# Patient Record
Sex: MALE | Employment: FULL TIME | URBAN - METROPOLITAN AREA
[De-identification: names, ages, dates, MRNs, and addresses within clinical notes are randomized per-mention and may not be internally consistent; named-entity substitution may affect disease eponyms.]

---

## 2021-06-21 ENCOUNTER — IMMUNIZATION (OUTPATIENT)
Dept: LAB | Facility: HOSPITAL | Age: 56
End: 2021-06-21
Attending: EMERGENCY MEDICINE
Payer: COMMERCIAL

## 2021-06-21 DIAGNOSIS — Z23 NEED FOR VACCINATION: Primary | ICD-10-CM

## 2021-06-21 PROCEDURE — 0001A SARSCOV2 VAC 30MCG/0.3ML IM: CPT

## 2021-07-12 ENCOUNTER — IMMUNIZATION (OUTPATIENT)
Dept: LAB | Facility: HOSPITAL | Age: 56
End: 2021-07-12
Attending: EMERGENCY MEDICINE
Payer: COMMERCIAL

## 2021-07-12 DIAGNOSIS — Z23 NEED FOR VACCINATION: Primary | ICD-10-CM

## 2021-07-12 PROCEDURE — 0002A SARSCOV2 VAC 30MCG/0.3ML IM: CPT

## 2024-07-05 ENCOUNTER — HOSPITAL ENCOUNTER (OUTPATIENT)
Age: 59
Discharge: HOME | End: 2024-07-05
Payer: COMMERCIAL

## 2024-07-05 VITALS
OXYGEN SATURATION: 96 % | DIASTOLIC BLOOD PRESSURE: 90 MMHG | RESPIRATION RATE: 20 BRPM | SYSTOLIC BLOOD PRESSURE: 139 MMHG | TEMPERATURE: 98 F | HEART RATE: 90 BPM

## 2024-07-05 DIAGNOSIS — H65.03 NON-RECURRENT ACUTE SEROUS OTITIS MEDIA OF BOTH EARS: Primary | ICD-10-CM

## 2024-07-05 DIAGNOSIS — H61.23 BILATERAL IMPACTED CERUMEN: ICD-10-CM

## 2024-07-05 RX ORDER — CETIRIZINE HYDROCHLORIDE 10 MG/1
10 TABLET ORAL DAILY
Qty: 30 TABLET | Refills: 0 | Status: SHIPPED | OUTPATIENT
Start: 2024-07-05 | End: 2024-08-04

## 2024-07-05 RX ORDER — AMOXICILLIN 875 MG/1
875 TABLET, COATED ORAL 2 TIMES DAILY
Qty: 20 TABLET | Refills: 0 | Status: SHIPPED | OUTPATIENT
Start: 2024-07-05 | End: 2024-07-15

## 2024-07-05 NOTE — ED PROVIDER NOTES
Patient Seen in: Immediate Care [facility]      History     Chief Complaint   Patient presents with    Cough/URI     Stated Complaint: Sinus Issue    Subjective:   59-year-old male presents to immediate care for bilateral ear discomfort.  Patient states he has had sinus cough and cold symptoms over the last 2-1/2 to 3 weeks.  He states over the last several days he has had bilateral ear pain and pressure.  Denies any fevers            Objective:   Past Medical History:    Hematuria              Past Surgical History:   Procedure Laterality Date    Arthroscopy of joint unlisted      left knee scope    Colonoscopy N/A 1/29/2016    Procedure: COLONOSCOPY;  Surgeon: Belem Tobias MD;  Location:  ENDOSCOPY                Social History     Socioeconomic History    Marital status: Single   Tobacco Use    Smoking status: Never   Substance and Sexual Activity    Alcohol use: Yes     Alcohol/week: 0.0 standard drinks of alcohol     Comment: 1-2 glasses weekly    Drug use: No              Review of Systems   Constitutional: Negative.    HENT:          Ear discomfort   Respiratory: Negative.     Cardiovascular: Negative.    Gastrointestinal: Negative.    Skin: Negative.    Neurological: Negative.        Positive for stated Chief Complaint: Cough/URI    Other systems are as noted in HPI.  Constitutional and vital signs reviewed.      All other systems reviewed and negative except as noted above.    Physical Exam     ED Triage Vitals [07/05/24 0941]   /90   Pulse 90   Resp 20   Temp 98.4 °F (36.9 °C)   Temp src Temporal   SpO2 96 %   O2 Device None (Room air)       Current Vitals:   Vital Signs  BP: 139/90  Pulse: 90  Resp: 20  Temp: 98.4 °F (36.9 °C)  Temp src: Temporal    Oxygen Therapy  SpO2: 96 %  O2 Device: None (Room air)            Physical Exam  Vitals and nursing note reviewed.   Constitutional:       General: He is not in acute distress.  HENT:      Head: Normocephalic.      Right Ear: There is  impacted cerumen.      Left Ear: There is impacted cerumen.   Cardiovascular:      Rate and Rhythm: Normal rate.   Pulmonary:      Effort: Pulmonary effort is normal.   Musculoskeletal:         General: Normal range of motion.   Skin:     General: Skin is warm and dry.   Neurological:      General: No focal deficit present.      Mental Status: He is alert and oriented to person, place, and time.               ED Course   Labs Reviewed - No data to display  Cerumen Impaction procedure note:  Removal of cerumen    Consent: Verbal consent obtained.  Risks and benefits: risks, benefits and alternatives were discussed includes hearing loss, tympanic membrane rupture, and/or inability to remove cerumen.  Patient verbalizes understanding and consents to procedures verbally.  Ear: Bilateral  Procedure details: MA used warm water irrigation and a curette to remove cerumen from external auditory canal.  Otoscope was used for visualization throughout the procedure.   Large amount of cerumen was removed  Post-procedure exam: Bilateral TMs are cloudy, bulging and erythematous.  Patient tolerated: Well  Complications: None                 MDM      Medical Decision Making  Pertinent Labs & Imaging studies reviewed. (See chart for details).  Patient coming in with bilateral ear discomfort, cold symptoms.   Differential diagnosis includes viral URI, cerumen impaction, otitis media  Will treat for cerumen impaction, otitis media.  Will discharge on amoxicillin. Patient is comfortable with this plan.     Overall Pt looks good. Non-toxic, well-hydrated and in no respiratory distress. Vital signs are reassuring. Exam is reassuring. I do not believe pt requires and additional diagnostic studies or intervention. I believe pt can be discharged home to continue evaluation as an outpatient. Follow-up provider given. Discharge instructions given and reviewed. Return for any problems. All understand and agreewith the plan.     Please note that  this report has been produced using speech recognition software and may contain errors related to that system including, but not limited to, errors in grammar, punctuation, and spelling, as well as words and phrases that possibly may have been recognized inappropriately. If there are any questions or concerns, contact the dictating provider for clarification.       The 21st Century Cures Act makes Medical Notes like these available to patients in the interest of transparency.  However, be advised this is a medical document.  It is intended as peer to peer communication.  It is written in medical language and may contain abbreviations or verbiage that are unfamiliar.  It may appear blunt or direct.  Medical documents are intended to carry relevant information, facts as evident, and the clinical opinion of the practitioner      Problems Addressed:  Bilateral impacted cerumen: acute illness or injury  Non-recurrent acute serous otitis media of both ears: acute illness or injury    Risk  OTC drugs.  Prescription drug management.        Disposition and Plan     Clinical Impression:  1. Non-recurrent acute serous otitis media of both ears    2. Bilateral impacted cerumen         Disposition:  Discharge  7/5/2024 10:31 am    Follow-up:  Christiano Bailey DO  [facility] [address] [place] [id number]  [phone]                Medications Prescribed:  Discharge Medication List as of 7/5/2024 10:34 AM        START taking these medications    Details   amoxicillin 875 MG Oral Tab Take 1 tablet (875 mg total) by mouth 2 (two) times daily for 10 days., Normal, Disp-20 tablet, R-0      cetirizine 10 MG Oral Tab Take 1 tablet (10 mg total) by mouth daily., Normal, Disp-30 tablet, R-0

## 2024-07-05 NOTE — DISCHARGE INSTRUCTIONS
Take antibiotics as prescribed  Go directly to emergency department for severe ear pain, sudden inability to hearing, swelling to your face behind ears or in your neck  You may also take ibuprofen or Tylenol for pain  Avoid swimming or submerging your head in water until you finish antibiotics  Avoid use of Q-tips

## 2024-07-11 ENCOUNTER — OFFICE VISIT (OUTPATIENT)
Dept: FAMILY MEDICINE CLINIC | Facility: CLINIC | Age: 59
End: 2024-07-11
Payer: COMMERCIAL

## 2024-07-11 ENCOUNTER — HOSPITAL ENCOUNTER (OUTPATIENT)
Dept: GENERAL RADIOLOGY | Age: 59
Discharge: HOME | End: 2024-07-11
Attending: FAMILY MEDICINE
Payer: COMMERCIAL

## 2024-07-11 ENCOUNTER — TELEPHONE (OUTPATIENT)
Dept: FAMILY MEDICINE CLINIC | Facility: CLINIC | Age: 59
End: 2024-07-11

## 2024-07-11 VITALS
SYSTOLIC BLOOD PRESSURE: 110 MMHG | RESPIRATION RATE: 16 BRPM | TEMPERATURE: 98 F | DIASTOLIC BLOOD PRESSURE: 80 MMHG | WEIGHT: 169 LBS | HEIGHT: 71 IN | HEART RATE: 77 BPM | BODY MASS INDEX: 23.66 KG/M2

## 2024-07-11 DIAGNOSIS — M54.12 CERVICAL RADICULOPATHY: Primary | ICD-10-CM

## 2024-07-11 DIAGNOSIS — J32.0 CHRONIC MAXILLARY SINUSITIS: ICD-10-CM

## 2024-07-11 DIAGNOSIS — H61.92 LESION OF LEFT EXTERNAL EAR: ICD-10-CM

## 2024-07-11 DIAGNOSIS — M54.12 CERVICAL RADICULOPATHY: ICD-10-CM

## 2024-07-11 PROCEDURE — 3074F SYST BP LT 130 MM HG: CPT | Performed by: FAMILY MEDICINE

## 2024-07-11 PROCEDURE — 99203 OFFICE O/P NEW LOW 30 MIN: CPT | Performed by: FAMILY MEDICINE

## 2024-07-11 PROCEDURE — 72050 X-RAY EXAM NECK SPINE 4/5VWS: CPT | Performed by: FAMILY MEDICINE

## 2024-07-11 PROCEDURE — 3008F BODY MASS INDEX DOCD: CPT | Performed by: FAMILY MEDICINE

## 2024-07-11 PROCEDURE — 3079F DIAST BP 80-89 MM HG: CPT | Performed by: FAMILY MEDICINE

## 2024-07-11 RX ORDER — CETIRIZINE HYDROCHLORIDE 10 MG/1
10 TABLET ORAL DAILY
Qty: 90 TABLET | Refills: 0 | Status: SHIPPED | OUTPATIENT
Start: 2024-07-11

## 2024-07-11 RX ORDER — FLUTICASONE PROPIONATE 50 MCG
2 SPRAY, SUSPENSION (ML) NASAL DAILY
Qty: 3 EACH | Refills: 0 | Status: SHIPPED | OUTPATIENT
Start: 2024-07-11

## 2024-07-11 NOTE — PROGRESS NOTES
[facility] Family [facility] Office Note  Chief Complaint:   Chief Complaint   Patient presents with    Wound Care     Wound on left ear     Arthritis     Patient states having pain and pressure on the lower neck area with tingling nerves going down his face arms and legs        HPI:   This is a 59 year old male coming in for:    Neck pain - The patient has complaints of bilateral neck pain. Pain started several years ago. Inciting event was unknown. Pt does have pain radiating into the arm. Pt reports tingling in the arm/hand. Pt denies weakness in the arm. Pain is worsened by bending his neck. Pain is lessened by nothing.  Sinusitis -patient believes he may have a history of seasonal allergies.  He was seen recently at walk-in clinic and prescribed amoxicillin for possible ear infection and given Zyrtec for allergies.  He has been taking both amoxicillin and Zyrtec but has not seen much improvement in his symptoms after 6 days.  States he has chronic sinus congestion near the maxillary region.  He also has occasional postnasal drip as well as rhinitis.  Lesion of left ear -patient noticed a lesion on the helix of the left external ear about 6 months ago.  States it has become more ulcerated and erosive.  He has not had it evaluated yet.  He has no prior history of skin cancer nor is it in the family.      Past Medical History:    Hematuria     Past Surgical History:   Procedure Laterality Date    Arthroscopy of joint unlisted      left knee scope    Colonoscopy N/A 1/29/2016    Procedure: COLONOSCOPY;  Surgeon: Belem Tobias MD;  Location:  ENDOSCOPY     Social History:  Social History     Socioeconomic History    Marital status: Single   Tobacco Use    Smoking status: Never   Substance and Sexual Activity    Alcohol use: Yes     Alcohol/week: 0.0 standard drinks of alcohol     Comment: 1-2 glasses weekly    Drug use: No     Family History:  No family history on file.  Allergies:  No Known  Allergies  Current Meds:  Current Outpatient Medications   Medication Sig Dispense Refill    fluticasone propionate 50 MCG/ACT Nasal Suspension 2 sprays by Each Nare route daily. 3 each 0    cetirizine 10 MG Oral Tab Take 1 tablet (10 mg total) by mouth daily. 90 tablet 0    amoxicillin 875 MG Oral Tab Take 1 tablet (875 mg total) by mouth 2 (two) times daily for 10 days. 20 tablet 0    cetirizine 10 MG Oral Tab Take 1 tablet (10 mg total) by mouth daily. 30 tablet 0    Multiple Vitamin (MULTI VITAMIN MENS) Oral Tab Take 1 tablet by mouth daily.        Counseling given: Not Answered       REVIEW OF SYSTEMS:   ROS:  CONSTITUTIONAL:  Denies any unusual weight gain/loss, fever, chills, weakness or fatigue.  HEENT:  Eyes:  Denies visual loss, blurred vision, double vision or yellow sclerae. Ears, Nose, Throat:  Denies hearing loss, sneezing, congestion, runny nose or sore throat.  CARDIOVASCULAR:  Denies chest pain, chest pressure or chest discomfort. No palpitations or edema.  Denies any dyspnea on exertion or at rest  RESPIRATORY:  Denies shortness of breath, cough  GASTROINTESTINAL:  Denies any abdominal pain, nausea, vomiting  NEUROLOGICAL:  Denies headache, dizziness, syncope, numbness or tingling in the extremities.  MUSCULOSKELETAL:  + neck pain  ALLERGIES:  + rhinitis    EXAM:   /80   Pulse 77   Temp 97.5 °F (36.4 °C) (Temporal)   Resp 16   Ht 5' 11\" (1.803 m)   Wt 169 lb (76.7 kg)   BMI 23.57 kg/m²  Estimated body mass index is 23.57 kg/m² as calculated from the following:    Height as of this encounter: 5' 11\" (1.803 m).    Weight as of this encounter: 169 lb (76.7 kg).   Vital signs reviewed.Appears stated age, well groomed.  Physical Exam:  GEN:  Patient is alert and oriented x3, no apparent distress  HEAD:  Normocephalic, atraumatic  HEENT:  Eyes: EOMI, PERRLA, no scleral icterus, conjunctivae clear bilaterally.  Ears: TM's clear and visible bilaterally, no excess cerumen or erythema, +  ulceration of left helix of external ear.  Throat:  No tonsillar erythema or exudate.  Mouth:  No oral lesions or ulcerations, no dental abnormalities noted.  LUNGS: clear to auscultation bilaterally, no rales/rhonchi/wheezing  HEART:  Regular rate and rhythm, no murmurs, rubs or gallops  ABDOMEN:  Soft, nondistended, nontender, bowel sounds normal in all 4 quadrants, no hepatosplenomegaly  EXTREMITIES:  Strength intact with 5/5 bilaterally upper and lower extremities, no edema noted  NEURO:  CN 2 - 12 grossly intact     ASSESSMENT AND PLAN:   1. Cervical radiculopathy  -  check C-spine xray  -  pending xray results, will refer to pain mgmt or neurosurgery  - XR CERVICAL SPINE (4VIEWS) (CPT=72050); Future    2. Chronic maxillary sinusitis  -  trial of flonase at bedtime and zyrtec once daily in am  -  refer to ENT if no improvement  - ENT Referral - In Network  - fluticasone propionate 50 MCG/ACT Nasal Suspension; 2 sprays by Each Nare route daily.  Dispense: 3 each; Refill: 0  - cetirizine 10 MG Oral Tab; Take 1 tablet (10 mg total) by mouth daily.  Dispense: 90 tablet; Refill: 0    3. Lesion of left external ear  -  concern for squamous cell carcinoma  -  refer to dermatology for evaluation  - Derm Referral - In Network      Meds & Refills for this Visit:  Requested Prescriptions     Signed Prescriptions Disp Refills    fluticasone propionate 50 MCG/ACT Nasal Suspension 3 each 0     Si sprays by Each Nare route daily.    cetirizine 10 MG Oral Tab 90 tablet 0     Sig: Take 1 tablet (10 mg total) by mouth daily.       Health Maintenance:  Health Maintenance Due   Topic Date Due    Annual Physical  Never done    DTaP,Tdap,and Td Vaccines (1 - Tdap) Never done    Zoster Vaccines (1 of 2) Never done    PSA  2017    COVID-19 Vaccine (3 - -24 season) 2023    Annual Depression Screening  Never done       Patient/Caregiver Education: Patient/Caregiver Education: There are no barriers to learning.  Medical education done.   Outcome: Patient verbalizes understanding. Patient is notified to call with any questions, complications, allergies, or worsening or changing symptoms.  Patient is to call with any side effects or complications from the treatments as a result of today.     Problem List:  There is no problem list on file for this patient.      CHELSEA MACIAS, DO    Please note that portions of this note may have been completed with a voice recognition program. Efforts were made to edit the dictations but occasionally words are mis-transcribed.

## 2024-07-11 NOTE — TELEPHONE ENCOUNTER
Per Dr. Bailey he would like patient to see Dr. Barr to evaluate patient's left ear lesion this week.  Spoke with Tim and scheduled patient tomorrow at 1300 PM with Dr. Barr for evaluation.    Called patient and informed of the appointment made tomorrow with Dr. Barr and he voiced understanding and agreed with the appointment.    To send to patient via [employer] with Dr. Barr's info:    Dr. Letty Barr Dermatology  [facility] [address] [address] [phone]

## 2024-07-22 ENCOUNTER — OFFICE VISIT (OUTPATIENT)
Dept: FAMILY MEDICINE CLINIC | Facility: CLINIC | Age: 59
End: 2024-07-22
Payer: COMMERCIAL

## 2024-07-22 VITALS
DIASTOLIC BLOOD PRESSURE: 88 MMHG | WEIGHT: 168 LBS | HEIGHT: 71.5 IN | BODY MASS INDEX: 23 KG/M2 | TEMPERATURE: 98 F | HEART RATE: 67 BPM | SYSTOLIC BLOOD PRESSURE: 128 MMHG | RESPIRATION RATE: 16 BRPM | OXYGEN SATURATION: 97 %

## 2024-07-22 DIAGNOSIS — H93.8X3 EAR PRESSURE, BILATERAL: Primary | ICD-10-CM

## 2024-07-22 DIAGNOSIS — H92.03 OTALGIA OF BOTH EARS: ICD-10-CM

## 2024-07-22 PROCEDURE — 3008F BODY MASS INDEX DOCD: CPT | Performed by: NURSE PRACTITIONER

## 2024-07-22 PROCEDURE — 3079F DIAST BP 80-89 MM HG: CPT | Performed by: NURSE PRACTITIONER

## 2024-07-22 PROCEDURE — 3074F SYST BP LT 130 MM HG: CPT | Performed by: NURSE PRACTITIONER

## 2024-07-22 PROCEDURE — 99213 OFFICE O/P EST LOW 20 MIN: CPT | Performed by: NURSE PRACTITIONER

## 2024-07-22 NOTE — PROGRESS NOTES
CHIEF COMPLAINT:     Chief Complaint   Patient presents with    Ear Problem     Bilat ear clogged, drainage, pain scale 4/10 comes and goes   Denies fever   ENT appt next Tues  Recent completion of Amox x ear inflammation  Using Flonase        HPI:   Noble Arizmendi is a 59 year old male who presents to clinic today with complaints of bilateral ear pain. Has had for 2  weeks. Pain is described as pressure.  Patient denies history of ear infections. Home treatment includes Flonase and Zyrtec.     Associated symptoms:  Patient denies decreased hearing. Patient denies hearing loss. Patient denies drainage. Patient denies use of cotton tipped ear swabs to clean the ears. Patient reports following URI symptoms:  none    Current Outpatient Medications   Medication Sig Dispense Refill    fluticasone propionate 50 MCG/ACT Nasal Suspension 2 sprays by Each Nare route daily. 3 each 0    cetirizine 10 MG Oral Tab Take 1 tablet (10 mg total) by mouth daily. 90 tablet 0    cetirizine 10 MG Oral Tab Take 1 tablet (10 mg total) by mouth daily. 30 tablet 0    Multiple Vitamin (MULTI VITAMIN MENS) Oral Tab Take 1 tablet by mouth daily.        Past Medical History:    Hematuria      Social History:  Social History     Socioeconomic History    Marital status: Single   Tobacco Use    Smoking status: Never    Smokeless tobacco: Never   Substance and Sexual Activity    Alcohol use: Yes     Alcohol/week: 0.0 standard drinks of alcohol     Comment: 1-2 glasses weekly    Drug use: No        REVIEW OF SYSTEMS:   GENERAL: See HPI  SKIN: no unusual skin lesions or rashes  HEENT: See HPI  LUNGS: No shortness of breath, or wheezing.  CARDIOVASCULAR: No chest pain, palpitations  GI: No N/V/C/D.  NEURO: denies headaches or dizziness    EXAM:   /88   Pulse 67   Temp 97.9 °F (36.6 °C)   Resp 16   Ht 5' 11.5\" (1.816 m)   Wt 168 lb (76.2 kg)   SpO2 97%   BMI 23.10 kg/m²   GENERAL: well developed, well nourished,in no apparent  distress  SKIN: no rashes,no suspicious lesions  HEAD: atraumatic, normocephalic  EYES: conjunctiva clear, EOM intact  EARS: Bilateral tragus non tender with manipulation.  External auditory canals: left with dressing affixed to pinna, right WNL. Bilateral TMs: non-erythematous, no bulging, no retraction,noeffusion, bony landmarks clearly visualized.    NOSE: nostrils patent, clear nasal mucus, nasal mucosa pink and non-inflamed  THROAT: oral mucosa pink, moist. Posterior pharynx is not erythematous or injected. No exudates.  NECK: supple, non-tender  LUNGS: clear to auscultation bilaterally, no wheezes or rhonchi. Breathing is non labored.  CARDIO: RRR without murmur  EXTREMITIES: no cyanosis, clubbing or edema  LYMPH: No lymphadenopathy.      ASSESSMENT AND PLAN:   Noble Arizmendi is a 59 year old male who presents with ear problems symptoms are consistent with    ASSESSMENT:  Encounter Diagnoses   Name Primary?    Ear pressure, bilateral Yes    Otalgia of both ears        PLAN: Meds as listed below.  Comfort measures as described in Patient Instructions    Meds & Refills for this Visit:  Requested Prescriptions      No prescriptions requested or ordered in this encounter       Acetaminophen or NSAID prn pain.      Keep ENT appointment in few day.   Continue with nasal spray.  Patient voiced understand and is in agreement with treatment plan.

## 2024-07-25 ENCOUNTER — OFFICE VISIT (OUTPATIENT)
Dept: PHYSICAL MEDICINE AND REHAB | Facility: CLINIC | Age: 59
End: 2024-07-25
Payer: COMMERCIAL

## 2024-07-25 ENCOUNTER — LAB ENCOUNTER (OUTPATIENT)
Dept: LAB | Age: 59
End: 2024-07-25
Attending: PHYSICAL MEDICINE & REHABILITATION
Payer: COMMERCIAL

## 2024-07-25 DIAGNOSIS — R20.2 PARESTHESIA OF ARM: Primary | ICD-10-CM

## 2024-07-25 LAB — TSI SER-ACNC: 0.8 MIU/ML (ref 0.55–4.78)

## 2024-07-25 PROCEDURE — 99204 OFFICE O/P NEW MOD 45 MIN: CPT | Performed by: PHYSICAL MEDICINE & REHABILITATION

## 2024-07-25 PROCEDURE — 84443 ASSAY THYROID STIM HORMONE: CPT | Performed by: PHYSICAL MEDICINE & REHABILITATION

## 2024-07-25 RX ORDER — GABAPENTIN 300 MG/1
300 CAPSULE ORAL NIGHTLY
Qty: 30 CAPSULE | Refills: 0 | Status: SHIPPED | OUTPATIENT
Start: 2024-07-25 | End: 2024-08-24

## 2024-07-25 NOTE — PROGRESS NOTES
NEW PATIENT VISIT    CHIEF COMPLAINT  Neck Pain    HISTORY OF PRESENTING ILLNESS  Noble Arizmendi is a 59 year old male who presents for evaluation of neck pain.  Patient is referred to my office through his primary care physician Dr. Womack complaining of bilateral upper back and neck burning pain.  He endorses numbness and tingling down the bilateral arms.  Denies weakness currently.  Rates his level pain 2/10.  He has an x-ray on file which is reviewed in full below.  Has not dissipated in physical therapy for this reason.    Symptoms are acute side, starting around 4-1/2 to 5 weeks ago without inciting event or injury.  No intervention in the past.  Not currently using any medication.    States that movement, especially with the neck, he will notice worsening pain and pressure in the neck, with some heat and pressure. He notes tingling in the bilateral arms, and right greater than left leg.     Also complains of left-sided low back and posterior left leg radiating pain.    He also endorses some heat/sweating sensation intermittently with certain positioning of the axial neck especially when looking down.    No red flags.    PAST MEDICAL HISTORY  Past Medical History:    Hematuria       PAST SURGICAL HISTORY  Past Surgical History:   Procedure Laterality Date    Arthroscopy of joint unlisted      left knee scope    Colonoscopy N/A 1/29/2016    Procedure: COLONOSCOPY;  Surgeon: Belem Tobias MD;  Location:  ENDOSCOPY       MEDICATIONS  Current Outpatient Medications on File Prior to Visit   Medication Sig Dispense Refill    fluticasone propionate 50 MCG/ACT Nasal Suspension 2 sprays by Each Nare route daily. 3 each 0    cetirizine 10 MG Oral Tab Take 1 tablet (10 mg total) by mouth daily. 90 tablet 0    cetirizine 10 MG Oral Tab Take 1 tablet (10 mg total) by mouth daily. 30 tablet 0    Multiple Vitamin (MULTI VITAMIN MENS) Oral Tab Take 1 tablet by mouth daily.       No current facility-administered  medications on file prior to visit.       ALLERGIES  No Known Allergies    SOCIAL HISTORY   reports that he has never smoked. He has never used smokeless tobacco. He reports current alcohol use. He reports that he does not use drugs.    FAMILY HISTORY  No family history on file.    REVIEW OF SYSTEMS  Complete review of systems was performed and was negative except for those items stated in the History of Presenting Illness and Past Medical/Surgical History.    PHYSICAL EXAMINATION  GENERAL:  In no acute distress. Well-developed and well nourished.   SKIN: No rashes or open wounds involving the upper extremities and neck.  NEUROLOGIC:   Strength: 5/5 throughout bilateral upper and lower extremities in all major muscle groups.   Sensation: intact light touch sensation throughout bilateral upper and lower extremities.   Reflexes: He has brisk reflexes, not technically hyperreflexic they are symmetric however Myrna's is negative bilaterally   gait: able to heel walk, toe walk, and perform tandem gait.   MUSCULOSKELETAL:  Inspection: shoulders in protracted positions, lower cervical flexion, upper cervical extension posture. No scapular winging or muscular atrophy.  Palpation: tenderness was present over cervical paraspinals muscles mildly. .  ROM:   Cervical: Full in flexion extension sidebending and rotation.    Shoulder: full in all planes and pain free.  Special Tests:   Spurling's: Negative   Lhermitte’s: Negative   Hawkin’s / Neer’s: Negative      REVIEW OF PRIOR X-RAYS/STUDIES  Independently reviewed the plain film radiographs of the cervical spine dated 7/11/2024 which reveals multilevel degenerative changes with retrolisthesis of C3 on C4.  There is some osseous neuroforaminal narrowing moderate on the right side at C5-6 and moderate to severe on the right side at C6-7.    IMPRESSION/DIAGNOSIS  Encounter Diagnosis   Name Primary?    Paresthesia of arm Yes     Axial neck pain  Low back pain  Left lower  extremity radicular pain, chronic  TREATMENT/PLAN  Suspicion here for some sort of nerve involvement with persistent paresthesias in the bilateral hands and arms in the setting of neck positioning may be cervical radiculopathy versus radiculitis.  No evidence of myelopathy on my examination today.  No hyperreflexia.  X-ray with some bony foraminal narrowing however this is only on the right side and does not explain his left-sided symptoms.    We may need an MRI of the cervical spine at some point.  For now we will obtain EMG of the bilateral upper extremities and get started in physical therapy.    Additionally we will start the patient on nighttime dose of gabapentin.    He will follow-up with the above EMG.    Education was provided regarding the above impression/diagnosis and treatment options/plan were discussed.  All questions were answered during today's visit.  Patient will contact clinic if any other questions or concerns.            Fredy Byrnes DO  Interventional Spine and Sports Medicine Specialist   Physical Medicine and Rehabilitation  [address] [address]    [address] [place] [address]

## 2024-07-30 ENCOUNTER — HOSPITAL ENCOUNTER (EMERGENCY)
Facility: HOSPITAL | Age: 59
Discharge: HOME | End: 2024-07-30
Attending: EMERGENCY MEDICINE
Payer: COMMERCIAL

## 2024-07-30 ENCOUNTER — TELEPHONE (OUTPATIENT)
Dept: FAMILY MEDICINE CLINIC | Facility: CLINIC | Age: 59
End: 2024-07-30

## 2024-07-30 VITALS
DIASTOLIC BLOOD PRESSURE: 88 MMHG | OXYGEN SATURATION: 97 % | HEIGHT: 71 IN | RESPIRATION RATE: 16 BRPM | SYSTOLIC BLOOD PRESSURE: 140 MMHG | WEIGHT: 169 LBS | TEMPERATURE: 98 F | HEART RATE: 78 BPM | BODY MASS INDEX: 23.66 KG/M2

## 2024-07-30 DIAGNOSIS — R20.0 RIGHT ARM NUMBNESS: Primary | ICD-10-CM

## 2024-07-30 LAB
ALBUMIN SERPL-MCNC: 4.6 G/DL (ref 3.2–4.8)
ALBUMIN/GLOB SERPL: 2 {RATIO} (ref 1–2)
ALP LIVER SERPL-CCNC: 71 U/L
ALT SERPL-CCNC: 18 U/L
ANION GAP SERPL CALC-SCNC: 2 MMOL/L (ref 0–18)
AST SERPL-CCNC: 17 U/L (ref ?–34)
BASOPHILS # BLD AUTO: 0.05 X10(3) UL (ref 0–0.2)
BASOPHILS NFR BLD AUTO: 0.7 %
BILIRUB SERPL-MCNC: 0.5 MG/DL (ref 0.3–1.2)
BUN BLD-MCNC: 20 MG/DL (ref 9–23)
CALCIUM BLD-MCNC: 9.3 MG/DL (ref 8.7–10.4)
CHLORIDE SERPL-SCNC: 105 MMOL/L (ref 98–112)
CO2 SERPL-SCNC: 29 MMOL/L (ref 21–32)
CREAT BLD-MCNC: 0.9 MG/DL
EGFRCR SERPLBLD CKD-EPI 2021: 98 ML/MIN/1.73M2 (ref 60–?)
EOSINOPHIL # BLD AUTO: 0.17 X10(3) UL (ref 0–0.7)
EOSINOPHIL NFR BLD AUTO: 2.3 %
ERYTHROCYTE [DISTWIDTH] IN BLOOD BY AUTOMATED COUNT: 12.1 %
GLOBULIN PLAS-MCNC: 2.3 G/DL (ref 2–3.5)
GLUCOSE BLD-MCNC: 95 MG/DL (ref 70–99)
HCT VFR BLD AUTO: 40.8 %
HGB BLD-MCNC: 14.2 G/DL
IMM GRANULOCYTES # BLD AUTO: 0.02 X10(3) UL (ref 0–1)
IMM GRANULOCYTES NFR BLD: 0.3 %
LYMPHOCYTES # BLD AUTO: 2.74 X10(3) UL (ref 1–4)
LYMPHOCYTES NFR BLD AUTO: 36.5 %
MAGNESIUM SERPL-MCNC: 2 MG/DL (ref 1.6–2.6)
MCH RBC QN AUTO: 31.3 PG (ref 26–34)
MCHC RBC AUTO-ENTMCNC: 34.8 G/DL (ref 31–37)
MCV RBC AUTO: 89.9 FL
MONOCYTES # BLD AUTO: 0.76 X10(3) UL (ref 0.1–1)
MONOCYTES NFR BLD AUTO: 10.1 %
NEUTROPHILS # BLD AUTO: 3.76 X10 (3) UL (ref 1.5–7.7)
NEUTROPHILS # BLD AUTO: 3.76 X10(3) UL (ref 1.5–7.7)
NEUTROPHILS NFR BLD AUTO: 50.1 %
OSMOLALITY SERPL CALC.SUM OF ELEC: 284 MOSM/KG (ref 275–295)
PLATELET # BLD AUTO: 360 10(3)UL (ref 150–450)
POTASSIUM SERPL-SCNC: 4.2 MMOL/L (ref 3.5–5.1)
PROT SERPL-MCNC: 6.9 G/DL (ref 5.7–8.2)
RBC # BLD AUTO: 4.54 X10(6)UL
SODIUM SERPL-SCNC: 136 MMOL/L (ref 136–145)
WBC # BLD AUTO: 7.5 X10(3) UL (ref 4–11)

## 2024-07-30 PROCEDURE — 99283 EMERGENCY DEPT VISIT LOW MDM: CPT

## 2024-07-30 PROCEDURE — 85025 COMPLETE CBC W/AUTO DIFF WBC: CPT | Performed by: EMERGENCY MEDICINE

## 2024-07-30 PROCEDURE — 99284 EMERGENCY DEPT VISIT MOD MDM: CPT

## 2024-07-30 PROCEDURE — 80053 COMPREHEN METABOLIC PANEL: CPT | Performed by: EMERGENCY MEDICINE

## 2024-07-30 PROCEDURE — 83735 ASSAY OF MAGNESIUM: CPT | Performed by: EMERGENCY MEDICINE

## 2024-07-30 PROCEDURE — 36415 COLL VENOUS BLD VENIPUNCTURE: CPT

## 2024-07-30 RX ORDER — ALPRAZOLAM 0.5 MG/1
0.5 TABLET ORAL 3 TIMES DAILY PRN
Qty: 20 TABLET | Refills: 0 | Status: SHIPPED | OUTPATIENT
Start: 2024-07-30 | End: 2024-08-06

## 2024-07-30 NOTE — TELEPHONE ENCOUNTER
I called and spoke to the patient. He has not been sleeping will due to some anxiety issues. I asked him if he has any thoughts of harming himself or others and he stated \" no, I have some situational anxiety that does not allow me to sleep well.\" He requested a medication to help him sleep. I advised patient he really needs an appointment to discuss this with Dr. Bailey. Appointment made for tomorrow at 5:15 pm. Pt. Agreed to plan and verbalized understanding

## 2024-07-30 NOTE — TELEPHONE ENCOUNTER
Patient states he has been having some anxiety and feeling depressed. Patient has been having trouble sleeping due to that. Patient would like to know if any medication can be sent to pharmacy to help him sleep. Please advise.

## 2024-07-31 ENCOUNTER — MED REC SCAN ONLY (OUTPATIENT)
Dept: PHYSICAL MEDICINE AND REHAB | Facility: CLINIC | Age: 59
End: 2024-07-31

## 2024-07-31 NOTE — DISCHARGE INSTRUCTIONS
Follow-up with your primary care physician tomorrow as previously arranged.    You may use Xanax (alprazolam) for sleep.    Discuss additional testing with your primary care physician including but not limited to an MRI of the cervical spine, EMG/NCV testing.

## 2024-07-31 NOTE — ED INITIAL ASSESSMENT (HPI)
Patient complains of back pain and \"nerve tingling\" in his head, shoulders, and arms for \"a few weeks.\" Patient has been seen by multiple doctors for this issue and had PT today. Patient ambulatory. Negative stroke scale. Patient has chronic back pain and spinal issues.

## 2024-07-31 NOTE — ED PROVIDER NOTES
Patient Seen in: [facility] Emergency Department      History     Chief Complaint   Patient presents with    Other    Back Pain     Stated Complaint: body tingling through out the body, back pain    Subjective:   HPI    59-year-old male presents to the emergency department for right arm numbness.  He states that this has been going on intermittently for several weeks or longer but it has been noticeable with more intensity over the past few days.  He states that his arm feels heavy, that the numbness starts in the top of his shoulder blade and it extends all the way down his arm associated with all of his fingers.  He denies any arm weakness although he states it feels like it is weak.  He is not dropping things.  He has been seen by a spine specialist who referred him to physical therapy today and he has an appointment for an EMG but not for several weeks.  He denies any radicular symptoms.  He has had some tingling in his left arm and also in his left leg.  He states x-rays of his cervical spine were unremarkable.    Objective:   Past Medical History:    Back pain    Hematuria    Nerve damage              Past Surgical History:   Procedure Laterality Date    Arthroscopy of joint unlisted      left knee scope    Colonoscopy N/A 1/29/2016    Procedure: COLONOSCOPY;  Surgeon: Belem Tobias MD;  Location:  ENDOSCOPY                Social History     Socioeconomic History    Marital status: Single   Tobacco Use    Smoking status: Never    Smokeless tobacco: Never   Substance and Sexual Activity    Alcohol use: Yes     Alcohol/week: 0.0 standard drinks of alcohol     Comment: 1-2 glasses weekly    Drug use: No              Review of Systems    Positive for stated Chief Complaint: Other and Back Pain    Other systems are as noted in HPI.  Constitutional and vital signs reviewed.      All other systems reviewed and negative except as noted above.    Physical Exam     ED Triage Vitals [07/30/24 1949]   BP  144/90   Pulse 79   Resp 18   Temp 98.1 °F (36.7 °C)   Temp src Oral   SpO2 98 %   O2 Device None (Room air)       Current Vitals:   Vital Signs  BP: 140/88  Pulse: 78  Resp: 16  Temp: 98.1 °F (36.7 °C)  Temp src: Oral  MAP (mmHg): (!) 108    Oxygen Therapy  SpO2: 97 %  O2 Device: None (Room air)            Physical Exam    General appearance: This is a middle-aged male sitting on a gurney.  Vital signs were reviewed per nurses notes.  HEENT: Normocephalic atraumatic.  Anicteric sclera.  Oral mucosa is moist.  Oropharynx is normal.  Neck: No adenopathy or thyromegaly.  No posterior midline tenderness crepitations or step-offs.  Lungs are clear to auscultation.  Heart exam: Normal S1-S2 without extra sounds or murmurs.  Regular rate and rhythm.  Chest and abdomen are nontender.  Extremities are atraumatic.  Skin is dry without rashes or lesions.  Neuroexam: Alert and oriented x 4.  Speech is fluent.  Motor strength is 5/5 and symmetrical in all major motor groups of the upper extremities.  Able to discriminate pain and denies disparity between upper extremities.    ED Course     Labs Reviewed   COMP METABOLIC PANEL (14) - Normal   MAGNESIUM - Normal   CBC WITH DIFFERENTIAL WITH PLATELET    Narrative:     The following orders were created for panel order CBC With Differential With Platelet.  Procedure                               Abnormality         Status                     ---------                               -----------         ------                     CBC W/ DIFFERENTIAL[[id number]]                              Final result                 Please view results for these tests on the individual orders.   CBC W/ DIFFERENTIAL             Intravenous access was obtained.  Laboratory studies were drawn.    Test results and treatment plan were discussed with the patient prior to discharge.         MDM      #1.  Right arm numbness.  The patient does not have specific radicular symptoms or weakness or objective  numbness that would necessitate any emergent imaging at this time, specifically an MRI of the cervical spine.  He does have an EMG scheduled but not for 6 weeks.  He does have an appointment with his primary care physician tomorrow.  Recommended discussing workup options with his PCP.                                   Medical Decision Making      Disposition and Plan     Clinical Impression:  1. Right arm numbness         Disposition:  Discharge  7/30/2024 11:52 pm    Follow-up:  Christiano Bailey,   [address] [place] [id number]  [phone]    Go in 1 day(s)            Medications Prescribed:  Discharge Medication List as of 7/30/2024 11:55 PM        START taking these medications    Details   ALPRAZolam 0.5 MG Oral Tab Take 1 tablet (0.5 mg total) by mouth 3 (three) times daily as needed for Anxiety., Normal, Disp-20 tablet, R-0

## 2024-08-01 ENCOUNTER — TELEPHONE (OUTPATIENT)
Dept: PHYSICAL MEDICINE AND REHAB | Facility: CLINIC | Age: 59
End: 2024-08-01

## 2024-08-01 ENCOUNTER — MED REC SCAN ONLY (OUTPATIENT)
Dept: FAMILY MEDICINE CLINIC | Facility: CLINIC | Age: 59
End: 2024-08-01

## 2024-08-01 DIAGNOSIS — M54.12 CERVICAL RADICULOPATHY: Primary | ICD-10-CM

## 2024-08-01 NOTE — TELEPHONE ENCOUNTER
S/w patient regarding new recommendations. Patient was given number for central scheduling and insight imaging.     Patient will callback for any further needs or questions. Nothing additional needed at this time.

## 2024-08-01 NOTE — TELEPHONE ENCOUNTER
Pt would like to speak with RN regarding recent changes in his condition. Please advise, thank you.

## 2024-08-01 NOTE — TELEPHONE ENCOUNTER
New symptoms: RIGHT arm numbness/weakness - constant; intermittent numbness/weakness to LEFT arm.   Location of symptoms: RIGHT ARM - forearm, hand; Intermittent to LEFT ARM  Intermittent burning/radiating pain to upper back/lower neck.  Date symptoms Began: 07/30/24 - AM.   Current treatment: Temazepam - started last night, w/ somewhat relief with sleep  - Was prescribed sertraline, but has not yet filled it d/t stocking issues and was instructed by Dr. Bailey to hold off until side effects of Temazepam were known.         - Has not taken any Alprazolam (Given by ER) - was instructed by Dr. Bailey to not take this medication.  - Took Gabapentin x1 on 07/25/2024 - has not taken since due to side effects: grogginess.  Seen in ER/Urgent care: Yes: 07/30/2024  - Negative stroke scale.        If the following symptoms are identified route high priority and verbally notify provider: new/acute weakness          Description of Pain:   numbness and tingling; burning  Patient continues to endorse intermittent night sweats, and sweating that occurs with looking up or down.  Aggravating Factors:  denies any aggravating factors.       LOV: 7/25/2024 Fredy Byrnes DO    NOV: 8/7/2024 Fredy Byrnes DO     Summary of patient request: What to do in the meantime until he sees Dr. Bynres next week.

## 2024-08-02 ENCOUNTER — TELEPHONE (OUTPATIENT)
Dept: FAMILY MEDICINE CLINIC | Facility: CLINIC | Age: 59
End: 2024-08-02

## 2024-08-02 NOTE — TELEPHONE ENCOUNTER
I called the patient and told him he can start the Sertraline today as long as he has had no side effects on the Temazepam. He said he has been fine. Can he take the Sertraline at night? And if so is can he take the Temazepam at night as well?

## 2024-08-02 NOTE — TELEPHONE ENCOUNTER
Patient instructed to take the sertraline in the morning. Pt. Agreed to plan and verbalized understanding

## 2024-08-02 NOTE — TELEPHONE ENCOUNTER
Patient was given sertraline 50 MG Oral Tab and   temazepam 7.5 MG Oral Cap    Patient was told to wait a couple of days to start the   sertraline 50 MG Oral Tab. Patient has been taking temazepam 7.5 MG Oral Cap  for 2 night now . The patient would like to know if he could start taking the   sertraline 50 MG Oral Tab  or should the patient wait the full 3 days.    Please advise    Thank you

## 2024-08-05 ENCOUNTER — APPOINTMENT (OUTPATIENT)
Dept: MRI IMAGING | Age: 59
End: 2024-08-05
Attending: PHYSICAL MEDICINE & REHABILITATION

## 2024-08-14 ENCOUNTER — APPOINTMENT (OUTPATIENT)
Dept: GENERAL RADIOLOGY | Facility: HOSPITAL | Age: 59
DRG: 958 | End: 2024-08-14
Attending: NURSE PRACTITIONER
Payer: COMMERCIAL

## 2024-08-14 ENCOUNTER — ANESTHESIA (OUTPATIENT)
Dept: SURGERY | Facility: HOSPITAL | Age: 59
End: 2024-08-14
Payer: COMMERCIAL

## 2024-08-14 ENCOUNTER — ANESTHESIA EVENT (OUTPATIENT)
Dept: SURGERY | Facility: HOSPITAL | Age: 59
End: 2024-08-14
Payer: COMMERCIAL

## 2024-08-14 ENCOUNTER — APPOINTMENT (OUTPATIENT)
Dept: GENERAL RADIOLOGY | Facility: HOSPITAL | Age: 59
End: 2024-08-14
Attending: NURSE PRACTITIONER
Payer: COMMERCIAL

## 2024-08-14 ENCOUNTER — APPOINTMENT (OUTPATIENT)
Dept: GENERAL RADIOLOGY | Facility: HOSPITAL | Age: 59
End: 2024-08-14
Attending: EMERGENCY MEDICINE
Payer: COMMERCIAL

## 2024-08-14 ENCOUNTER — HOSPITAL ENCOUNTER (INPATIENT)
Facility: HOSPITAL | Age: 59
LOS: 7 days | Discharge: NURSING, REHAB OR LONG-TERM CARE | DRG: 958 | End: 2024-08-21
Attending: EMERGENCY MEDICINE | Admitting: SURGERY
Payer: COMMERCIAL

## 2024-08-14 ENCOUNTER — APPOINTMENT (OUTPATIENT)
Dept: GENERAL RADIOLOGY | Facility: HOSPITAL | Age: 59
DRG: 958 | End: 2024-08-14
Attending: EMERGENCY MEDICINE
Payer: COMMERCIAL

## 2024-08-14 ENCOUNTER — HOSPITAL ENCOUNTER (INPATIENT)
Facility: HOSPITAL | Age: 59
LOS: 7 days | Discharge: HOME | End: 2024-08-21
Attending: EMERGENCY MEDICINE | Admitting: SURGERY
Payer: COMMERCIAL

## 2024-08-14 DIAGNOSIS — S31.109A PENETRATING ABDOMINAL TRAUMA, INITIAL ENCOUNTER: Primary | ICD-10-CM

## 2024-08-14 DIAGNOSIS — S41.112A ARM LACERATION, LEFT, INITIAL ENCOUNTER: ICD-10-CM

## 2024-08-14 DIAGNOSIS — G47.00 INSOMNIA, UNSPECIFIED TYPE: ICD-10-CM

## 2024-08-14 DIAGNOSIS — F33.2 MAJOR DEPRESSIVE DISORDER, RECURRENT SEVERE WITHOUT PSYCHOTIC FEATURES (HCC): ICD-10-CM

## 2024-08-14 DIAGNOSIS — R45.851 SUICIDAL IDEATION: ICD-10-CM

## 2024-08-14 DIAGNOSIS — F41.1 GAD (GENERALIZED ANXIETY DISORDER): ICD-10-CM

## 2024-08-14 DIAGNOSIS — T81.31XA: ICD-10-CM

## 2024-08-14 LAB
ALBUMIN SERPL-MCNC: 3.6 G/DL (ref 3.2–4.8)
ALBUMIN SERPL-MCNC: 4.6 G/DL (ref 3.2–4.8)
ALBUMIN/GLOB SERPL: 1.8 {RATIO} (ref 1–2)
ALBUMIN/GLOB SERPL: 1.9 {RATIO} (ref 1–2)
ALP LIVER SERPL-CCNC: 45 U/L
ALP LIVER SERPL-CCNC: 61 U/L
ALT SERPL-CCNC: 36 U/L
ALT SERPL-CCNC: 36 U/L
AMPHET UR QL SCN: NEGATIVE
ANION GAP SERPL CALC-SCNC: 11 MMOL/L (ref 0–18)
ANION GAP SERPL CALC-SCNC: 8 MMOL/L (ref 0–18)
ANTIBODY SCREEN: NEGATIVE
APAP SERPL-MCNC: 4.4 UG/ML (ref 10–20)
APTT PPP: 30.4 SECONDS (ref 23–36)
ARTERIAL PATENCY WRIST A: POSITIVE
AST SERPL-CCNC: 30 U/L (ref ?–34)
AST SERPL-CCNC: 38 U/L (ref ?–34)
BASE EXCESS BLDA CALC-SCNC: -6.2 MMOL/L (ref ?–2)
BASOPHILS # BLD AUTO: 0.03 X10(3) UL (ref 0–0.2)
BASOPHILS NFR BLD AUTO: 0.1 %
BILIRUB SERPL-MCNC: 1.5 MG/DL (ref 0.3–1.2)
BILIRUB SERPL-MCNC: 2.1 MG/DL (ref 0.3–1.2)
BODY TEMPERATURE: 98.6 F
BUN BLD-MCNC: 26 MG/DL (ref 9–23)
BUN BLD-MCNC: 35 MG/DL (ref 9–23)
CA-I BLD-SCNC: 1.14 MMOL/L (ref 0.95–1.32)
CALCIUM BLD-MCNC: 10.3 MG/DL (ref 8.7–10.4)
CALCIUM BLD-MCNC: 8.4 MG/DL (ref 8.7–10.4)
CANNABINOIDS UR QL SCN: NEGATIVE
CHLORIDE SERPL-SCNC: 107 MMOL/L (ref 98–112)
CHLORIDE SERPL-SCNC: 98 MMOL/L (ref 98–112)
CK SERPL-CCNC: 299 U/L
CO2 SERPL-SCNC: 20 MMOL/L (ref 21–32)
CO2 SERPL-SCNC: 23 MMOL/L (ref 21–32)
COCAINE UR QL: NEGATIVE
COHGB MFR BLD: 2 % SAT (ref 0–3)
CREAT BLD-MCNC: 1.72 MG/DL
CREAT BLD-MCNC: 3.39 MG/DL
CREAT UR-SCNC: 142.4 MG/DL
EGFRCR SERPLBLD CKD-EPI 2021: 20 ML/MIN/1.73M2 (ref 60–?)
EGFRCR SERPLBLD CKD-EPI 2021: 45 ML/MIN/1.73M2 (ref 60–?)
EOSINOPHIL # BLD AUTO: 0 X10(3) UL (ref 0–0.7)
EOSINOPHIL NFR BLD AUTO: 0 %
ERYTHROCYTE [DISTWIDTH] IN BLOOD BY AUTOMATED COUNT: 12.2 %
ERYTHROCYTE [DISTWIDTH] IN BLOOD BY AUTOMATED COUNT: 13.1 %
ETHANOL SERPL-MCNC: <3 MG/DL (ref ?–3)
FIO2: 45 %
GLOBULIN PLAS-MCNC: 2 G/DL (ref 2–3.5)
GLOBULIN PLAS-MCNC: 2.4 G/DL (ref 2–3.5)
GLUCOSE BLD-MCNC: 159 MG/DL (ref 70–99)
GLUCOSE BLD-MCNC: 164 MG/DL (ref 70–99)
GLUCOSE BLD-MCNC: 178 MG/DL (ref 70–99)
HCO3 BLDA-SCNC: 20.1 MEQ/L (ref 21–27)
HCT VFR BLD AUTO: 40 %
HCT VFR BLD AUTO: 40.2 %
HGB BLD-MCNC: 14.3 G/DL
HGB BLD-MCNC: 14.7 G/DL
HGB BLD-MCNC: 15 G/DL
IMM GRANULOCYTES # BLD AUTO: 0.19 X10(3) UL (ref 0–1)
IMM GRANULOCYTES NFR BLD: 0.8 %
INR BLD: 1.15 (ref 0.8–1.2)
LACTATE BLD-SCNC: 3.5 MMOL/L (ref 0.5–2)
LYMPHOCYTES # BLD AUTO: 2.31 X10(3) UL (ref 1–4)
LYMPHOCYTES NFR BLD AUTO: 9.5 %
MAGNESIUM SERPL-MCNC: 1.4 MG/DL (ref 1.6–2.6)
MCH RBC QN AUTO: 31.2 PG (ref 26–34)
MCH RBC QN AUTO: 32.1 PG (ref 26–34)
MCHC RBC AUTO-ENTMCNC: 35.8 G/DL (ref 31–37)
MCHC RBC AUTO-ENTMCNC: 36.6 G/DL (ref 31–37)
MCV RBC AUTO: 87.3 FL
MCV RBC AUTO: 87.8 FL
MDMA UR QL SCN: NEGATIVE
METHGB MFR BLD: 0.6 % SAT (ref 0.4–1.5)
MONOCYTES # BLD AUTO: 2.05 X10(3) UL (ref 0.1–1)
MONOCYTES NFR BLD AUTO: 8.4 %
MRSA DNA SPEC QL NAA+PROBE: NEGATIVE
NEUTROPHILS # BLD AUTO: 19.83 X10 (3) UL (ref 1.5–7.7)
NEUTROPHILS # BLD AUTO: 19.83 X10(3) UL (ref 1.5–7.7)
NEUTROPHILS NFR BLD AUTO: 81.2 %
OSMOLALITY SERPL CALC.SUM OF ELEC: 286 MOSM/KG (ref 275–295)
OSMOLALITY SERPL CALC.SUM OF ELEC: 288 MOSM/KG (ref 275–295)
OXYCODONE UR QL SCN: NEGATIVE
OXYHGB MFR BLDA: 97.4 % (ref 92–100)
OXYHGB MFR BLDV: 94.9 % (ref 72–78)
PCO2 BLDA: 36 MM HG (ref 35–45)
PEEP: 5 CM H2O
PH BLDA: 7.33 [PH] (ref 7.35–7.45)
PH BLDV: 7.46 [PH] (ref 7.33–7.43)
PHOSPHATE SERPL-MCNC: 2.6 MG/DL (ref 2.4–5.1)
PLATELET # BLD AUTO: 295 10(3)UL (ref 150–450)
PLATELET # BLD AUTO: 419 10(3)UL (ref 150–450)
PO2 BLDA: 131 MM HG (ref 80–100)
PO2 BLDV: 78 MM HG (ref 30–50)
POTASSIUM BLD-SCNC: 4.5 MMOL/L (ref 3.6–5.1)
POTASSIUM SERPL-SCNC: 4.1 MMOL/L (ref 3.5–5.1)
POTASSIUM SERPL-SCNC: 4.2 MMOL/L (ref 3.5–5.1)
PROT SERPL-MCNC: 5.6 G/DL (ref 5.7–8.2)
PROT SERPL-MCNC: 7 G/DL (ref 5.7–8.2)
PROTHROMBIN TIME: 14.8 SECONDS (ref 11.6–14.8)
RBC # BLD AUTO: 4.58 X10(6)UL
RBC # BLD AUTO: 4.58 X10(6)UL
RH BLOOD TYPE: NEGATIVE
RH BLOOD TYPE: NEGATIVE
SALICYLATES SERPL-MCNC: <3 MG/DL (ref 3–20)
SODIUM BLD-SCNC: 129 MMOL/L (ref 135–145)
SODIUM SERPL-SCNC: 132 MMOL/L (ref 136–145)
SODIUM SERPL-SCNC: 135 MMOL/L (ref 136–145)
TIDAL VOLUME: 450 ML
VENT RATE: 16 /MIN
WBC # BLD AUTO: 17.9 X10(3) UL (ref 4–11)
WBC # BLD AUTO: 24.4 X10(3) UL (ref 4–11)

## 2024-08-14 PROCEDURE — 0DTB0ZZ RESECTION OF ILEUM, OPEN APPROACH: ICD-10-PCS | Performed by: SURGERY

## 2024-08-14 PROCEDURE — 0DQA0ZZ REPAIR JEJUNUM, OPEN APPROACH: ICD-10-PCS | Performed by: SURGERY

## 2024-08-14 PROCEDURE — 0WCG0ZZ EXTIRPATION OF MATTER FROM PERITONEAL CAVITY, OPEN APPROACH: ICD-10-PCS | Performed by: SURGERY

## 2024-08-14 PROCEDURE — 90792 PSYCH DIAG EVAL W/MED SRVCS: CPT | Performed by: OTHER

## 2024-08-14 PROCEDURE — P9045 ALBUMIN (HUMAN), 5%, 250 ML: HCPCS | Performed by: STUDENT IN AN ORGANIZED HEALTH CARE EDUCATION/TRAINING PROGRAM

## 2024-08-14 PROCEDURE — 71045 X-RAY EXAM CHEST 1 VIEW: CPT | Performed by: NURSE PRACTITIONER

## 2024-08-14 PROCEDURE — 71045 X-RAY EXAM CHEST 1 VIEW: CPT | Performed by: EMERGENCY MEDICINE

## 2024-08-14 PROCEDURE — 99255 IP/OBS CONSLTJ NEW/EST HI 80: CPT | Performed by: SURGERY

## 2024-08-14 PROCEDURE — 36430 TRANSFUSION BLD/BLD COMPNT: CPT | Performed by: STUDENT IN AN ORGANIZED HEALTH CARE EDUCATION/TRAINING PROGRAM

## 2024-08-14 PROCEDURE — 99291 CRITICAL CARE FIRST HOUR: CPT | Performed by: INTERNAL MEDICINE

## 2024-08-14 RX ORDER — BISACODYL 10 MG
10 SUPPOSITORY, RECTAL RECTAL
Status: DISCONTINUED | OUTPATIENT
Start: 2024-08-14 | End: 2024-08-14

## 2024-08-14 RX ORDER — ACETAMINOPHEN 650 MG/1
650 SUPPOSITORY RECTAL EVERY 4 HOURS PRN
Status: DISCONTINUED | OUTPATIENT
Start: 2024-08-14 | End: 2024-08-14

## 2024-08-14 RX ORDER — MIDAZOLAM HYDROCHLORIDE 1 MG/ML
2 INJECTION INTRAMUSCULAR; INTRAVENOUS EVERY 5 MIN PRN
Status: DISCONTINUED | OUTPATIENT
Start: 2024-08-14 | End: 2024-08-14

## 2024-08-14 RX ORDER — PHENYLEPHRINE HCL 10 MG/ML
VIAL (ML) INJECTION AS NEEDED
Status: DISCONTINUED | OUTPATIENT
Start: 2024-08-14 | End: 2024-08-14 | Stop reason: SURG

## 2024-08-14 RX ORDER — ALPRAZOLAM 0.5 MG
0.5 TABLET ORAL 4 TIMES DAILY
COMMUNITY
Start: 2024-07-30 | End: 2024-08-21

## 2024-08-14 RX ORDER — ACETAMINOPHEN 10 MG/ML
1000 INJECTION, SOLUTION INTRAVENOUS EVERY 6 HOURS PRN
Status: DISCONTINUED | OUTPATIENT
Start: 2024-08-14 | End: 2024-08-14

## 2024-08-14 RX ORDER — HYDROCODONE BITARTRATE AND ACETAMINOPHEN 5; 325 MG/1; MG/1
2 TABLET ORAL ONCE AS NEEDED
Status: DISCONTINUED | OUTPATIENT
Start: 2024-08-14 | End: 2024-08-14 | Stop reason: ALTCHOICE

## 2024-08-14 RX ORDER — PROCHLORPERAZINE EDISYLATE 5 MG/ML
5 INJECTION INTRAMUSCULAR; INTRAVENOUS EVERY 8 HOURS PRN
Status: DISCONTINUED | OUTPATIENT
Start: 2024-08-14 | End: 2024-08-14

## 2024-08-14 RX ORDER — CHLORHEXIDINE GLUCONATE ORAL RINSE 1.2 MG/ML
15 SOLUTION DENTAL
Status: DISCONTINUED | OUTPATIENT
Start: 2024-08-14 | End: 2024-08-14

## 2024-08-14 RX ORDER — HYDROMORPHONE HYDROCHLORIDE 1 MG/ML
0.2 INJECTION, SOLUTION INTRAMUSCULAR; INTRAVENOUS; SUBCUTANEOUS EVERY 5 MIN PRN
Status: DISCONTINUED | OUTPATIENT
Start: 2024-08-14 | End: 2024-08-14

## 2024-08-14 RX ORDER — HYDROMORPHONE HYDROCHLORIDE 1 MG/ML
0.4 INJECTION, SOLUTION INTRAMUSCULAR; INTRAVENOUS; SUBCUTANEOUS EVERY 2 HOUR PRN
Status: DISCONTINUED | OUTPATIENT
Start: 2024-08-14 | End: 2024-08-21

## 2024-08-14 RX ORDER — PROCHLORPERAZINE EDISYLATE 5 MG/ML
5 INJECTION INTRAMUSCULAR; INTRAVENOUS EVERY 8 HOURS PRN
Status: DISCONTINUED | OUTPATIENT
Start: 2024-08-14 | End: 2024-08-21

## 2024-08-14 RX ORDER — CEFOXITIN 2 G/1
INJECTION, POWDER, FOR SOLUTION INTRAVENOUS AS NEEDED
Status: DISCONTINUED | OUTPATIENT
Start: 2024-08-14 | End: 2024-08-14 | Stop reason: SURG

## 2024-08-14 RX ORDER — DEXMEDETOMIDINE HYDROCHLORIDE 4 UG/ML
INJECTION, SOLUTION INTRAVENOUS CONTINUOUS
Status: DISCONTINUED | OUTPATIENT
Start: 2024-08-14 | End: 2024-08-14

## 2024-08-14 RX ORDER — SODIUM CHLORIDE 9 MG/ML
INJECTION, SOLUTION INTRAVENOUS CONTINUOUS PRN
Status: DISCONTINUED | OUTPATIENT
Start: 2024-08-14 | End: 2024-08-14 | Stop reason: SURG

## 2024-08-14 RX ORDER — HEPARIN SODIUM 5000 [USP'U]/ML
5000 INJECTION, SOLUTION INTRAVENOUS; SUBCUTANEOUS EVERY 8 HOURS SCHEDULED
Status: DISCONTINUED | OUTPATIENT
Start: 2024-08-14 | End: 2024-08-21

## 2024-08-14 RX ORDER — SODIUM CHLORIDE 9 MG/ML
INJECTION, SOLUTION INTRAVENOUS CONTINUOUS
Status: DISCONTINUED | OUTPATIENT
Start: 2024-08-14 | End: 2024-08-21

## 2024-08-14 RX ORDER — MIDAZOLAM HYDROCHLORIDE 1 MG/ML
INJECTION INTRAMUSCULAR; INTRAVENOUS AS NEEDED
Status: DISCONTINUED | OUTPATIENT
Start: 2024-08-14 | End: 2024-08-14 | Stop reason: SURG

## 2024-08-14 RX ORDER — ONDANSETRON 2 MG/ML
4 INJECTION INTRAMUSCULAR; INTRAVENOUS EVERY 6 HOURS PRN
Status: DISCONTINUED | OUTPATIENT
Start: 2024-08-14 | End: 2024-08-14

## 2024-08-14 RX ORDER — SODIUM CHLORIDE, SODIUM LACTATE, POTASSIUM CHLORIDE, CALCIUM CHLORIDE 600; 310; 30; 20 MG/100ML; MG/100ML; MG/100ML; MG/100ML
INJECTION, SOLUTION INTRAVENOUS CONTINUOUS
Status: DISCONTINUED | OUTPATIENT
Start: 2024-08-14 | End: 2024-08-14

## 2024-08-14 RX ORDER — FLUTICASONE PROPIONATE 50 MCG
1 SPRAY, SUSPENSION (ML) NASAL EVERY 6 HOURS PRN
COMMUNITY
Start: 2024-07-11

## 2024-08-14 RX ORDER — ONDANSETRON 2 MG/ML
4 INJECTION INTRAMUSCULAR; INTRAVENOUS EVERY 4 HOURS PRN
Status: DISCONTINUED | OUTPATIENT
Start: 2024-08-14 | End: 2024-08-14

## 2024-08-14 RX ORDER — HYDROMORPHONE HYDROCHLORIDE 1 MG/ML
0.5 INJECTION, SOLUTION INTRAMUSCULAR; INTRAVENOUS; SUBCUTANEOUS EVERY 30 MIN PRN
Status: DISCONTINUED | OUTPATIENT
Start: 2024-08-14 | End: 2024-08-14

## 2024-08-14 RX ORDER — POLYETHYLENE GLYCOL 3350 17 G/17G
17 POWDER, FOR SOLUTION ORAL DAILY PRN
Status: DISCONTINUED | OUTPATIENT
Start: 2024-08-14 | End: 2024-08-14

## 2024-08-14 RX ORDER — CETIRIZINE HYDROCHLORIDE 10 MG/1
10 TABLET ORAL DAILY
COMMUNITY

## 2024-08-14 RX ORDER — LORAZEPAM 2 MG/ML
1 INJECTION INTRAMUSCULAR EVERY 6 HOURS PRN
Status: DISCONTINUED | OUTPATIENT
Start: 2024-08-14 | End: 2024-08-15

## 2024-08-14 RX ORDER — HYDROMORPHONE HYDROCHLORIDE 1 MG/ML
0.8 INJECTION, SOLUTION INTRAMUSCULAR; INTRAVENOUS; SUBCUTANEOUS EVERY 2 HOUR PRN
Status: DISCONTINUED | OUTPATIENT
Start: 2024-08-14 | End: 2024-08-21

## 2024-08-14 RX ORDER — ONDANSETRON 2 MG/ML
4 INJECTION INTRAMUSCULAR; INTRAVENOUS ONCE
Status: COMPLETED | OUTPATIENT
Start: 2024-08-14 | End: 2024-08-14

## 2024-08-14 RX ORDER — ONDANSETRON 2 MG/ML
4 INJECTION INTRAMUSCULAR; INTRAVENOUS EVERY 6 HOURS PRN
Status: DISCONTINUED | OUTPATIENT
Start: 2024-08-14 | End: 2024-08-21

## 2024-08-14 RX ORDER — TEMAZEPAM 7.5 MG/1
15 CAPSULE ORAL NIGHTLY
COMMUNITY
Start: 2024-07-31 | End: 2024-08-21

## 2024-08-14 RX ORDER — SENNOSIDES 8.8 MG/5ML
10 LIQUID ORAL NIGHTLY PRN
Status: DISCONTINUED | OUTPATIENT
Start: 2024-08-14 | End: 2024-08-14

## 2024-08-14 RX ORDER — LABETALOL HYDROCHLORIDE 5 MG/ML
5 INJECTION, SOLUTION INTRAVENOUS EVERY 5 MIN PRN
Status: DISCONTINUED | OUTPATIENT
Start: 2024-08-14 | End: 2024-08-14

## 2024-08-14 RX ORDER — ACETAMINOPHEN 160 MG/5ML
650 SOLUTION ORAL EVERY 4 HOURS PRN
Status: DISCONTINUED | OUTPATIENT
Start: 2024-08-14 | End: 2024-08-14

## 2024-08-14 RX ORDER — ALBUMIN, HUMAN INJ 5% 5 %
SOLUTION INTRAVENOUS CONTINUOUS PRN
Status: DISCONTINUED | OUTPATIENT
Start: 2024-08-14 | End: 2024-08-14 | Stop reason: SURG

## 2024-08-14 RX ORDER — SODIUM CHLORIDE, SODIUM LACTATE, POTASSIUM CHLORIDE, CALCIUM CHLORIDE 600; 310; 30; 20 MG/100ML; MG/100ML; MG/100ML; MG/100ML
INJECTION, SOLUTION INTRAVENOUS CONTINUOUS PRN
Status: DISCONTINUED | OUTPATIENT
Start: 2024-08-14 | End: 2024-08-14 | Stop reason: SURG

## 2024-08-14 RX ORDER — ACETAMINOPHEN 500 MG
1000 TABLET ORAL ONCE AS NEEDED
Status: DISCONTINUED | OUTPATIENT
Start: 2024-08-14 | End: 2024-08-14 | Stop reason: ALTCHOICE

## 2024-08-14 RX ORDER — HYDROMORPHONE HYDROCHLORIDE 1 MG/ML
0.6 INJECTION, SOLUTION INTRAMUSCULAR; INTRAVENOUS; SUBCUTANEOUS EVERY 5 MIN PRN
Status: DISCONTINUED | OUTPATIENT
Start: 2024-08-14 | End: 2024-08-14

## 2024-08-14 RX ORDER — HYDROMORPHONE HYDROCHLORIDE 1 MG/ML
0.4 INJECTION, SOLUTION INTRAMUSCULAR; INTRAVENOUS; SUBCUTANEOUS EVERY 5 MIN PRN
Status: DISCONTINUED | OUTPATIENT
Start: 2024-08-14 | End: 2024-08-14

## 2024-08-14 RX ORDER — BUPROPION HYDROCHLORIDE 150 MG/1
150 TABLET ORAL DAILY
Status: ON HOLD | COMMUNITY
End: 2024-08-21

## 2024-08-14 RX ORDER — ACETAMINOPHEN 325 MG/1
650 TABLET ORAL EVERY 4 HOURS PRN
Status: DISCONTINUED | OUTPATIENT
Start: 2024-08-14 | End: 2024-08-14

## 2024-08-14 RX ORDER — NALOXONE HYDROCHLORIDE 0.4 MG/ML
0.08 INJECTION, SOLUTION INTRAMUSCULAR; INTRAVENOUS; SUBCUTANEOUS AS NEEDED
Status: DISCONTINUED | OUTPATIENT
Start: 2024-08-14 | End: 2024-08-14

## 2024-08-14 RX ORDER — LIDOCAINE HYDROCHLORIDE 10 MG/ML
INJECTION, SOLUTION EPIDURAL; INFILTRATION; INTRACAUDAL; PERINEURAL AS NEEDED
Status: DISCONTINUED | OUTPATIENT
Start: 2024-08-14 | End: 2024-08-14 | Stop reason: SURG

## 2024-08-14 RX ORDER — HYDROCODONE BITARTRATE AND ACETAMINOPHEN 5; 325 MG/1; MG/1
1 TABLET ORAL ONCE AS NEEDED
Status: DISCONTINUED | OUTPATIENT
Start: 2024-08-14 | End: 2024-08-14 | Stop reason: ALTCHOICE

## 2024-08-14 RX ORDER — HYDROMORPHONE HYDROCHLORIDE 1 MG/ML
0.2 INJECTION, SOLUTION INTRAMUSCULAR; INTRAVENOUS; SUBCUTANEOUS EVERY 2 HOUR PRN
Status: DISCONTINUED | OUTPATIENT
Start: 2024-08-14 | End: 2024-08-21

## 2024-08-14 RX ORDER — ROCURONIUM BROMIDE 10 MG/ML
INJECTION, SOLUTION INTRAVENOUS AS NEEDED
Status: DISCONTINUED | OUTPATIENT
Start: 2024-08-14 | End: 2024-08-14 | Stop reason: SURG

## 2024-08-14 RX ADMIN — PHENYLEPHRINE HCL 100 MCG: 10 MG/ML VIAL (ML) INJECTION at 13:33:00

## 2024-08-14 RX ADMIN — ROCURONIUM BROMIDE 10 MG: 10 INJECTION, SOLUTION INTRAVENOUS at 13:30:00

## 2024-08-14 RX ADMIN — SODIUM CHLORIDE, SODIUM LACTATE, POTASSIUM CHLORIDE, CALCIUM CHLORIDE: 600; 310; 30; 20 INJECTION, SOLUTION INTRAVENOUS at 12:44:00

## 2024-08-14 RX ADMIN — ALBUMIN, HUMAN INJ 5%: 5 SOLUTION INTRAVENOUS at 13:44:00

## 2024-08-14 RX ADMIN — CEFOXITIN 2 G: 2 INJECTION, POWDER, FOR SOLUTION INTRAVENOUS at 13:31:00

## 2024-08-14 RX ADMIN — SODIUM CHLORIDE: 9 INJECTION, SOLUTION INTRAVENOUS at 11:53:00

## 2024-08-14 RX ADMIN — SODIUM CHLORIDE: 9 INJECTION, SOLUTION INTRAVENOUS at 13:37:00

## 2024-08-14 RX ADMIN — SODIUM CHLORIDE, SODIUM LACTATE, POTASSIUM CHLORIDE, CALCIUM CHLORIDE: 600; 310; 30; 20 INJECTION, SOLUTION INTRAVENOUS at 14:12:00

## 2024-08-14 RX ADMIN — LIDOCAINE HYDROCHLORIDE 50 MG: 10 INJECTION, SOLUTION EPIDURAL; INFILTRATION; INTRACAUDAL; PERINEURAL at 12:06:00

## 2024-08-14 RX ADMIN — SODIUM CHLORIDE, SODIUM LACTATE, POTASSIUM CHLORIDE, CALCIUM CHLORIDE: 600; 310; 30; 20 INJECTION, SOLUTION INTRAVENOUS at 11:53:00

## 2024-08-14 RX ADMIN — PHENYLEPHRINE HCL 100 MCG: 10 MG/ML VIAL (ML) INJECTION at 12:07:00

## 2024-08-14 RX ADMIN — PHENYLEPHRINE HCL 200 MCG: 10 MG/ML VIAL (ML) INJECTION at 12:09:00

## 2024-08-14 RX ADMIN — ROCURONIUM BROMIDE 20 MG: 10 INJECTION, SOLUTION INTRAVENOUS at 13:53:00

## 2024-08-14 RX ADMIN — MIDAZOLAM HYDROCHLORIDE 2 MG: 1 INJECTION INTRAMUSCULAR; INTRAVENOUS at 12:03:00

## 2024-08-14 RX ADMIN — ALBUMIN, HUMAN INJ 5%: 5 SOLUTION INTRAVENOUS at 13:37:00

## 2024-08-14 RX ADMIN — ROCURONIUM BROMIDE 100 MG: 10 INJECTION, SOLUTION INTRAVENOUS at 12:06:00

## 2024-08-14 NOTE — PROGRESS NOTES
08/14/24 1609   Vent Information   $ RT Standby Charge (per 15 min) 1   Vent Initiation Date 08/14/24   Vent Initiation Time 1500   Ventilation Day(s) 1   Interface Invasive   Vent Type    Vent plugged into main power? Yes   Vent -10   Vent Mode PS   Settings   FiO2 (%) (S)  40 %   PEEP/CPAP (cm H2O) (S)  5 cm H20   Press Support CWP (S)  5   Humidification Heater   H2O Bag Level 3/4 Full   Heater Temperature 98.2 °F (36.8 °C)   Readings   Total RR 18   Minute Ventilation (L/min) 9.7 L/min   Vt Spontaneous (mL) 491 mL   PIP Observed (cm H2O) 11 cm H2O   MAP (cm H2O) 7.8   Alarms   High RR 40   Insp Pressure High (cm H2O) 40 cm H2O   MV High (L/min) 20 L/min   MV Low (L/min) 3 L/min   Apnea Interval (sec) 20 seconds   Apnea Rate 16   Apnea Volume (mL) 450 mL   Spontaneous Breathing Trial   Is the FiO2 <= 0.5? (titrated for sats 92-94%) Y   Is the PEEP <= 5? Y   Is the RSBI <=104 Y   Is the patient's cough adequate? Y   Is the patient alert (neuro), able to follow commands? Y   Daily Screen Meets All Criteria Yes   Spontaneous Parameters   Spontaneous RR Rate 22   Spontaneous Minute Volume 8.85   Average Spontaneous Tidal Volume 442   $ Spontaneous Vital Capacity 1295   Negative Inspiratory Force -23   Total RSBI 49.9   Weaning Trials   Spontaneous Breathing Trial Time Initiated 1606   Spontaneous Breathing Trial Duration 15   Spontaneous Breathing Trial Method ps   Spontaneous Breathing Trial Settings 5/5   Pre Trial HR 89   Pre Trial RR 22   Pre Trial SpO2 93 %   Pre Trial /92   Pre Trial Vt 442   Post Trial HR 85   Post Trial RR 22   Post Trial SpO2 92 %   Post Trial /84   Post Trial Vt 364   ETT   Placement Date/Time: 08/14/24 (c) 1208   Airway Size: 7.5 mm  Cuffed: Cuffed  Insertion attempts: 1  Technique: Direct laryngoscopy  Placement Verification: Capnometry  Placed By: Anesthesiologist   Secured at (cm) 22 cm   Cuff Pressure (cm H2O) 26 cm H2O   Suctioned? N   Measured From Lips    Secured Location Right   Secured by Commercial tube bennett   Site Condition Dry   Req'd equipment at bedside Bag mask   Pt extubated @ 1616 MD didn't want ABG on spontaneous trial placed on 3L after extubation

## 2024-08-14 NOTE — ANESTHESIA PROCEDURE NOTES
Arterial Line    Performed by: Omar Uriostegui MD  Authorized by: Omar Uriostegui MD    General Information and Staff    Procedure Start:   Anesthesiologist:  Omar Uriostegui MD  Performed By:  Anesthesiologist  Patient Location:  OR  Indication: continuous blood pressure monitoring and blood sampling needed    Site Identification: surface landmarks    Preanesthetic Checklist: 2 patient identifiers, IV checked, risks and benefits discussed, monitors and equipment checked, pre-op evaluation, timeout performed, anesthesia consent and sterile technique used    Procedure Details    Catheter Size:  20 G  Catheter Length:  1 and 3/4 inch  Catheter Type:  Arrow  Seldinger Technique?: Yes    Laterality:  Right  Site:  Radial artery  Site Prep: chlorhexidine    Line Secured:  Wrist Brace, tape and Tegaderm    Assessment    Events: patient tolerated procedure well with no complications      Medications      Additional Comments

## 2024-08-14 NOTE — ANESTHESIA PROCEDURE NOTES
Airway  Date/Time: 8/14/2024 12:07 PM  Urgency: elective      General Information and Staff    Patient location during procedure: OR  Anesthesiologist: Omar Uriostegui MD  Performed: anesthesiologist   Performed by: Omar Uriostegui MD  Authorized by: Omar Uriostegui MD      Indications and Patient Condition  Indications for airway management: anesthesia  Sedation level: deep  Preoxygenated: yes  Patient position: sniffing  Mask difficulty assessment: 0 - not attempted    Final Airway Details  Final airway type: endotracheal airway      Successful airway: ETT  Cuffed: yes   Successful intubation technique: direct laryngoscopy  Facilitating devices/methods: rapid sequence intubation  Endotracheal tube insertion site: oral  Blade: Lorene  Blade size: #3  ETT size (mm): 7.5    Cormack-Lehane Classification: grade I - full view of glottis  Placement verified by: capnometry   Measured from: lips  ETT to lips (cm): 21  Number of attempts at approach: 1

## 2024-08-14 NOTE — ANESTHESIA PREPROCEDURE EVALUATION
PRE-OP EVALUATION    Patient Name: Kirby Conteh    Admit Diagnosis: No admission diagnoses are documented for this encounter.    Pre-op Diagnosis: Perioperative dehiscence of abdominal wound with evisceration [T81.31XA]    EXPLORATORY LAPAROTOMY, POSSIBLE BOWEL RESECTION, POSSIBLE CREATION OF OSTOMY    Anesthesia Procedure: EXPLORATORY LAPAROTOMY, POSSIBLE BOWEL RESECTION, POSSIBLE CREATION OF OSTOMY (Abdomen)    Surgeons and Role:     * Nikkie Chavez MD - Primary    Pre-op vitals reviewed.  Temp: 98.9 °F (37.2 °C)  Pulse: 78  Resp: 28  BP: 121/93  SpO2: 96 %  Body mass index is 23.43 kg/m².    Current medications reviewed.  Hospital Medications:   [COMPLETED] Tetanus-Diphth-Acell Pertussis (Tdap) (Boostrix) injection 0.5 mL  0.5 mL Intramuscular Once    sodium chloride 0.9 % IV bolus 1,000 mL  1,000 mL Intravenous Once    sodium chloride 0.9 % IV bolus 1,000 mL  1,000 mL Intravenous Once    HYDROmorphone (Dilaudid) 1 MG/ML injection 0.5 mg  0.5 mg Intravenous Q30 Min PRN    [COMPLETED] ondansetron (Zofran) 4 MG/2ML injection 4 mg  4 mg Intravenous Once    cefOXitin (Mefoxin) 2 g in sodium chloride 0.9% 100 mL IVPB-MBP  2 g Intravenous Once       Outpatient Medications:   (Not in a hospital admission)      Allergies: Patient has no known allergies.      Anesthesia Evaluation        Anesthetic Complications           GI/Hepatic/Renal    Negative GI/hepatic/renal ROS.                             Cardiovascular            MET: >4                                           Endo/Other    Negative endo/other ROS.                              Pulmonary    Negative pulmonary ROS.                       Neuro/Psych                                      History reviewed. No pertinent surgical history.  Social History     Socioeconomic History    Marital status: Single   Tobacco Use    Smoking status: Never    Smokeless tobacco: Never   Vaping Use    Vaping status: Never Used   Substance and Sexual Activity    Alcohol use:  Yes     Comment: \"a few\"    Drug use: Never     History   Drug Use Unknown     Available pre-op labs reviewed.  Lab Results   Component Value Date    WBC 24.4 (H) 08/14/2024    RBC 4.58 08/14/2024    HGB 14.7 08/14/2024    HCT 40.2 08/14/2024    MCV 87.8 08/14/2024    MCH 32.1 08/14/2024    MCHC 36.6 08/14/2024    RDW 12.2 08/14/2024    .0 08/14/2024               Airway      Mallampati: II  Mouth opening: 3 FB  TM distance: 4 - 6 cm   Cardiovascular    Cardiovascular exam normal.         Dental    Dentition appears grossly intact         Pulmonary    Pulmonary exam normal.                 Other findings              ASA: 2 and emergent  Plan: general  NPO status verified and           Plan/risks discussed with: patient                Present on Admission:  **None**

## 2024-08-14 NOTE — PROGRESS NOTES
PSYCH CONSULT    Date of Admission: 8/14/2024  Date of Consult: 8/14/2024  Reason for Consultation: Suicide attempt    Impression:  Psychiatric Diagnoses:  Suicide attempt by cutting his left wrist and stabbing himself in the abdomen on 8/13/24. He was just hospitalized at [facility] psych unit in [place] from 8/5/24 to 8/13/24. Sister is not aware of any specific triggers or new stressors contributing to the suicide attempt.     Major depressive disorder, recurrent, severe +/- psychotic features. Per sister, he was making paranoid comments to her about being locked up in the psych unit and how they were not going to let him leave.    Anxiety disorder unspecified.    Insomnia unspecified, likely related to depression.      Rule Out Diagnoses:  Bipolar 2 depression.     Personality Diagnosis:  OCPD traits per hx from sister.      Pertinent Medical Diagnoses:  Self-inflicted abdominal wound s/p ex-lap and evacuation of intra-abdominal hematoma, small bowel resection, and repair of enterotomy on 8/14/24.    Recommendations:  1) Urine drug screen.     2) When no longer NPO, restart Wellbutrin XL 150mg po daily for depression. This was started in the psych unit last week. Sister does not think he started the Zoloft that was prescribed on 7/31/24 by his primary care MD.     3) Start Ativan 1mg IV every 6 hrs PRN anxiety or agitation or insomnia. He was taking Restoril 15mg nightly for sleep and Xanax 0.5mg four times a day PRN anxiety when he was in the psych unit last week per hx from sister.     4) Continue suicidal precautions and 1:1 sitter. He will be transferred to the psych unit when medically clear.     Discussed plan above with patient's sister Miguelina [phone].    Full note to follow.    Dr. Enrique Weldon

## 2024-08-14 NOTE — CONSULTS
[facility]  Report of Psychiatric Consultation    Kirby Conteh Patient Status:  Inpatient    1965 MRN [id number]   Location [facility] 4SW-A Attending Nikkie Chavez MD   Hosp Day # 0 PCP SALVATORE HILL DO     Date of Admission: 2024  Date of Consult: 2024  Reason for Consultation: Suicide attempt    Impression:  Psychiatric Diagnoses:  Suicide attempt by cutting his left wrist and stabbing himself in the abdomen on 24. He was just hospitalized at [facility] psych unit in [place] from 24 to 24. Sister is not aware of any specific triggers or new stressors contributing to the suicide attempt.     Major depressive disorder, recurrent, severe +/- psychotic features. Per sister, he was making paranoid comments to her about being locked up in the psych unit and how they were not going to let him leave.    Anxiety disorder unspecified.    Insomnia unspecified, likely related to depression.      Rule Out Diagnoses:  Bipolar 2 depression.     Personality Diagnosis:  OCPD traits per hx from sister.      Pertinent Medical Diagnoses:  Self-inflicted abdominal wound s/p ex-lap and evacuation of intra-abdominal hematoma, small bowel resection, and repair of enterotomy on 24.    Recommendations:  1) Urine drug screen.     2) When no longer NPO, restart Wellbutrin XL 150mg po daily for depression. This was started in the psych unit last week. Sister does not think he started the Zoloft that was prescribed on 24 by his primary care MD.     3) Start Ativan 1mg IV every 6 hrs PRN anxiety or agitation or insomnia. He was taking Restoril 15mg nightly for sleep and Xanax 0.5mg four times a day PRN anxiety when he was in the psych unit last week per hx from sister.     4) Continue suicidal precautions and 1:1 sitter. He will be transferred to the psych unit when medically clear.     Discussed plan above with patient's sister Miguelina [phone].    Enrique Weldon  MD  8/14/2024  3:22 PM    History of Present Illness:  58 yo male with recurrent major depression and hx anxiety was admitted on 8/14/24 after attempting suicide by cutting his left wrist and stabbing himself in the abdomen on 8/13/24.     Per hx from sister, he has a hx of recurrent depression that started in his 20's. She recalls him endorsing sadness, low motivation, apathy, and a lack of enjoyment in life during these episodes. He was very depressed after his divorce 15 yrs ago but was still able to function and work. She is not aware of him having suicidal ideation until the past month.     About 1 month ago, he called her complaining of many neurologic symptoms like numbness and tingling and his neck and arms and legs. He c/o headache and sinus fullness and was diagnosed with an ear infection. 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 He was very anxious about having a physical illness and c/o insomnia. He endorsed \"mental deterioration\" and feeling depressed and helpless and hopeless. She encouraged him to seek help. He saw his primary MD 2 wks ago and was prescribed Temazepam at 7.5mg nightly which did not help with sleep or anxiety. He was prescribed Zoloft which she doesn't think he started. Due to his worsening anxiety and depression, she had him visit her in [facility]. During his visit with her, he admitted to having suicidal ideation. She brought him to the local ED where he was certified for inpatient psych treatment. There were no psych beds in [facility] so he was transferred to [facility] psych unit in [place] on 8/5/2024. He was treated with Wellbutrin XL 150mg daily for depression and Xanax 0.25mg QID titrated up to 0.5mg QID for anxiety and Restoril 15mg nightly for insomnia. He would call her and appeared paranoid, talking about how he felt locked up in the psych unit. He no longer had suicidal ideation and was discharged home on 8/13/24. Sister's  had to have a  medical procedure, so she was unable to pick him up and drive him home. The psych unit set up a cab for him to go home.     Today, sister visited him and found him in the bathtub with the lacerated wrist and the self-inflicted abdominal wound. She called 911 immediately and he was brought to the ED. He went to the OR today and is s/p ex-lap and evacuation of intra-abdominal hematoma, small bowel resection, and repair of enterotomy. He remains intubated.  Sister is not aware of any specific triggers or new stressors contributing to the suicide attempt.    Past Psychiatric History:  1) Major depressive disorder, recurrent. He had his first depressive episode in his 20's. About 15 yrs ago, he had an episode when he was going through a divorce. Hx of suicidal ideation but no known attempts per hx from sister.     2) Anxiety unspecified.     Substance Use History: None per sister.    Psych Family History: Mother with life long hx of depression. One psychiatrist thought his mother had Bipolar 2 disorder.     Social and Developmental History:  with a 15 yr old daughter. 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 He has a masters in [employer].     Past Medical History:    Anxiety    Depression     History reviewed. No pertinent surgical history.  History reviewed. No pertinent family history.   reports that he has never smoked. He has never used smokeless tobacco. He reports current alcohol use. He reports that he does not use drugs.    Allergies:  No Known Allergies    Medications:    Current Facility-Administered Medications:     [Transfer Hold] HYDROmorphone (Dilaudid) 1 MG/ML injection 0.5 mg, 0.5 mg, Intravenous, Q30 Min PRN    lactated ringers infusion, , Intravenous, Continuous    lactated ringers IV bolus 500 mL, 500 mL, Intravenous, Once PRN    atropine 0.1 MG/ML injection 0.5 mg, 0.5 mg, Intravenous, PRN    labetalol (Trandate) 5 mg/mL injection 5 mg, 5 mg, Intravenous, Q5 Min PRN    naloxone (Narcan) 0.4 MG/ML  injection 0.08 mg, 0.08 mg, Intravenous, PRN    fentaNYL (Sublimaze) 50 mcg/mL injection 25 mcg, 25 mcg, Intravenous, Q5 Min PRN **OR** fentaNYL (Sublimaze) 50 mcg/mL injection 50 mcg, 50 mcg, Intravenous, Q5 Min PRN    HYDROmorphone (Dilaudid) 1 MG/ML injection 0.2 mg, 0.2 mg, Intravenous, Q5 Min PRN **OR** HYDROmorphone (Dilaudid) 1 MG/ML injection 0.4 mg, 0.4 mg, Intravenous, Q5 Min PRN **OR** HYDROmorphone (Dilaudid) 1 MG/ML injection 0.6 mg, 0.6 mg, Intravenous, Q5 Min PRN    sodium chloride 0.9% infusion, , Intravenous, Continuous    heparin (Porcine) 5000 UNIT/ML injection 5,000 Units, 5,000 Units, Subcutaneous, Q8H ROLY    [Held by provider] HYDROmorphone (Dilaudid) 1 MG/ML injection 0.2 mg, 0.2 mg, Intravenous, Q2H PRN **OR** [Held by provider] HYDROmorphone (Dilaudid) 1 MG/ML injection 0.4 mg, 0.4 mg, Intravenous, Q2H PRN **OR** [Held by provider] HYDROmorphone (Dilaudid) 1 MG/ML injection 0.8 mg, 0.8 mg, Intravenous, Q2H PRN    ondansetron (Zofran) 4 MG/2ML injection 4 mg, 4 mg, Intravenous, Q6H PRN    prochlorperazine (Compazine) 10 MG/2ML injection 5 mg, 5 mg, Intravenous, Q8H PRN    piperacillin-tazobactam (Zosyn) 4.5 g in dextrose 5% 100 mL IVPB-ADDV, 4.5 g, Intravenous, q12h    propofol (Diprivan) 10 MG/ML injection, , ,     fentaNYL (Sublimaze) 50 mcg/mL injection 25 mcg, 25 mcg, Intravenous, Q30 Min PRN **OR** fentaNYL (Sublimaze) 50 mcg/mL injection 50 mcg, 50 mcg, Intravenous, Q30 Min PRN    acetaminophen (Tylenol) tab 650 mg, 650 mg, Oral, Q4H PRN **OR** acetaminophen (Tylenol) 160 MG/5ML oral liquid 650 mg, 650 mg, Oral, Q4H PRN **OR** acetaminophen (Tylenol) rectal suppository 650 mg, 650 mg, Rectal, Q4H PRN **OR** acetaminophen (Ofirmev) 10 mg/mL infusion premix 1,000 mg, 1,000 mg, Intravenous, Q6H PRN    fentaNYL (Sublimaze) 25 mcg BOLUS FROM BAG infusion, 25 mcg, Intravenous, Q30 Min PRN **OR** fentaNYL (Sublimaze) 50 mcg BOLUS FROM BAG infusion, 50 mcg, Intravenous, Q30 Min PRN    fentaNYL  in sodium chloride 0.9% (Sublimaze) 1000 mcg/100mL infusion premix,  mcg/hr, Intravenous, Continuous PRN    polyethylene glycol (PEG 3350) (Miralax) 17 g oral packet 17 g, 17 g, Oral, Daily PRN    senna (Senokot) 8.8 MG/5ML oral syrup 17.6 mg, 10 mL, Oral, Nightly PRN    bisacodyl (Dulcolax) 10 MG rectal suppository 10 mg, 10 mg, Rectal, Daily PRN    chlorhexidine gluconate (Peridex) 0.12 % oral solution 15 mL, 15 mL, Mouth/Throat, BID@0800,2000    midazolam (Versed) 2 MG/2ML injection 2 mg, 2 mg, Intravenous, Q5 Min PRN    dexmedeTOMIDine in sodium chloride 0.9% (Precedex) 400 mcg/100mL infusion premix, 0.2-1.5 mcg/kg/hr, Intravenous, Continuous    propofol (Diprivan) 10 mg/mL infusion premix, 5-50 mcg/kg/min, Intravenous, Continuous    [START ON 8/15/2024] pantoprazole (Protonix) 40 mg in sodium chloride 0.9% PF 10 mL IV push, 40 mg, Intravenous, QAM AC    Review of Systems   Unable to perform ROS: Intubated       Mental Status Exam:     Objective      Vitals:    08/14/24 1504   BP:    Pulse: 82   Resp: 19   Temp:      Appearance: fair grooming, intubated and on the ventilator  Behavior: calm while sedated  Gait: N/A    Speech: unable to assess    Mood: unable to assess  Affect: calm while sedated    Thought process: unable to assess  Thought content: unable to assess    Orientation: unable to assess  Attention and Concentration: unable to assess  Memory:  unable to assess  Language: unable to assess  Fund of Knowledge: unable to assess    Insight: unable to assess  Judgment: POOR     Laboratory Data:  Lab Results   Component Value Date    WBC 24.4 08/14/2024    HGB 14.7 08/14/2024    HCT 40.2 08/14/2024    .0 08/14/2024    CREATSERUM 3.39 08/14/2024    BUN 35 08/14/2024     08/14/2024    K 4.1 08/14/2024    CL 98 08/14/2024    CO2 23.0 08/14/2024     08/14/2024    CA 10.3 08/14/2024    ALB 4.6 08/14/2024    ALKPHO 61 08/14/2024    BILT 1.5 08/14/2024    TP 7.0 08/14/2024    AST 30  08/14/2024    ALT 36 08/14/2024    PTT 30.4 08/14/2024    INR 1.15 08/14/2024    PTP 14.8 08/14/2024     08/14/2024    ETOH <3 08/14/2024    PGLU 178 08/14/2024

## 2024-08-14 NOTE — ED NOTES
Petition and cert reviewed, petition activated.  Petition, cert and rights in original form tubed up to patients floor.  Copies scanned into patients epic chart and also sent SECURE to [facility]. Psych placed on consult.

## 2024-08-14 NOTE — PLAN OF CARE
Received patient from OR at 1455. Patient intubated and sedated on propofol. Patient extubated at 1618, now on 3L NC. 1:1 sitter initiated. Patient Aox4. Prn dilaudid for pt report of abdominal pain. Patient's sister Miguelina visited bedside and updated on plan of care.

## 2024-08-14 NOTE — ED PROVIDER NOTES
Patient Seen in: [facility] 4sw-a      History     Chief Complaint   Patient presents with    Trauma 1 & 2     Stated Complaint: trauma 1    Subjective:   HPI    89-year-old male with a history of depression attempted suicide at home by slashing his abdomen yesterday and lying in a bathtub.  He also cut his left arm.  He was found by his sister today who called paramedics who transported the patient to the emergency department.  Patient was hospitalized at [facility] for suicidal ideations up until yesterday morning when he was discharged to home.  Because of the nature of the injuries including penetrating trauma to the torso, level 1 trauma was called.    Objective:   Past Medical History:    Anxiety    Depression              History reviewed. No pertinent surgical history.             Social History     Socioeconomic History    Marital status: Single   Tobacco Use    Smoking status: Never    Smokeless tobacco: Never   Vaping Use    Vaping status: Never Used   Substance and Sexual Activity    Alcohol use: Yes     Comment: \"a few\"    Drug use: Never              Review of Systems    Positive for stated Chief Complaint: Trauma 1 & 2    Other systems are as noted in HPI.  Constitutional and vital signs reviewed.      All other systems reviewed and negative except as noted above.    Physical Exam     ED Triage Vitals   BP 08/14/24 1118 119/87   Pulse 08/14/24 1118 119   Resp 08/14/24 1118 26   Temp 08/14/24 1118 98.9 °F (37.2 °C)   Temp src 08/14/24 1118 Temporal   SpO2 08/14/24 1118 95 %   O2 Device 08/14/24 1116 None (Room air)       Current Vitals:   Vital Signs  BP: (!) 135/92  Pulse: 85  Resp: 22  Temp: 99.2 °F (37.3 °C)  Temp src: Temporal  MAP (mmHg): (!) 102    Oxygen Therapy  SpO2: 93 %  O2 Device: Nasal cannula  FiO2 (%): (S) 40 %  O2 Flow Rate (L/min): 3 L/min  Pulse Oximetry Type: Continuous  Oximetry Probe Site Changed: No  Pulse Ox Probe Location: Left hand            Physical  Exam    General Appearance: This is a middle-aged male lying on a gurney.  Vital signs were reviewed per nurses notes.  Blood pressure is 135/92.  Monitor initially was 120 and a sinus tachycardia.  Temperature was 99.2.  Pulse oximetry 93% on room air.  HEENT: Normocephalic atraumatic.  Anicteric sclera.  Oral mucosa is moist.  Neck: No adenopathy or thyromegaly.  Lungs are clear breath sounds are equal.  Heart exam: Normal S1-S2 without extra sounds or murmurs.  Regular rate and rhythm.  Abdomen: There are 2 large lacerations across the upper abdomen in a horizontal fashion with evisceration.  No active bleeding was noted.  Extremities: Right upper extremity and both lower extremities are atraumatic.  Left upper extremity-there is a several centimeter long horizontal laceration on the palmar surface of the distal forearm with apparent tendon rupture.  Radial pulses present.  Skin is pale and dry.  Neuroexam: Alert and oriented x 4.  Speech is fluent.  Moving all 4 extremities well.    ED Course     Labs Reviewed   CBC WITH DIFFERENTIAL WITH PLATELET - Abnormal; Notable for the following components:       Result Value    WBC 24.4 (*)     Neutrophil Absolute Prelim 19.83 (*)     Neutrophil Absolute 19.83 (*)     Monocyte Absolute 2.05 (*)     All other components within normal limits   COMP METABOLIC PANEL (14) - Abnormal; Notable for the following components:    Glucose 164 (*)     Sodium 132 (*)     BUN 35 (*)     Creatinine 3.39 (*)     eGFR-Cr 20 (*)     Bilirubin, Total 1.5 (*)     All other components within normal limits   ACETAMINOPHEN (TYLENOL), S - Abnormal; Notable for the following components:    Acetaminophen 4.4 (*)     All other components within normal limits   SALICYLATE, SERUM - Abnormal; Notable for the following components:    Salicylate <3.0 (*)     All other components within normal limits   VENOUS BLOOD GAS - Abnormal; Notable for the following components:    Venous pH 7.46 (*)     Venous pO2  78 (*)     Venous O2Hb 94.9 (*)     All other components within normal limits   CK CREATINE KINASE (NOT CREATININE) - Abnormal; Notable for the following components:     (*)     All other components within normal limits   ABG PANEL W ELECT AND LACTATE - Abnormal; Notable for the following components:    ABG pH 7.33 (*)     ABG pO2 131 (*)     ABG HCO3 20.1 (*)     ABG Base Excess -6.2 (*)     Sodium Blood Gas 129 (*)     Lactic Acid (Blood Gas) 3.5 (*)     All other components within normal limits   POCT GLUCOSE - Abnormal; Notable for the following components:    POC Glucose 178 (*)     All other components within normal limits   PROTHROMBIN TIME (PT) - Normal   PTT, ACTIVATED - Normal   ETHYL ALCOHOL - Normal   SCAN SLIDE   CBC, PLATELET; NO DIFFERENTIAL   COMP METABOLIC PANEL (14)   MAGNESIUM   PHOSPHORUS   TYPE AND SCREEN    Narrative:     The following orders were created for panel order Type and screen.  Procedure                               Abnormality         Status                     ---------                               -----------         ------                     ABORH (Blood Type)[[id number]]                               Final result               Antibody Screen[[id number]]                                  Final result                 Please view results for these tests on the individual orders.   ABORH (BLOOD TYPE)   ANTIBODY SCREEN   PREPARE RBC   ABORH CONFIRMATION   PREPARE RBC   SURGICAL PATHOLOGY TISSUE   RAINBOW DRAW LAVENDER   RAINBOW DRAW LIGHT GREEN   RAINBOW DRAW BLUE   RAINBOW DRAW GOLD   ED/MRSA SCREEN BY PCR-CC             2 large-bore IVs were inserted.  IV fluids were administered via warmer.  Patient was treated with a tetanus immunization, intravenous Mefoxin and analgesics.  He was kept NPO.    XR CHEST AP PORTABLE  (CPT=71045)    Result Date: 8/14/2024  PROCEDURE:  XR CHEST AP PORTABLE  (CPT=71045)  TECHNIQUE:  AP chest radiograph was obtained.  COMPARISON:  None.   INDICATIONS:  trauma 1  PATIENT STATED HISTORY: (As transcribed by Technologist)  Patient offered no additional history at this time.               CONCLUSION:   Normal cardiac and mediastinal contours.  No pulmonary edema or focal airspace consolidation.  The pleural spaces are clear.  No acute osseous findings.  Prominent gaseous distention of upper abdominal bowel loops.   [place]  [place]      Dictated by (CST): Ahsan Piedra MD on 8/14/2024 at 11:38 AM     Finalized by (CST): Ahsan Piedra MD on 8/14/2024 at 11:39 AM       I personally reviewed the images myself and went over results with patient.    I viewed the chest x-ray films myself and there is no cardiopulmonary abnormality.    Patient was taken directly to surgery by Dr. Mehul Romero.  [place] hospitalist as well as [facility] orthopedics Dr. Jacobson were contacted.    Patient was advised of the treatment plan.'s electronic certificate was placed on the chart.       MDM      #1.  Penetrating abdominal trauma.  Patient has dehisced.  Went to surgery.    2.  Left arm laceration.  Multiple tendons involved.  Ortho consulted.  #3.  Wound dehiscence.  4.  Suicidal ideations.                                 Medical Decision Making      Disposition and Plan     Clinical Impression:  1. Penetrating abdominal trauma, initial encounter    2. Arm laceration, left, initial encounter    3. Perioperative dehiscence of abdominal wound with evisceration    4. Suicidal ideation         Disposition:  Send to or/cath/gi  8/14/2024 11:30 am    Follow-up:  No follow-up provider specified.        Medications Prescribed:  Current Discharge Medication List

## 2024-08-14 NOTE — ANESTHESIA POSTPROCEDURE EVALUATION
[facility]    Kirby Conteh Patient Status:  Inpatient   Age/Gender 59 year old male MRN [id number]   Location [facility] 4SW-A Attending Nikkie Chavez MD   Hosp Day # 0 PCP SALVATORE HILL DO       Anesthesia Post-op Note    EXPLORATORY LAPAROTOMY WITH EVACUATION OF OF INTRA-ABDOMINAL HEMATOMA, SMALL BOWEL RESECTION, REPAIR OF ENTEROTOMY    Procedure Summary       Date: 08/14/24 Room / Location:  MAIN OR 04 /  MAIN OR    Anesthesia Start: 1152 Anesthesia Stop: 1456    Procedure: EXPLORATORY LAPAROTOMY WITH EVACUATION OF OF INTRA-ABDOMINAL HEMATOMA, SMALL BOWEL RESECTION, REPAIR OF ENTEROTOMY (Abdomen) Diagnosis:       Perioperative dehiscence of abdominal wound with evisceration      (Perioperative dehiscence of abdominal wound with evisceration [T81.31XA])    Surgeons: Nikkie Chavez MD Anesthesiologist: Omar Uriostegui MD    Anesthesia Type: general ASA Status: 2 - Emergent            Anesthesia Type: No value filed.    Vitals Value Taken Time   /84 08/14/24 1502   Temp 98 08/14/24 1502   Pulse 83 08/14/24 1501   Resp 21 08/14/24 1501   SpO2 95 % 08/14/24 1501   Vitals shown include unfiled device data.    Patient Location: ICU    Anesthesia Type: general    Airway Patency: intubated    Postop Pain Control: sedated until time of extubation    Mental Status: sedated until time of extubation    Nausea/Vomiting: none    Cardiopulmonary/Hydration status: stable euvolemic    Complications: no apparent anesthesia related complications    Postop vital signs: stable    Dental Exam: Unchanged from Preop    Sign out given to ICU staff.
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
## 2024-08-15 PROBLEM — F41.9 ANXIETY DISORDER: Status: ACTIVE | Noted: 2024-08-15

## 2024-08-15 PROBLEM — F33.2 MAJOR DEPRESSIVE DISORDER, RECURRENT SEVERE WITHOUT PSYCHOTIC FEATURES (HCC): Status: ACTIVE | Noted: 2024-08-15

## 2024-08-15 PROBLEM — G47.00 INSOMNIA: Status: ACTIVE | Noted: 2024-08-15

## 2024-08-15 LAB
ALBUMIN SERPL-MCNC: 3.5 G/DL (ref 3.2–4.8)
ALBUMIN/GLOB SERPL: 1.8 {RATIO} (ref 1–2)
ALP LIVER SERPL-CCNC: 45 U/L
ALT SERPL-CCNC: 35 U/L
ANION GAP SERPL CALC-SCNC: 6 MMOL/L (ref 0–18)
AST SERPL-CCNC: 32 U/L (ref ?–34)
BASOPHILS # BLD AUTO: 0.02 X10(3) UL (ref 0–0.2)
BASOPHILS NFR BLD AUTO: 0.1 %
BILIRUB SERPL-MCNC: 1.2 MG/DL (ref 0.3–1.2)
BLOOD TYPE BARCODE: 600
BLOOD TYPE BARCODE: 600
BUN BLD-MCNC: 23 MG/DL (ref 9–23)
CALCIUM BLD-MCNC: 8.5 MG/DL (ref 8.7–10.4)
CHLORIDE SERPL-SCNC: 106 MMOL/L (ref 98–112)
CO2 SERPL-SCNC: 24 MMOL/L (ref 21–32)
CREAT BLD-MCNC: 1.23 MG/DL
EGFRCR SERPLBLD CKD-EPI 2021: 68 ML/MIN/1.73M2 (ref 60–?)
EOSINOPHIL # BLD AUTO: 0 X10(3) UL (ref 0–0.7)
EOSINOPHIL NFR BLD AUTO: 0 %
ERYTHROCYTE [DISTWIDTH] IN BLOOD BY AUTOMATED COUNT: 13.1 %
EST. AVERAGE GLUCOSE BLD GHB EST-MCNC: 100 MG/DL (ref 68–126)
GLOBULIN PLAS-MCNC: 1.9 G/DL (ref 2–3.5)
GLUCOSE BLD-MCNC: 124 MG/DL (ref 70–99)
HBA1C MFR BLD: 5.1 % (ref ?–5.7)
HCT VFR BLD AUTO: 38.2 %
HGB BLD-MCNC: 13.4 G/DL
IMM GRANULOCYTES # BLD AUTO: 0.05 X10(3) UL (ref 0–1)
IMM GRANULOCYTES NFR BLD: 0.3 %
LYMPHOCYTES # BLD AUTO: 1.84 X10(3) UL (ref 1–4)
LYMPHOCYTES NFR BLD AUTO: 12.7 %
MAGNESIUM SERPL-MCNC: 2.4 MG/DL (ref 1.6–2.6)
MCH RBC QN AUTO: 31.2 PG (ref 26–34)
MCHC RBC AUTO-ENTMCNC: 35.1 G/DL (ref 31–37)
MCV RBC AUTO: 88.8 FL
MONOCYTES # BLD AUTO: 1.77 X10(3) UL (ref 0.1–1)
MONOCYTES NFR BLD AUTO: 12.2 %
NEUTROPHILS # BLD AUTO: 10.78 X10 (3) UL (ref 1.5–7.7)
NEUTROPHILS # BLD AUTO: 10.78 X10(3) UL (ref 1.5–7.7)
NEUTROPHILS NFR BLD AUTO: 74.7 %
OSMOLALITY SERPL CALC.SUM OF ELEC: 287 MOSM/KG (ref 275–295)
PLATELET # BLD AUTO: 270 10(3)UL (ref 150–450)
POTASSIUM SERPL-SCNC: 4.6 MMOL/L (ref 3.5–5.1)
PROT SERPL-MCNC: 5.4 G/DL (ref 5.7–8.2)
RBC # BLD AUTO: 4.3 X10(6)UL
SODIUM SERPL-SCNC: 136 MMOL/L (ref 136–145)
UNIT VOLUME: 350 ML
UNIT VOLUME: 350 ML
WBC # BLD AUTO: 14.5 X10(3) UL (ref 4–11)

## 2024-08-15 PROCEDURE — 3079F DIAST BP 80-89 MM HG: CPT | Performed by: INTERNAL MEDICINE

## 2024-08-15 PROCEDURE — 99233 SBSQ HOSP IP/OBS HIGH 50: CPT | Performed by: INTERNAL MEDICINE

## 2024-08-15 PROCEDURE — 3077F SYST BP >= 140 MM HG: CPT | Performed by: INTERNAL MEDICINE

## 2024-08-15 PROCEDURE — 3008F BODY MASS INDEX DOCD: CPT | Performed by: INTERNAL MEDICINE

## 2024-08-15 PROCEDURE — 99232 SBSQ HOSP IP/OBS MODERATE 35: CPT | Performed by: OTHER

## 2024-08-15 RX ORDER — LORAZEPAM 2 MG/ML
1 INJECTION INTRAMUSCULAR 2 TIMES DAILY PRN
Status: DISCONTINUED | OUTPATIENT
Start: 2024-08-15 | End: 2024-08-19

## 2024-08-15 RX ORDER — BUPROPION HYDROCHLORIDE 150 MG/1
150 TABLET ORAL DAILY
Status: DISCONTINUED | OUTPATIENT
Start: 2024-08-15 | End: 2024-08-21

## 2024-08-15 RX ORDER — OXYCODONE HYDROCHLORIDE 5 MG/1
5 TABLET ORAL EVERY 4 HOURS PRN
Status: DISCONTINUED | OUTPATIENT
Start: 2024-08-15 | End: 2024-08-21

## 2024-08-15 RX ORDER — KETOROLAC TROMETHAMINE 15 MG/ML
15 INJECTION, SOLUTION INTRAMUSCULAR; INTRAVENOUS EVERY 6 HOURS
Status: COMPLETED | OUTPATIENT
Start: 2024-08-15 | End: 2024-08-17

## 2024-08-15 RX ORDER — LORAZEPAM 1 MG/1
1 TABLET ORAL 2 TIMES DAILY PRN
Status: DISCONTINUED | OUTPATIENT
Start: 2024-08-15 | End: 2024-08-19

## 2024-08-15 RX ORDER — ACETAMINOPHEN 10 MG/ML
1000 INJECTION, SOLUTION INTRAVENOUS EVERY 8 HOURS
Status: DISPENSED | OUTPATIENT
Start: 2024-08-15 | End: 2024-08-16

## 2024-08-15 RX ORDER — TEMAZEPAM 15 MG/1
15 CAPSULE ORAL NIGHTLY
Status: DISCONTINUED | OUTPATIENT
Start: 2024-08-15 | End: 2024-08-19
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
## 2024-08-15 NOTE — PROGRESS NOTES
[facility]  Report of Psychiatric Progress Note    Kirby Conteh Patient Status:  Inpatient    1965 MRN [id number]   Location [facility] 4SW-A Attending Nikkie Chavez MD   Hosp Day # 1 PCP SALVATORE HILL DO     Date of Admission: 2024  Date of Service: 8/15/2024  Reason for Consultation: Suicide attempt    Impression:  Psychiatric Diagnoses:  Suicide attempt by cutting his left wrist and stabbing himself in the abdomen on 24. He was just hospitalized at [facility] psych unit in [place] from 24 to 24. Contributing factors for suicide attempt include: 1) recent medical issues (basal cell skin CA, ear infection; concern for a neurologic condition ie pinched nerve causing arm and leg numbness/tingling) increasing his anxiety 2) severe insomnia and 3) feeling ineffective at work because he can't concentrate and focus.     Major depressive disorder, recurrent, severe, without psychotic features. He has no delusions or hallucinations at this time.     Anxiety disorder unspecified. Rule out NOEMI. Rule out illness anxiety disorder.     Insomnia unspecified, likely related to depression.      Rule Out Diagnoses:  Bipolar 2 depression.     Personality Diagnosis:  OCPD traits.     Pertinent Medical Diagnoses:  Self-inflicted abdominal wound s/p ex-lap and evacuation of intra-abdominal hematoma, small bowel resection, and repair of enterotomy on 24.    Recommendations:  1) Restart Wellbutrin XL 150mg po daily for depression. This was started in the psych unit on 24.    2) Restart Zoloft 50mg po daily to help with anxiety and depression. He took it for a few days before he was admitted to the psych unit last week. Discussed using both Zoloft and Wellbutrin to treat his severe depression at this time and he is in agreement.     3) Restart Restoril 15mg po nightly to help with insomnia. He slept at least 5 hrs nightly with the Restoril.     4) Change Ativan PRN to 1mg po or  IV twice a day PRN anxiety.    5) Continue suicidal precautions and 1:1 sitter. He will be transferred to the psych unit when medically clear.     Enrique Weldon MD    History of Present Illness:  58 yo male with recurrent major depression and hx anxiety was admitted on 8/14/24 after attempting suicide by cutting his left wrist and stabbing himself in the abdomen on 8/13/24.     Per hx from sister, he has a hx of recurrent depression that started in his 20's. She recalls him endorsing sadness, low motivation, apathy, and a lack of enjoyment in life during these episodes. He was very depressed after his divorce 15 yrs ago but was still able to function and work. She is not aware of him having suicidal ideation until the past month.     About 1 month ago, he called her complaining of many neurologic symptoms like numbness and tingling and his neck and arms and legs. He c/o headache and sinus fullness and was diagnosed with an ear infection. 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 He was very anxious about having a physical illness and c/o insomnia. He endorsed \"mental deterioration\" and feeling depressed and helpless and hopeless. She encouraged him to seek help. He saw his primary MD 2 wks ago and was prescribed Temazepam at 7.5mg nightly which did not help with sleep or anxiety. He was prescribed Zoloft which she doesn't think he started (note- he did take it for a few days per his report). Due to his worsening anxiety and depression, she had him visit her in [facility]. During his visit with her, he admitted to having suicidal ideation. She brought him to the local ED where he was certified for inpatient psych treatment. There were no psych beds in [facility] so he was transferred to [facility] psych unit in [place] on 8/5/2024. He was treated with Wellbutrin XL 150mg daily for depression and Xanax 0.25mg QID titrated up to 0.5mg QID for anxiety and Restoril 15mg nightly for insomnia. He  would call her and appeared paranoid, talking about how he felt locked up in the psych unit. He no longer had suicidal ideation and was discharged home on 8/13/24. Sister's  had to have a medical procedure, so she was unable to pick him up and drive him home. The psych unit set up a cab for him to go home.     Today, sister visited him and found him in the bathtub with the lacerated wrist and the self-inflicted abdominal wound. She called 911 immediately and he was brought to the ED. He went to the OR today and is s/p ex-lap and evacuation of intra-abdominal hematoma, small bowel resection, and repair of enterotomy. He remains intubated.  Sister is not aware of any specific triggers or new stressors contributing to the suicide attempt.    Interval Hx:  8/15/24- He shares how he has felt depressed the past 2 months. Initially, he developed physical symptoms including numbness/tingling of his neck and right arm and bilateral legs. This scared him. He also c/o sinus fullness and hearing a pulsating sound. He was diagnosed with an ear infection and treated with antibiotics. He also had a skin exam that revealed basal cell cancer. He needs to have it excised. He had xrays of his neck and back and was told he had some arthritis. He was scheduled to get a C spine MRI.     At baseline, he describes a hx of generalized anxiety. His anxiety increased with the medical issues. He was ruminating and worrying about having a serious illness. He began having troubling sleeping, both falling and staying asleep. This caused him to feel more tired and have trouble concentrating. He felt ineffective at work. He felt more depressed and overwhelmed and helpless and hopeless.     He began having passive suicidal thoughts about 1 month ago. He shared this with his sister on 8/4/24 and was admitted to the psych unit on 8/5/25 as mentioned above. He didn't like it there. He didn't get much from the group therapy. He thought many of  the patients were there because they were homeless and needed a place to stay. He only met with the  once during the 8 days and really wanted individual psychotherapy. The psychiatrist started Restoril/Xanax/Wellbutrin which did help with sleep. He still felt anxious and depressed and helpless and hopeless. He wanted to die to escape the dread and anxiety of living. He told staff that he was no longer suicidal so he could leave. He had a plan to discharge and overdose on the Restoril but they didn't give him any. He thought about overdosing on Ibuprofen or Aspirin. He looked it up on the internet and realized that it doesn't work. So he impulsively took a knife and cut his wrist and stabbed himself in the abdomen to kill himself. The pain stopped him from cutting deeper. He denied using any substances. He laid in the bathtub and waited to bleed out and die. He felt sad about leaving his 15 yr old daughter, but \"relieved\" at the thought of not having to suffer anymore.     When asked how he feels about being alive now, he is ambivalent. He doesn't want to leave his daughter but he doesn't want to be alive. He has passive suicidal ideation at this time.    Past Psychiatric History:  1) Major depressive disorder, recurrent. He had his first depressive episode in his 20's. About 15 yrs ago, he had an episode when he was going through a divorce. Hx of suicidal ideation but no known attempts per hx from sister.     2) Anxiety unspecified.     Substance Use History: None per sister.    Psych Family History: Mother with life long hx of depression. One psychiatrist thought his mother had Bipolar 2 disorder.     Social and Developmental History:  with a 15 yr old daughter. 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 Patient's sister Miguelina [phone] is his main support.     Past Medical History:    Anxiety    Depression     History reviewed. No pertinent surgical history.  History  reviewed. No pertinent family history.   reports that he has never smoked. He has never used smokeless tobacco. He reports current alcohol use. He reports that he does not use drugs.    Allergies:  No Known Allergies    Medications:    Current Facility-Administered Medications:     acetaminophen (Ofirmev) 10 mg/mL infusion premix 1,000 mg, 1,000 mg, Intravenous, Q8H    ketorolac (Toradol) 15 MG/ML injection 15 mg, 15 mg, Intravenous, q6h    oxyCODONE immediate release tab 5 mg, 5 mg, Oral, Q4H PRN    pantoprazole (Protonix) 40 mg in sodium chloride 0.9% PF 10 mL IV push, 40 mg, Intravenous, Q24H    sertraline (Zoloft) tab 50 mg, 50 mg, Oral, Daily    buPROPion ER (Wellbutrin XL) 24 hr tab 150 mg, 150 mg, Oral, Daily    temazepam (Restoril) cap 15 mg, 15 mg, Oral, Nightly    LORazepam (Ativan) tab 1 mg, 1 mg, Oral, BID PRN **OR** LORazepam (Ativan) 2 mg/mL injection 1 mg, 1 mg, Intravenous, BID PRN    piperacillin-tazobactam (Zosyn) 3.375 g in dextrose 5% 100 mL IVPB-ADDV, 3.375 g, Intravenous, q12h    sodium chloride 0.9% infusion, , Intravenous, Continuous    heparin (Porcine) 5000 UNIT/ML injection 5,000 Units, 5,000 Units, Subcutaneous, Q8H ROLY    HYDROmorphone (Dilaudid) 1 MG/ML injection 0.2 mg, 0.2 mg, Intravenous, Q2H PRN **OR** HYDROmorphone (Dilaudid) 1 MG/ML injection 0.4 mg, 0.4 mg, Intravenous, Q2H PRN **OR** HYDROmorphone (Dilaudid) 1 MG/ML injection 0.8 mg, 0.8 mg, Intravenous, Q2H PRN    ondansetron (Zofran) 4 MG/2ML injection 4 mg, 4 mg, Intravenous, Q6H PRN    prochlorperazine (Compazine) 10 MG/2ML injection 5 mg, 5 mg, Intravenous, Q8H PRN    Review of Systems   Psychiatric/Behavioral:  Positive for depression and suicidal ideas. Negative for hallucinations and substance abuse. The patient is nervous/anxious and has insomnia.      Mental Status Exam:     Objective      Vitals:    08/15/24 1400   BP: 127/71   Pulse: 72   Resp: 25   Temp:      Appearance: fair grooming, extubated  Behavior:  cooperative  Gait: N/A    Speech: fluent    Mood: anxious and depressed  Affect: congruent    Thought process: linear  Thought content: no hallucinations or delusions    Orientation: self, hospital, month, year, situation  Attention and Concentration: fair  Memory: intact remote and recent  Language: able to name and repeat  Fund of Knowledge: able to recite name of current [place] president    Insight: limited  Judgment: POOR regarding suicide attempt, fair in accepting mental health treatment at this time    Laboratory Data:  Lab Results   Component Value Date    WBC 14.5 08/15/2024    HGB 13.4 08/15/2024    HCT 38.2 08/15/2024    .0 08/15/2024    CREATSERUM 1.23 08/15/2024    BUN 23 08/15/2024     08/15/2024    K 4.6 08/15/2024     08/15/2024    CO2 24.0 08/15/2024     08/15/2024    CA 8.5 08/15/2024    ALB 3.5 08/15/2024    ALKPHO 45 08/15/2024    BILT 1.2 08/15/2024    TP 5.4 08/15/2024    AST 32 08/15/2024    ALT 35 08/15/2024    MG 2.4 08/15/2024    PHOS 2.6 08/14/2024

## 2024-08-15 NOTE — H&P
[facility]  Report of Consultation    Kirby Conteh Patient Status:  Inpatient    1965 MRN [id number]   Location [facility] 3NW-A Attending Nikkie Chavez MD   Hosp Day # 1 PCP SALVATORE HILL,      Date of service:      2024    Requesting Physician: Dr. Boshc-ED    Reason for Consultation:  Multiple self-inflicted stab wound    History of Present Illness:  Kirby Conteh is a a(n) 59 year old male    59-year-old gentleman who presents to the emergency department after suicide attempt.  The patient was found lying in the bathtub by his sister Miguelina earlier today.  The patient apparently had a self-inflicted laceration to the left wrist and multiple self-inflicted abdominal stab wounds.  The patient does have eviscerated small bowel.  Level 1 trauma was called.  On examination, the patient is alert and oriented.  He is answering questions appropriately..  He does have obvious small bowel eviscerated from the abdominal.  There is a deep laceration to the right wrist.    History:  Past Medical History:    Anxiety    Depression     History reviewed. No pertinent surgical history.  History reviewed. No pertinent family history.   reports that he has never smoked. He has never used smokeless tobacco. He reports current alcohol use. He reports that he does not use drugs.    Allergies:  No Known Allergies    Medications:    Current Facility-Administered Medications:     acetaminophen (Ofirmev) 10 mg/mL infusion premix 1,000 mg, 1,000 mg, Intravenous, Q8H    ketorolac (Toradol) 15 MG/ML injection 15 mg, 15 mg, Intravenous, q6h    oxyCODONE immediate release tab 5 mg, 5 mg, Oral, Q4H PRN    pantoprazole (Protonix) 40 mg in sodium chloride 0.9% PF 10 mL IV push, 40 mg, Intravenous, Q24H    sertraline (Zoloft) tab 50 mg, 50 mg, Oral, Daily    buPROPion ER (Wellbutrin XL) 24 hr tab 150 mg, 150 mg, Oral, Daily    temazepam (Restoril) cap 15 mg, 15 mg, Oral, Nightly    LORazepam (Ativan) tab  1 mg, 1 mg, Oral, BID PRN **OR** LORazepam (Ativan) 2 mg/mL injection 1 mg, 1 mg, Intravenous, BID PRN    piperacillin-tazobactam (Zosyn) 3.375 g in dextrose 5% 100 mL IVPB-ADDV, 3.375 g, Intravenous, q12h    sodium chloride 0.9% infusion, , Intravenous, Continuous    heparin (Porcine) 5000 UNIT/ML injection 5,000 Units, 5,000 Units, Subcutaneous, Q8H ROLY    HYDROmorphone (Dilaudid) 1 MG/ML injection 0.2 mg, 0.2 mg, Intravenous, Q2H PRN **OR** HYDROmorphone (Dilaudid) 1 MG/ML injection 0.4 mg, 0.4 mg, Intravenous, Q2H PRN **OR** HYDROmorphone (Dilaudid) 1 MG/ML injection 0.8 mg, 0.8 mg, Intravenous, Q2H PRN    ondansetron (Zofran) 4 MG/2ML injection 4 mg, 4 mg, Intravenous, Q6H PRN    prochlorperazine (Compazine) 10 MG/2ML injection 5 mg, 5 mg, Intravenous, Q8H PRN    Review of Systems:    Allergic/Immuno:  Review of patient's allergies performed.  Constitutional:  Negative for decreased activity, n fever, irritability and lethargy, n unintentional weight loss  Endocrine:  Negative for abnormal sleep patterns, increased activity, polydipsia and polyphagia  HEENT:  Negative for hearing changes,  Nasal discharge  Eyes:  Negative for eye discharge, visual disturbance   Cardiovascular:  Negative for cool extremity and irregular heartbeat/palpitations  Respiratory:  Negative for cough, dyspnea and wheezing, SOB  Gastrointestinal:  y  abdominal pain,  nanorexia, nnausea, nemesis, ndiarrhea, nconstipation, nhematochezia  Genitourinary:  Negative for dysuria and hematuria  Hema/Lymph:  Negative for easy bleeding and easy bruising  Integumentary:  Negative for pruritus and rash, changing pigmented moles, lesions  Musculoskeletal:  Negative for bone/joint symptoms, negative for muscle aches or cramping  Neurological:  Negative for gait disturbance, headaches  Psychiatric:  Negative for inappropriate interaction and psychiatric symptoms      Physical Exam:  Blood pressure 131/82, pulse 66, temperature 98.4 °F (36.9 °C),  temperature source Oral, resp. rate 25, height 1.803 m (5' 11\"), weight 76.2 kg (168 lb), SpO2 96%.    General:WDWN, in no acute distress   HEENT: Exam is unremarkable.  Without scleral icterus.  Mucous membranes are moist.  Oropharynx is clear.  Neck:  No JVD. Supple.   Lungs: Clear to auscultation bilaterally.  Cardiac: Regular rate and rhythm. No murmur.  Abdomen: 3 superficial stab wounds in the upper abdomen.  2 large lacerations measuring approximately 5 cm each with eviscerated small bowel located in the left upper quadrant and right paramedian.  The small bowel appears to be viable.  There is no active bleeding.  No peritoneal signs.     Extremities: Laceration to left wrist is noted.  There is no active bleeding.  There are visible tendons.  No lower extremity edema noted.  Without clubbing or cyanosis.    Skin: Normal texture and turgor.  Deep laceration across left wrist.  Visible tendons and likely tendon injury.  No active bleeding is noted.  Radial pulses present.  Fingers are warm to touch with good capillary refill.  Patient is able to move all 5 digits without difficulty  Neurologic: Cranial nerves are grossly intact.  Alert and oriented x 3.  No focal neurologic deficit.    Laboratory Data:  Recent Labs   Lab 08/15/24  0443   RBC 4.30   HGB 13.4   HCT 38.2*   MCV 88.8   MCH 31.2   MCHC 35.1   RDW 13.1   NEPRELIM 10.78*   WBC 14.5*   .0     Recent Labs   Lab 08/14/24  1119 08/14/24  2037 08/15/24  0443   * 159* 124*   BUN 35* 26* 23   CREATSERUM 3.39* 1.72* 1.23   CA 10.3 8.4* 8.5*   ALB 4.6 3.6 3.5   * 135* 136   K 4.1 4.2 4.6   CL 98 107 106   CO2 23.0 20.0* 24.0   ALKPHO 61 45 45   AST 30 38* 32   ALT 36 36 35   BILT 1.5* 2.1* 1.2   TP 7.0 5.6* 5.4*         Assessment/Plan:     59-year-old gentleman status post self-inflicted stab wound to the left wrist and abdomen.  On physical examination the patient does have eviscerated small bowel.  He is hemodynamically stable.  The  patient will be taken to the operating room emergently for exploratory laparotomy, possible bowel resection, repair of any incidental injuries noted.  Hand surgery will also be consulted for laceration left wrist.  This was discussed with Zia in detail.  He has no further questions at this time and will proceed with surgery as discussed        Discussed:   The potential treatment options were discussed with the patient.  The potential risks, benefits, outcomes/recovery and alternatives to any proposed surgery were also fully reviewed with the patient. Any proposed surgical procedure was described in detail.  The patient verbalizes understanding.  All questions from the patient were discussed in detail to the patient's satisfaction.  No other questions were presented at this time.  Incidental abnormalities found on serology or imaging will be addressed by the patient's PCP or other services as appropriate.    Thank you,   Nikkie Chavez MD      Please note that this report has been produced using speech recognition software and may contain errors related to that system including but not limited to errors in grammar, punctuation and spelling as well as words and phrases that possibly may have been recognized inappropriately.  If there are any questions or concerns please contact the dictating provider for clarification.  The 21st Century Cures Act makes medical notes like these available to patients in the interest of trans parency. Please be advised this is a medical document. Medical documents are intended to carry relevant information, facts as evident, and the clinical opinion of the practitioner. The medical note is intended as peer to peer communication and may appear blunt or direct. It is written in medical language and may contain abbreviations or verbiage that are unfamiliar.  If there are any questions or concerns please contact the dictating provider for clarification.

## 2024-08-15 NOTE — PROGRESS NOTES
Kirby Conteh Patient Status:  Inpatient    1965 MRN [id number]   [facility] 4SW-A Attending Nikkie Chavez MD   Hosp Day # 1 PCP SALVATORE HILL,      Critical Care Progress Note          Subjective:  No acute events overnight. Awaiting ortho eval for numbness of fingers in left hand.    Objective:    Medications:   acetaminophen  1,000 mg Intravenous Q8H    ketorolac  15 mg Intravenous q6h    pantoprazole  40 mg Intravenous Q24H    heparin  5,000 Units Subcutaneous Q8H ROLY    piperacillin-tazobactam  4.5 g Intravenous q12h       Vent Mode: PS  FiO2 (%):  [40 %-100 %] 40 %  S RR:  [16] 16  S VT:  [450 mL] 450 mL  PEEP/CPAP (cm H2O):  [5 cm H20] 5 cm H20  MAP (cm H2O):  [7.6-7.8] 7.8      Intake/Output Summary (Last 24 hours) at 8/15/2024 1112  Last data filed at 8/15/2024 0800  Gross per 24 hour   Intake 6350.3 ml   Output 1925 ml   Net 4425.3 ml       /72   Pulse 70   Temp 99.2 °F (37.3 °C) (Temporal)   Resp 24   Ht 180.3 cm (5' 11\")   Wt 168 lb (76.2 kg)   SpO2 93%   BMI 23.43 kg/m²     Physical Exam:    General Appearance: Alert, cooperative, no distress, appears stated age  Neck: No JVD, neck supple, no adenopathy, trachea midline, no carotid bruits  Lungs: Clear to auscultation bilaterally, respirations unlabored  Heart: Regular rate and rhythm, S1 and S2 normal, no murmur, rub or gallop  Abdomen: Soft, non-tender, bowel sounds active all four quadrants, no masses, no organomegaly, surgical dressing C/D/I  Extremities: Extremities normal, atraumatic, no cyanosis or edema,capillary refill <3 sec.    Pulses: 2+ and symmetric all extremities  Skin: Skin color, texture, turgor normal for ethnicity, no rashes or lesions, warm and dry    Recent Labs   Lab 08/15/24  0443   RBC 4.30   HGB 13.4   HCT 38.2*   MCV 88.8   MCH 31.2   MCHC 35.1   RDW 13.1   NEPRELIM 10.78*   WBC 14.5*   .0     Recent Labs   Lab 24  1119 24  2037 08/15/24  0443   * 159*  124*   BUN 35* 26* 23   CREATSERUM 3.39* 1.72* 1.23   CA 10.3 8.4* 8.5*   ALB 4.6 3.6 3.5   * 135* 136   K 4.1 4.2 4.6   CL 98 107 106   CO2 23.0 20.0* 24.0   ALKPHO 61 45 45   AST 30 38* 32   ALT 36 36 35   BILT 1.5* 2.1* 1.2   TP 7.0 5.6* 5.4*     Recent Labs   Lab 08/14/24  1542   ABGPHT 7.33*   PEDBJE2T 36   WPNEJ1R 131*   ABGHCO3 20.1*   ABGBE -6.2*   TEMP 98.6   NÉSTOR Positive   SITE Right Radial   DEV    THGB 15.0     No results for input(s): \"BNP\" in the last 168 hours.  Recent Labs   Lab 08/14/24  1119   *       XR CHEST AP PORTABLE  (CPT=71045)    Result Date: 8/14/2024  CONCLUSION:   1.  Endotracheal tube is 7 cm above the ari at the thoracic inlet.  2. No acute airspace disease.   LOCATION:  [facility]      Dictated by (CST): Scottie Menendez MD on 8/14/2024 at 4:00 PM     Finalized by (CST): Scottie Menendez MD on 8/14/2024 at 4:00 PM       XR CHEST AP PORTABLE  (CPT=71045)    Result Date: 8/14/2024  CONCLUSION:   Normal cardiac and mediastinal contours.  No pulmonary edema or focal airspace consolidation.  The pleural spaces are clear.  No acute osseous findings.  Prominent gaseous distention of upper abdominal bowel loops.   LOCATION:  [facility]      Dictated by (CST): Ahsan Piedra MD on 8/14/2024 at 11:38 AM     Finalized by (CST): Ahsan Piedra MD on 8/14/2024 at 11:39 AM           Assessment/Plan:    Penetrating abdominal trauma 2/2 to self inflicted POD #1 s/p ex lap with evacuation of intra-abdominal hematoma, small bowel resection, repair of enterotomy  - Post op care per surgery  - NGT to LIS, NPO, ok for meds, sips of clears  - Pain management  - IVF  - Discontinue davis catheter  - Continue Zosyn (8/14-)     Left arm laceration  -Ortho on consult    Suicidal ideation  Depression  - Psych following  - 1:1 Sitter  - Suicide precautions    Hyperglycemia  - Check A1c     F/E/N  -NPO  -IVF  -replete electrolytes as needed     Proph  -subcutaneous heparin. ppi  -SCDs  - PT/OT     Dispo  - full  code  -ICU monitoring        Plan of care discussed with intensivist on-call, Dr. Osorio.      [facility]  Critical Care  [id number]      Pulmonary/Critical Care Physician Addendum     Kirby Conteh was interviewed and examined personally.  I reviewed and agree with the APN's documentation above of the patient's history, physical examination, test results, diagnoses, and plan of treatment.      Labs and imaging reviewed.     ASSESSMENT/PLAN  Extubated yesterday afternoon.  Hemodynamically stable.  F/u surgery recs and psych recs.   Hand surgery consulted to see.   Stable for transfer out of ICU. Pulmonary/CCM service will sign off upon physical transfer out of the ICU. Contact our service with questions/concerns.      Thank you for the opportunity to care for Kirby Conteh.      ROZ Osorio DO, MPH  Pulmonary Critical Care Medicine  [facility]

## 2024-08-15 NOTE — PROGRESS NOTES
[facility]  Progress Note    Kirby Conteh Patient Status:  Inpatient    1965 MRN [id number]   [facility] 4SW-A Attending Nikkie Chavez MD   Hosp Day # 1 PCP SALVATORE HILL, DO     Subjective:  The patient is doing well today.  He is resting comfortably in bed.  Sitter at the bedside.  He reports mild incisional pain.  He denies nausea or vomiting.  NG tube is in place.  He denies flatus.  He denies a bowel movement.  He denies fever or chills.  He denies chest pain or shortness of breath.  The patient states that he has having numbness of his first, second and fifth digits.  He denies weakness.    Objective/Physical Exam:  General: Alert, orientated x3.  Cooperative.  No apparent distress.  Vital Signs:  Blood pressure 123/80, pulse 73, temperature 99.2 °F (37.3 °C), temperature source Temporal, resp. rate (!) 29, height 71\", weight 168 lb (76.2 kg), SpO2 93%.  Wt Readings from Last 3 Encounters:   24 168 lb (76.2 kg)     Lungs: No respiratory distress.  Cardiac: Regular rate and rhythm.   Abdomen:  Soft, non distended, non tender, with no rebound or guarding.  No peritoneal signs.   Extremities:  No lower extremity edema noted.        Intake/Output:    Intake/Output Summary (Last 24 hours) at 8/15/2024 1007  Last data filed at 8/15/2024 0800  Gross per 24 hour   Intake 6350.3 ml   Output 1925 ml   Net 4425.3 ml     I/O last 3 completed shifts:  In: 6350.3 [I.V.:4850.3; Blood:1050; IV PIGGYBACK:450]  Out: 1725 [Urine:1575; Emesis/NG output:100; Blood:50]  I/O this shift:  In: -   Out: 200 [Urine:200]    Medications:    acetaminophen  1,000 mg Intravenous Q8H    ketorolac  15 mg Intravenous q6h    heparin  5,000 Units Subcutaneous Q8H ROLY    piperacillin-tazobactam  4.5 g Intravenous q12h       Labs:  Lab Results   Component Value Date    WBC 14.5 08/15/2024    HGB 13.4 08/15/2024    HCT 38.2 08/15/2024    .0 08/15/2024     Lab Results   Component Value Date      08/15/2024    K 4.6 08/15/2024     08/15/2024    CO2 24.0 08/15/2024    VHCO3  08/14/2024      Comment:      Out of range      BUN 23 08/15/2024    CREATSERUM 1.23 08/15/2024     08/15/2024    CA 8.5 08/15/2024    ALKPHO 45 08/15/2024    ALT 35 08/15/2024    AST 32 08/15/2024    BILT 1.2 08/15/2024    ALB 3.5 08/15/2024    TP 5.4 08/15/2024     Lab Results   Component Value Date    INR 1.15 08/14/2024         Assessment  Patient Active Problem List   Diagnosis    Penetrating abdominal trauma, initial encounter    Arm laceration, left, initial encounter     Postop day 1 exploratory laparotomy with evacuation of intra-abdominal hematoma, small bowel resection and repair of enterotomy  Suicide attempt      Plan:  Continue NG tube to low intermittent suction until return of bowel function.  NPO.  He may have ice chips and sips of clears.  Remove Fischer.  Continue IV antibiotics.  Ortho/hand consult pending  Okay to resume oral medication with NG tube clamp for 30 to 60 minutes after.  Suicide precautions as per psychiatry.  For transfer to psych unit after discharge.  Ambulate and up to chair  DVT prophylaxis with heparin  GI prophylaxis with Protonix    Quality:  DVT Mechanical Prophylaxis:   SCDs,    DVT Pharmacologic Prophylaxis   Medication    heparin (Porcine) 5000 UNIT/ML injection 5,000 Units                Code Status: Full Code  Fischer: Fischer catheter in place  Fischer Duration (in days): 2  Central line:    ALLI:         Christi Haynes PA-C  8/15/2024  10:07 AM    POD 1-intraoperative findings discussed with patient in detail.  He has no further questions at this time.  Will continue to await bowel function.  Continue NG tube.  Patient may have sips of clears.  Encourage ambulation.  One-on-one sitter in place.  Patient may transfer out of ICU if cleared by medical services  I agree with the note above and attest to its accuracy with the following changes below after my interview and examination of the  patient    The patient was seen and examined.  Available labs and radiology is noted.    Nikkie Chavez MD FACS    Please note that this report has been produced using speech recognition software and may contain errors related to that system including but not limited to errors in grammar, punctuation and spelling as well as words and phrases that possibly may have been recognized inappropriately.  If there are any questions or concerns please contact the dictating provider for clarification.    The 21st Century Cures Act makes medical notes like these available to patients in the interest of trans parency. Please be advised this is a medical document. Medical documents are intended to carry relevant information, facts as evident, and the clinical opinion of the practitioner. The medical note is intended as peer to peer communication and may appear blunt or direct. It is written in medical language and may contain abbreviations or verbiage that are unfamiliar.   Again if there are any questions or concerns please contact the dictating provider for clarification.

## 2024-08-15 NOTE — PLAN OF CARE
Assumed care of patient after shift report. Patient is Aox4. VSS on room air. NG to LIS. Patient denies passing gas yet. Fischer removed at 1400. Pain controlled with scheduled tylenol and toradol. Patient up in chair x1. Transfer orders to the floor.     Report called to KUSH Ruiz.

## 2024-08-15 NOTE — CM/SW NOTE
08/15/24 1500   CM/SW Referral Data   Referral Source Social Work (self-referral)   Reason for Referral Discharge planning   Informant EMR;Patient;Sibling   Medical Hx   Does patient have an established PCP? Yes  (, Bib Huff, DO)   Significant Past Medical/Mental Health Hx Anxiety and depression   Patient Info   Advanced directives? No   Patient's Current Mental Status at Time of Assessment Alert   Patient's Home Environment House   Patient lives with Alone   Patient Status Prior to Admission   Independent with ADLs and Mobility Yes   Discharge Needs   Anticipated D/C needs Other  (Plan for DC to inpatient psychiatric facility)     Met w/ pt, his sister Miguelina and her sps. Pt awake, alert, visiting w/ family. Pt's sister voiced concerns about discharge to inpatient psych, a pt had a bad experience with recent discharge from inpatient psych facility. Per pt and sister, they had little interaction with the facility social work team and there was a breakdown in communication around the plan for DC. Pt was sent home in a cab at [place]. Per pt's sister, facility wouldn't wait until she could come pick pt up in ~12 hrs.     D/w pt and family that discharge to inpatient psychiatric facility is coordinated by our psych team but that CM will remain available and can assist w/ communicating any progress. Pt will need to be nearing medical clearance before dc plan can be finalized. Pt/family voice understanding that placement will be dependent on acceptance and bed availability when pt is medically ready for DC.  Pt would prefer to stay close to his home in the [facility] for services.    Contact information provided for several team members in the CM team. Pt/family encouraged to reach out with any questions or concerns.    CM/SW will remain available for DC planning and/or support.     SONAM BurgessN, CMSRN    [id number]

## 2024-08-15 NOTE — PROGRESS NOTES
NURSING ADMISSION NOTE      Patient admitted via Cart  Oriented to room.  Safety precautions initiated.  Bed in low position.  Call light in reach.  Transferred from ICU to SCU

## 2024-08-15 NOTE — PAYOR COMM NOTE
--------------  ADMISSION REVIEW     Payor: [employer]  Subscriber #:  [id number]  Authorization Number: [id number]    Admit date: 8/14/24  Admit time:  2:46 PM       REVIEW DOCUMENTATION:     ED Provider Notes        ED Provider Notes signed by Josseline Bosch MD at 8/14/2024  4:58 PM       Author: Josseline Bosch MD Service: -- Author Type: Physician    Filed: 8/14/2024  4:58 PM Date of Service: 8/14/2024  4:53 PM Status: Signed    : Josseline Bosch MD (Physician)           Patient Seen in: [address]      History     Chief Complaint   Patient presents with    Trauma 1 & 2     Stated Complaint: trauma 1    Subjective:   HPI    89-year-old male with a history of depression attempted suicide at home by slashing his abdomen yesterday and lying in a bathtub.  He also cut his left arm.  He was found by his sister today who called paramedics who transported the patient to the emergency department.  Patient was hospitalized at [facility] for suicidal ideations up until yesterday morning when he was discharged to home.  Because of the nature of the injuries including penetrating trauma to the torso, level 1 trauma was called.    Objective:   Past Medical History:    Anxiety    Depression              Review of Systems    Positive for stated Chief Complaint: Trauma 1 & 2    Other systems are as noted in HPI.  Constitutional and vital signs reviewed.      All other systems reviewed and negative except as noted above.    Physical Exam     ED Triage Vitals   BP 08/14/24 1118 119/87   Pulse 08/14/24 1118 119   Resp 08/14/24 1118 26   Temp 08/14/24 1118 98.9 °F (37.2 °C)   Temp src 08/14/24 1118 Temporal   SpO2 08/14/24 1118 95 %   O2 Device 08/14/24 1116 None (Room air)       Current Vitals:   Vital Signs  BP: (!) 135/92  Pulse: 85  Resp: 22  Temp: 99.2 °F (37.3 °C)  Temp src: Temporal  MAP (mmHg): (!) 102    Oxygen Therapy  SpO2: 93 %  O2 Device: Nasal cannula  FiO2 (%): (S) 40 %  O2  Flow Rate (L/min): 3 L/min  Pulse Oximetry Type: Continuous  Oximetry Probe Site Changed: No  Pulse Ox Probe Location: Left hand          Physical Exam    General Appearance: This is a middle-aged male lying on a gurney.  Vital signs were reviewed per nurses notes.  Blood pressure is 135/92.  Monitor initially was 120 and a sinus tachycardia.  Temperature was 99.2.  Pulse oximetry 93% on room air.  HEENT: Normocephalic atraumatic.  Anicteric sclera.  Oral mucosa is moist.  Neck: No adenopathy or thyromegaly.  Lungs are clear breath sounds are equal.  Heart exam: Normal S1-S2 without extra sounds or murmurs.  Regular rate and rhythm.  Abdomen: There are 2 large lacerations across the upper abdomen in a horizontal fashion with evisceration.  No active bleeding was noted.  Extremities: Right upper extremity and both lower extremities are atraumatic.  Left upper extremity-there is a several centimeter long horizontal laceration on the palmar surface of the distal forearm with apparent tendon rupture.  Radial pulses present.  Skin is pale and dry.  Neuroexam: Alert and oriented x 4.  Speech is fluent.  Moving all 4 extremities well.    ED Course     Labs Reviewed   CBC WITH DIFFERENTIAL WITH PLATELET - Abnormal; Notable for the following components:       Result Value    WBC 24.4 (*)     Neutrophil Absolute Prelim 19.83 (*)     Neutrophil Absolute 19.83 (*)     Monocyte Absolute 2.05 (*)     All other components within normal limits   COMP METABOLIC PANEL (14) - Abnormal; Notable for the following components:    Glucose 164 (*)     Sodium 132 (*)     BUN 35 (*)     Creatinine 3.39 (*)     eGFR-Cr 20 (*)     Bilirubin, Total 1.5 (*)     All other components within normal limits   ACETAMINOPHEN (TYLENOL), S - Abnormal; Notable for the following components:    Acetaminophen 4.4 (*)     All other components within normal limits   SALICYLATE, SERUM - Abnormal; Notable for the following components:    Salicylate <3.0 (*)      All other components within normal limits   VENOUS BLOOD GAS - Abnormal; Notable for the following components:    Venous pH 7.46 (*)     Venous pO2 78 (*)     Venous O2Hb 94.9 (*)     All other components within normal limits   CK CREATINE KINASE (NOT CREATININE) - Abnormal; Notable for the following components:     (*)     All other components within normal limits   ABG PANEL W ELECT AND LACTATE - Abnormal; Notable for the following components:    ABG pH 7.33 (*)     ABG pO2 131 (*)     ABG HCO3 20.1 (*)     ABG Base Excess -6.2 (*)     Sodium Blood Gas 129 (*)     Lactic Acid (Blood Gas) 3.5 (*)     All other components within normal limits   POCT GLUCOSE - Abnormal; Notable for the following components:    POC Glucose 178 (*)          2 large-bore IVs were inserted.  IV fluids were administered via warmer.  Patient was treated with a tetanus immunization, intravenous Mefoxin and analgesics.  He was kept NPO.    XR CHEST AP PORTABLE  (CPT=71045)    Result Date: 8/14/2024  PROCEDURE:  XR CHEST AP PORTABLE  (CPT=71045)  TECHNIQUE:  AP chest radiograph was obtained.  COMPARISON:  None.  INDICATIONS:  trauma 1  PATIENT STATED HISTORY: (As transcribed by Technologist)  Patient offered no additional history at this time.               CONCLUSION:   Normal cardiac and mediastinal contours.  No pulmonary edema or focal airspace consolidation.  The pleural spaces are clear.  No acute osseous findings.  Prominent gaseous distention of upper abdominal bowel loops.   [place]  [place]      Dictated by (CST): Ahsan Piedra MD on 8/14/2024 at 11:38 AM     Finalized by (CST): Ahsan Piedra MD on 8/14/2024 at 11:39 AM       I personally reviewed the images myself and went over results with patient.    I viewed the chest x-ray films myself and there is no cardiopulmonary abnormality.    Patient was taken directly to surgery by Dr. Mehul Romero.  [facility] hospitalist as well as [facility] [place] Jacobson were contacted.    Patient  was advised of the treatment plan.'s electronic certificate was placed on the chart.      MDM      #1.  Penetrating abdominal trauma.  Patient has dehisced.  Went to surgery.    2.  Left arm laceration.  Multiple tendons involved.  Ortho consulted.  #3.  Wound dehiscence.  4.  Suicidal ideations.         Medical Decision Making      Disposition and Plan     Clinical Impression:  1. Penetrating abdominal trauma, initial encounter    2. Arm laceration, left, initial encounter    3. Perioperative dehiscence of abdominal wound with evisceration    4. Suicidal ideation         Disposition:  Send to or/cath/gi  8/14/2024 11:30 am        8/14 Anesthesia Procedure     Pre-op Diagnosis: Perioperative dehiscence of abdominal wound with evisceration [T81.31XA]     EXPLORATORY LAPAROTOMY, POSSIBLE BOWEL RESECTION, POSSIBLE CREATION OF OSTOMY     Anesthesia Procedure: EXPLORATORY LAPAROTOMY, POSSIBLE BOWEL RESECTION, POSSIBLE CREATION OF OSTOMY (Abdomen)     Surgeons and Role:     * Nikkie Chavez MD - Primary     Pre-op vitals reviewed.  Temp: 98.9 °F (37.2 °C)  Pulse: 78  Resp: 28  BP: 121/93  SpO2: 96 %  Body mass index is 23.43 kg/m².     Current medications reviewed.  Hospital Medications:  Current Medications    [COMPLETED] Tetanus-Diphth-Acell Pertussis (Tdap) (Boostrix) injection 0.5 mL  0.5 mL Intramuscular Once    sodium chloride 0.9 % IV bolus 1,000 mL  1,000 mL Intravenous Once    sodium chloride 0.9 % IV bolus 1,000 mL  1,000 mL Intravenous Once    HYDROmorphone (Dilaudid) 1 MG/ML injection 0.5 mg  0.5 mg Intravenous Q30 Min PRN    [COMPLETED] ondansetron (Zofran) 4 MG/2ML injection 4 mg  4 mg Intravenous Once    cefOXitin (Mefoxin) 2 g in sodium chloride 0.9% 100 mL IVPB-MBP  2 g Intravenous Once                         Unknown      Available pre-op labs reviewed.        Lab Results   Component Value Date     WBC 24.4 (H) 08/14/2024     RBC 4.58 08/14/2024     HGB 14.7 08/14/2024     HCT 40.2 08/14/2024     MCV  87.8 08/14/2024     MCH 32.1 08/14/2024     MCHC 36.6 08/14/2024     RDW 12.2 08/14/2024     .0 08/14/2024            Airway        Mallampati: II  Mouth opening: 3 FB  TM distance: 4 - 6 cm    Cardiovascular     Cardiovascular exam normal.            Dental     Dentition appears grossly intact             Pulmonary     Pulmonary exam normal.                        Other findings                    ASA: 2 and emergent  Plan: general  NPO status verified and          8/14 Post procedure    Anesthesia Post-op Note     EXPLORATORY LAPAROTOMY WITH EVACUATION OF OF INTRA-ABDOMINAL HEMATOMA, SMALL BOWEL RESECTION, REPAIR OF ENTEROTOMY     Procedure Summary         Date: 08/14/24 Room / Location:  MAIN OR 04 /  MAIN OR     Anesthesia Start: 1152 Anesthesia Stop: 1456     Procedure: EXPLORATORY LAPAROTOMY WITH EVACUATION OF OF INTRA-ABDOMINAL HEMATOMA, SMALL BOWEL RESECTION, REPAIR OF ENTEROTOMY (Abdomen) Diagnosis:       Perioperative dehiscence of abdominal wound with evisceration      (Perioperative dehiscence of abdominal wound with evisceration [T81.31XA])     Surgeons: Nikkie Chavez MD Anesthesiologist: Omar Uriostegui MD     Anesthesia Type: general ASA Status: 2 - Emergent                Anesthesia Type: No value filed.     Vitals Value Taken Time   /84 08/14/24 1502   Temp 98 08/14/24 1502   Pulse 83 08/14/24 1501   Resp 21 08/14/24 1501   SpO2 95 % 08/14/24 1501   Vitals shown include unfiled device data.     Patient Location: ICU     Anesthesia Type: general     Airway Patency: intubated     Postop Pain Control: sedated until time of extubation     Mental Status: sedated until time of extubation     Nausea/Vomiting: none     Cardiopulmonary/Hydration status: stable euvolemic     Complications: no apparent anesthesia related complications     Postop vital signs: stable     Dental Exam: Unchanged from Preop     Sign out given to ICU staff.         8/14 Operative note      Nikkie Chavez MD    Physician  General Surgery     Brief Op Note     Signed     Date of Service: 8/14/2024  5:02 PM     Signed         Pre-Operative Diagnosis: Perioperative dehiscence of abdominal wound with evisceration [T81.31XA]     Post-Operative Diagnosis: Perioperative dehiscence of abdominal wound with evisceration [T81.31XA]      Procedure Performed:   EXPLORATORY LAPAROTOMY WITH EVACUATION OF OF INTRA-ABDOMINAL HEMATOMA, SMALL BOWEL RESECTION, REPAIR OF ENTEROTOMY     Surgeons and Role:     * Nikkie Chavez MD - Primary     Assistant(s):  PA: Mary Moya PA; Christi Haynes PA-C     Surgical Findings: multiple lacerations and injury to small bowel,      Specimen: small bowel ileal resection of approx 80cm with ulcerations and lacerations, primary anastomosis just proximal to the ileocecal valve, enterotomy repaired mid- jejunum     Estimated Blood Loss: Blood Output: 50 mL (8/14/2024  1:17 PM)                              MEDICATIONS ADMINISTERED IN LAST 1 DAY:  Transfuse RBC       Date Action Dose Route User    8/14/2024 1239 New Bag (none) (none) Omar Uriostegui MD          Transfuse RBC       Date Action Dose Route User    8/14/2024 1253 New Bag (none) (none) Omar Uriostegui MD          Transfuse RBC       Date Action Dose Route User    8/14/2024 1341 New Bag (none) (none) Omar Uriostegui MD          albumin human (Albumin) 5% injection       Date Action Dose Route User    8/14/2024 1337 New Bag (none) Intravenous Omar Uriostegui MD          cefOXitin (Mefoxin) 2 g in sodium chloride 0.9% 100 mL IVPB-MBP       Date Action Dose Route User    8/14/2024 1131 New Bag 2 g Intravenous Mary Lerma RN          cefOXitin (Mefoxin) injection       Date Action Dose Route User    8/14/2024 1331 Given 2 g Intravenous Omar Uriostegui MD          fentaNYL (Sublimaze) 50 mcg/mL injection       Date Action Dose Route User    8/14/2024 1410 Given 100 mcg Intravenous Omar Uriostegui MD    8/14/2024 1206 Given 100 mcg  Intravenous Omar Uriostegui MD          heparin (Porcine) 5000 UNIT/ML injection 5,000 Units       Date Action Dose Route User    8/15/2024 0605 Given 5,000 Units Subcutaneous (Left Lower Abdomen) Claudia Salamanca RN    8/14/2024 2155 Given 5,000 Units Subcutaneous (Left Lower Abdomen) Claudia Salamanca RN          HYDROmorphone (Dilaudid) 1 MG/ML injection 0.5 mg       Date Action Dose Route User    8/14/2024 1136 Given 0.5 mg Intravenous Mary Lerma RN          HYDROmorphone (Dilaudid) 1 MG/ML injection 0.4 mg       Date Action Dose Route User    8/14/2024 1730 Given 0.4 mg Intravenous EscClary henry RN    8/14/2024 1644 Given 0.4 mg Intravenous Clary Muñoz RN          HYDROmorphone (Dilaudid) 1 MG/ML injection 0.8 mg       Date Action Dose Route User    8/15/2024 0217 Given 0.8 mg Intravenous Phoebe Stanley RN    8/14/2024 2155 Given 0.8 mg Intravenous Claudia Salamanca RN    8/14/2024 1950 Given 0.8 mg Intravenous Claudia Salamanca RN          lactated ringers infusion       Date Action Dose Route User    8/14/2024 1412 New Bag (none) Intravenous Omar Uriostegui MD    8/14/2024 1244 New Bag (none) Intravenous Omar Uriostegui MD    8/14/2024 1153 New Bag (none) Intravenous Omar Uriostegui MD          lidocaine PF (Xylocaine-MPF) 1% injection       Date Action Dose Route User    8/14/2024 1206 Given 50 mg Injection Omar Uriostegui MD          magnesium sulfate 4 g/100mL IVPB premix 4 g       Date Action Dose Route User    8/14/2024 2247 New Bag 4 g Intravenous Claudia Salamanca RN          midazolam (Versed) 2 MG/2ML injection       Date Action Dose Route User    8/14/2024 1203 Given 2 mg Intravenous Omar Uriostegui MD          norepinephrine (Levophed) 4 mg/250mL infusion premix       Date Action Dose Route User    8/14/2024 1337 Restarted 5 mcg/min Intravenous Omar Uriostegui MD    8/14/2024 1243 Restarted 5 mcg/min Intravenous Omar Uriostegui MD    8/14/2024 1213 Rate/Dose Change 5 mcg/min  Intravenous Omar Uriostegui MD    8/14/2024 1208 New Bag 10 mcg/min Intravenous Omar Uriostegui MD          ondansetron (Zofran) 4 MG/2ML injection 4 mg       Date Action Dose Route User    8/14/2024 1136 Given 4 mg Intravenous Mary Lerma RN          phenylephrine (Keegan-Synephrine) 10 MG/ML injection       Date Action Dose Route User    8/14/2024 1333 Given 100 mcg Intravenous Omar Uriostegui MD    8/14/2024 1209 Given 200 mcg Intravenous Omar Uriostegui MD    8/14/2024 1207 Given 100 mcg Intravenous Omar Uriostegui MD          piperacillin-tazobactam (Zosyn) 4.5 g in dextrose 5% 100 mL IVPB-ADDV       Date Action Dose Route User    8/15/2024 0440 New Bag 4.5 g Intravenous Claudia Salamanca RN    8/14/2024 1559 New Bag 4.5 g Intravenous Clary Muñoz RN          propofol (Diprivan) 10 MG/ML injection       Date Action Dose Route User    8/14/2024 1222 Given 60 mg Intravenous Omar Uriostegui MD    8/14/2024 1206 Given 100 mg Intravenous Omar Uriostegui MD          propofol (Diprivan) 10 MG/ML injection       Date Action Dose Route User    8/14/2024 1522 New Bag 50 mcg/kg/min Intravenous Clary Muñoz RN          propofol (Diprivan) 10 mg/mL infusion premix       Date Action Dose Route User    8/14/2024 1411 New Bag 50 mcg/kg/min × 76.2 kg Intravenous Omar Uriostegui MD          propofol (Diprivan) 10 mg/mL infusion premix       Date Action Dose Route User    8/14/2024 1549 Rate/Dose Change 40 mcg/kg/min × 76.2 kg Intravenous Clary Muñoz RN    8/14/2024 1522 New Bag 50 mcg/kg/min × 76.2 kg Intravenous Clary Muñoz RN          rocuronium (Zemuron) 50 mg/5mL injection       Date Action Dose Route User    8/14/2024 1353 Given 20 mg Intravenous Omar Uriostegui MD    8/14/2024 1330 Given 10 mg Intravenous Omar Uriostegui MD    8/14/2024 1206 Given 100 mg Intravenous Omar Uriostegui MD          sodium chloride 0.9% infusion       Date Action Dose Route User    8/14/2024 1337 New Bag (none)  Intravenous Omar Uriostegui MD    8/14/2024 1153 New Bag (none) Intravenous Omar Uriostegui MD          sodium chloride 0.9% infusion       Date Action Dose Route User    8/15/2024 0153 New Bag (none) Intravenous Shikha Iverson RN    8/14/2024 1549 New Bag (none) Intravenous Clary Muñoz RN          sodium chloride 0.9 % IV bolus 1,000 mL       Date Action Dose Route User    8/14/2024 1121 New Bag 1,000 mL Intravenous Mary Lerma RN          sodium chloride 0.9 % IV bolus 1,000 mL       Date Action Dose Route User    8/14/2024 1125 New Bag 1,000 mL Intravenous Mary Lerma RN          Tetanus-Diphth-Acell Pertussis (Tdap) (Boostrix) injection 0.5 mL       Date Action Dose Route User    8/14/2024 1128 Given 0.5 mL Intramuscular (Right Deltoid) Mary Lerma RN            Vitals (last day)       Date/Time Temp Pulse Resp BP SpO2 Weight O2 Device O2 Flow Rate (L/min) [facility]    08/15/24 0900 -- 73 29 123/80 93 % -- -- -- TE    08/15/24 0830 -- -- -- -- 95 % -- -- 1 L/min     08/15/24 0800 99.2 °F (37.3 °C) 69 26 119/68 93 % -- Nasal cannula 2 L/min TE    08/15/24 0700 -- 72 30 124/74 96 % -- -- --     08/15/24 0600 -- 73 20 132/74 96 % -- -- --     08/15/24 0500 -- 73 32 124/77 94 % -- -- --     08/15/24 0400 98.7 °F (37.1 °C) 81 24 130/74 93 % -- Nasal cannula 2 L/min     08/15/24 0300 -- 78 19 125/74 93 % -- -- --     08/15/24 0200 -- 81 25 141/78 93 % -- -- --     08/15/24 0100 -- 77 20 139/83 93 % -- -- --     08/15/24 0000 99 °F (37.2 °C) 78 24 142/85 94 % -- Nasal cannula -- HW    08/14/24 2300 -- 89 24 137/82 92 % -- -- -- HW    08/14/24 2200 99.9 °F (37.7 °C) 90 30 141/84 93 % -- -- -- HW    08/14/24 2100 -- 85 21 151/73 94 % -- -- -- HW    08/14/24 2000 100.2 °F (37.9 °C) 79 38 149/90 94 % -- Nasal cannula 2 L/min HW    08/14/24 1800 -- 80 23 150/89 93 % -- -- 2 L/min TE    08/14/24 1700 -- 78 40 148/78 93 % -- -- -- TE    08/14/24 1619 -- -- -- -- 93 % -- Nasal cannula 3  L/min     08/14/24 1609 -- 85 22 -- 93 % -- -- --     08/14/24 1600 99.2 °F (37.3 °C) 77 19 135/92 93 % -- Ventilator --     08/14/24 1520 -- 84 22 144/85 93 % -- -- -- TE    08/14/24 1515 -- 86 23 144/86 95 % -- -- --     08/14/24 1510 -- 82 21 -- 95 % -- -- --     08/14/24 1504 -- 82 19 -- 95 % -- -- -- J    08/14/24 1500 -- 85 20 -- 95 % -- -- --     08/14/24 1455 -- 83 19 -- 94 % -- -- --     08/14/24 1450 98.6 °F (37 °C) 79 13 -- 93 % -- Ventilator --     08/14/24 11:45:54 -- 78 28 121/93 96 % -- Nasal cannula 2 L/min     08/14/24 11:40:56 -- 78 25 137/92 96 % -- Nasal cannula 2 L/min     08/14/24 11:33:52 -- 79 26 117/85 95 % -- Nasal cannula 2 L/min     08/14/24 11:28:46 -- 88 29 121/92 95 % -- Nasal cannula 2 L/min     08/14/24 11:21:18 -- 112 21 106/84 95 % -- Nasal cannula 2 L/min     08/14/24 1119 -- -- -- -- -- 168 lb (76.2 kg) -- --     08/14/24 11:18:17 98.9 °F (37.2 °C) 119 26 119/87 95 % -- None (Room air) --     08/14/24 1116 -- -- -- -- -- -- None (Room air) --                  Blood Transfusion Record       Product Unit Status Volume Start End            Transfuse RBC       24  [id number]  [id number] Completed 08/14/24 1501 350 mL 08/14/24 1341 08/14/24 1351       24  [id number]  [id number] Completed 08/14/24 1501 350 mL 08/14/24 1253 08/14/24 1303       24  [id number]  [id number] Completed 08/14/24 1501 350 mL 08/14/24 1239 08/14/24 1249                 [facility] Reference Range & Units 08/14/24 15:42   ABG PH 7.35 - 7.45  7.33 (L)   ABG PCO2 35 - 45 mm Hg 36   ABG PO2 80 - 100 mm Hg 131 (H)   ABG HCO3 21.0 - 27.0 mEq/L 20.1 (L)   ABG BASE EXCESS -2.0 - 2.0 mmol/L -6.2 (L)   Arterial Blood Gas O2Hb 92.0 - 100.0 % 97.4   TOTAL HEMOGLOBIN 13.0 - 17.5 g/dL 15.0   METHEMOGLOBIN 0.4 - 1.5 % SAT 0.6   POTASSIUM BLOOD GAS 3.6 - 5.1 mmol/L 4.5   SODIUM BLOOD  - 145 mmol/L 129 (L)   Lactic Acid (Blood Gas) 0.5 - 2.0 mmol/L 3.5 (H)   PATIENT TEMPERATURE F 98.6       Latest Reference Range & Units 08/14/24 20:37 08/15/24 04:43   WBC 4.0 - 11.0 x10(3) uL 17.9 (H) 14.5 (H)   Hemoglobin 13.0 - 17.5 g/dL 14.3 13.4   Hematocrit 39.0 - 53.0 % 40.0 38.2 (L)   Platelet Count 150.0 - 450.0 10(3)uL 295.0 270.0   RBC 4.30 - 5.70 x10(6)uL 4.58 4.30   MCH 26.0 - 34.0 pg 31.2 31.2   MCHC 31.0 - 37.0 g/dL 35.8 35.1   MCV 80.0 - 100.0 fL 87.3 88.8   RDW % 13.1 13.1   Prelim Neutrophil Abs 1.50 - 7.70 x10 (3) uL  10.78 (H)   Neutrophils Absolute 1.50 - 7.70 x10(3) uL  10.78 (H)   Lymphocytes Absolute 1.00 - 4.00 x10(3) uL  1.84   Monocytes Absolute 0.10 - 1.00 x10(3) uL  1.77 (H)   Eosinophils Absolute 0.00 - 0.70 x10(3) uL  0.00   (H): Data is abnormally high  (L): Data is abnormally low

## 2024-08-15 NOTE — CERTIFICATION
Ref: [facility] 5/3-403, 5/3-602, 5/3-607, 5/3-610    5/3-702, 5/3-813, 5/4-306, 5/4-402, 5/4-403    5/4-405, 5/4-501, 5/4-611, 5/4-705   Inpatient Certificate  Re: Kirby Conteh    (name)     I personally informed the above-named individual of the purpose of this examination and that he or she did not have to speak to me, and that any statements made might be related in court as to the individual's clinical condition or need for services.  Additionally, if this examination was for the purpose of determining that the above-named individual is developmentally disabled and dangerous, I informed the individual of his or her right to speak with a relative, friend or  before the examination, and of his or her right to have an  appointed for him or her if he or she so desired.     Electronically signed by Enrique Weldon MD    Signature of Examiner     On August 15th , 2024 , at     9:00  [x] a.m. [] p.m.,  I personally examined the    (date)  (year)  (time)    above-named individual. The examination was conducted in person at [facility].  Or   [] Via Interactive Communication System (telepsychiatry)      Based on the foregoing examination, it is my opinion that he or she is:  [x]  A person with mental illness who, because of his or her illness is reasonably expected, unless treated on an inpatient basis, to engage in conduct placing such person or another in physical harm or in reasonable expectation of being physically harmed;   []  A person with mental illness who, because of his or her illness is unable to provide for his or her basic physical needs so as to guard himself or herself from serious harm, without the assistance of family or others, unless treated on an inpatient basis;   []  A person with mental illness who: refuses treatment or is not adhering adequately to prescribed treatment; because of the nature of his or her illness is unable to understand his or her need for treatment; and  if not treated on an inpatient basis, is reasonably expected based on his or her behavioral history, to suffer mental or emotional deterioration and is reasonably expected, after such deterioration, to meet the criteria of either paragraph one or paragraph two above;   []  An individual who is developmentally disabled and unless treated on an in-patient basis is reasonably expected to inflict serious physical harm upon himself or herself or others in the near future, and/or   [x]  Is in need of immediate hospitalization for the prevention of such harm.   I base my opinion on the following (including clinical observations, factual information):  60 yo male with severe major depression attempted suicide by cutting his left wrist and stabbing himself in the abdomen.   I believe that the individual is subject to: []  Involuntary inpatient admission and is not in need of immediate hospitalization   (check one) [x]  Involuntary inpatient admission and is in need of immediate hospitalization    []  Judicial inpatient admission and is not in need of immediate hospitalization    []  Judicial inpatient admission and is in need of immediate hospitalization     Date: 8/15/2024 Signature: Electronically signed by Enrique Weldon MD   Title: MD Printed Name: Enrique Weldon MD     -2006 (R-12-22) Inpatient Certificate  Printed by Authority of the [facility] -0- Copies Page 1 of 1

## 2024-08-15 NOTE — PLAN OF CARE
Assumed care of patient following shift report. Pt alert and oriented, following commands. C/o abdominal pain, relief with PRN dilaudid. Pt noticing increased numbness of fingers on left hand, ZULEYMA Crooks notified. NSR on tele monitor. Normotensive. NG to LIS. Fischer intact. Suicide precautions continued. See MAR and flowsheets for additional information.

## 2024-08-16 ENCOUNTER — ANESTHESIA EVENT (OUTPATIENT)
Dept: SURGERY | Facility: HOSPITAL | Age: 59
End: 2024-08-16
Payer: COMMERCIAL

## 2024-08-16 LAB
ANION GAP SERPL CALC-SCNC: 4 MMOL/L (ref 0–18)
BASOPHILS # BLD AUTO: 0.01 X10(3) UL (ref 0–0.2)
BASOPHILS NFR BLD AUTO: 0.1 %
BUN BLD-MCNC: 17 MG/DL (ref 9–23)
CALCIUM BLD-MCNC: 8.6 MG/DL (ref 8.7–10.4)
CHLORIDE SERPL-SCNC: 107 MMOL/L (ref 98–112)
CO2 SERPL-SCNC: 25 MMOL/L (ref 21–32)
CREAT BLD-MCNC: 0.72 MG/DL
EGFRCR SERPLBLD CKD-EPI 2021: 105 ML/MIN/1.73M2 (ref 60–?)
EOSINOPHIL # BLD AUTO: 0 X10(3) UL (ref 0–0.7)
EOSINOPHIL NFR BLD AUTO: 0 %
ERYTHROCYTE [DISTWIDTH] IN BLOOD BY AUTOMATED COUNT: 13 %
GLUCOSE BLD-MCNC: 101 MG/DL (ref 70–99)
HCT VFR BLD AUTO: 32.5 %
HGB BLD-MCNC: 11.4 G/DL
IMM GRANULOCYTES # BLD AUTO: 0.02 X10(3) UL (ref 0–1)
IMM GRANULOCYTES NFR BLD: 0.2 %
LYMPHOCYTES # BLD AUTO: 1.35 X10(3) UL (ref 1–4)
LYMPHOCYTES NFR BLD AUTO: 15.4 %
MAGNESIUM SERPL-MCNC: 1.8 MG/DL (ref 1.6–2.6)
MCH RBC QN AUTO: 31.2 PG (ref 26–34)
MCHC RBC AUTO-ENTMCNC: 35.1 G/DL (ref 31–37)
MCV RBC AUTO: 89 FL
MONOCYTES # BLD AUTO: 0.86 X10(3) UL (ref 0.1–1)
MONOCYTES NFR BLD AUTO: 9.8 %
NEUTROPHILS # BLD AUTO: 6.52 X10 (3) UL (ref 1.5–7.7)
NEUTROPHILS # BLD AUTO: 6.52 X10(3) UL (ref 1.5–7.7)
NEUTROPHILS NFR BLD AUTO: 74.5 %
OSMOLALITY SERPL CALC.SUM OF ELEC: 284 MOSM/KG (ref 275–295)
PLATELET # BLD AUTO: 223 10(3)UL (ref 150–450)
POTASSIUM SERPL-SCNC: 3.9 MMOL/L (ref 3.5–5.1)
RBC # BLD AUTO: 3.65 X10(6)UL
SODIUM SERPL-SCNC: 136 MMOL/L (ref 136–145)
WBC # BLD AUTO: 8.8 X10(3) UL (ref 4–11)

## 2024-08-16 PROCEDURE — 99232 SBSQ HOSP IP/OBS MODERATE 35: CPT | Performed by: OTHER

## 2024-08-16 RX ORDER — MAGNESIUM OXIDE 400 MG/1
400 TABLET ORAL ONCE
Status: COMPLETED | OUTPATIENT
Start: 2024-08-16 | End: 2024-08-16

## 2024-08-16 NOTE — PROGRESS NOTES
Surgery planned for tomorrow AM. NPO after midnight, hold Heparin tonight.     Rey Jacobson MD   Hand, Wrist, & Elbow Surgery  [email or web address]  t: [phone]  f: [phone]

## 2024-08-16 NOTE — PROGRESS NOTES
[facility]  Report of Psychiatric Progress Note    Kirby Conteh Patient Status:  Inpatient    1965 MRN [id number]   Location [facility] 4SW-A Attending Nikkie Chavez MD   Hosp Day # 2 PCP SALVATORE HILL DO     Date of Admission: 2024  Date of Service: 2024  Reason for Consultation: Suicide attempt    Impression:  Psychiatric Diagnoses:  Suicide attempt by cutting his left wrist and stabbing himself in the abdomen on 24. He was just hospitalized at [facility] psych unit in [place] from 24 to 24. Contributing factors for suicide attempt include: 1) recent medical issues (basal cell skin CA, ear infection; concern for a neurologic condition ie pinched nerve causing Rt arm and leg numbness/tingling) increasing his anxiety 2) severe insomnia and 3) feeling ineffective at work because he can't concentrate and focus.     Major depressive disorder, recurrent, severe, without psychotic features. He has no delusions or hallucinations at this time.     Anxiety disorder unspecified. Rule out NOEMI. Rule out illness anxiety disorder. Rule out functional neurologist symptom disorder (right arm and hand numbness and tingling).     Insomnia unspecified, likely related to depression.      Rule Out Diagnoses:  Bipolar 2 depression.     Personality Diagnosis:  OCPD traits.     Pertinent Medical Diagnoses:  Self-inflicted abdominal wound s/p ex-lap and evacuation of intra-abdominal hematoma, small bowel resection, and repair of enterotomy on 24.    Recommendations:  1) Patient asks if a C-spine MRI can be performed in the hospital. He was scheduled for an outpatient MRI to eval for spinal and foraminal stenosis to explain his recurrent right arm and hand numbness and tingling. Defer to surgery service.     2) Continue Wellbutrin XL 150mg po daily for depression. This was started in the psych unit on 24. Do not think the Wellbutrin is causing his right arm numbness and  tingling.     3) Continue Zoloft 50mg po daily to help with anxiety and depression. He took it for a few days before he was admitted to the psych unit last week. Discussed using both Zoloft and Wellbutrin to treat his severe depression at this time and he is in agreement.     4) Continue Restoril 15mg po nightly to help with insomnia.     5) Continue Ativan PRN to 1mg po or IV twice a day PRN anxiety or insomnia.     6) Continue suicidal precautions and 1:1 sitter. He will be transferred to the psych unit when medically clear.    7) I can be reached by [facility] chat this weekend. Will see patient again on Monday 8/19/24.      Enrique Weldon MD    History of Present Illness:  60 yo male with recurrent major depression and hx anxiety was admitted on 8/14/24 after attempting suicide by cutting his left wrist and stabbing himself in the abdomen on 8/13/24.     Per hx from sister, he has a hx of recurrent depression that started in his 20's. She recalls him endorsing sadness, low motivation, apathy, and a lack of enjoyment in life during these episodes. He was very depressed after his divorce 15 yrs ago but was still able to function and work. She is not aware of him having suicidal ideation until the past month.     About 1 month ago, he called her complaining of many neurologic symptoms like numbness and tingling and his neck and arms and legs. He c/o headache and sinus fullness and was diagnosed with an ear infection. 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 He was very anxious about having a physical illness and c/o insomnia. He endorsed \"mental deterioration\" and feeling depressed and helpless and hopeless. She encouraged him to seek help. He saw his primary MD 2 wks ago and was prescribed Temazepam at 7.5mg nightly which did not help with sleep or anxiety. He was prescribed Zoloft which she doesn't think he started (note- he did take it for a few days per his report). Due to his worsening anxiety and  depression, she had him visit her in [facility]. During his visit with her, he admitted to having suicidal ideation. She brought him to the local ED where he was certified for inpatient psych treatment. There were no psych beds in [facility] so he was transferred to [facility] psych unit in [place] on 8/5/2024. He was treated with Wellbutrin XL 150mg daily for depression and Xanax 0.25mg QID titrated up to 0.5mg QID for anxiety and Restoril 15mg nightly for insomnia. He would call her and appeared paranoid, talking about how he felt locked up in the psych unit. He no longer had suicidal ideation and was discharged home on 8/13/24. Sister's  had to have a medical procedure, so she was unable to pick him up and drive him home. The psych unit set up a cab for him to go home.     Today, sister visited him and found him in the bathtub with the lacerated wrist and the self-inflicted abdominal wound. She called 911 immediately and he was brought to the ED. He went to the OR today and is s/p ex-lap and evacuation of intra-abdominal hematoma, small bowel resection, and repair of enterotomy. He remains intubated.  Sister is not aware of any specific triggers or new stressors contributing to the suicide attempt.    Interval Hx:  8/15/24- He shares how he has felt depressed the past 2 months. Initially, he developed physical symptoms including numbness/tingling of his neck and right arm and bilateral legs. This scared him. He also c/o sinus fullness and hearing a pulsating sound. He was diagnosed with an ear infection and treated with antibiotics. He also had a skin exam that revealed basal cell cancer. He needs to have it excised. He had xrays of his neck and back and was told he had some arthritis. He was scheduled to get a C spine MRI.     At baseline, he describes a hx of generalized anxiety. His anxiety increased with the medical issues. He was ruminating and worrying about having a serious illness. He began  having troubling sleeping, both falling and staying asleep. This caused him to feel more tired and have trouble concentrating. He felt ineffective at work. He felt more depressed and overwhelmed and helpless and hopeless.     He began having passive suicidal thoughts about 1 month ago. He shared this with his sister on 8/4/24 and was admitted to the psych unit on 8/5/25 as mentioned above. He didn't like it there. He didn't get much from the group therapy. He thought many of the patients were there because they were homeless and needed a place to stay. He only met with the  once during the 8 days and really wanted individual psychotherapy. The psychiatrist started Restoril/Xanax/Wellbutrin which did help with sleep. He still felt anxious and depressed and helpless and hopeless. He wanted to die to escape the dread and anxiety of living. He told staff that he was no longer suicidal so he could leave. He had a plan to discharge and overdose on the Restoril but they didn't give him any. He thought about overdosing on Ibuprofen or Aspirin. He looked it up on the internet and realized that it doesn't work. So he impulsively took a knife and cut his wrist and stabbed himself in the abdomen to kill himself. The pain stopped him from cutting deeper. He denied using any substances. He laid in the bathtub and waited to bleed out and die. He felt sad about leaving his 15 yr old daughter, but \"relieved\" at the thought of not having to suffer anymore.     When asked how he feels about being alive now, he is ambivalent. He doesn't want to leave his daughter but he doesn't want to be alive. He has passive suicidal ideation at this time.    8/16/2024- He fell asleep with the Restoril at 15mg nightly, but had difficulty staying asleep. He received Ativan 1mg IV at 2319 that helped him sleep. He feels anxious and depressed and regretful. He goes back to that day he cut and stabbed himself. He felt numb at the time. He is  processing the \"dark labyrinth\" of his state of mind at that time.     He has been anxious and ruminating a lot today. He is worried about work and his daughter. He feels like he failed his family, his daughter, his coworkers. He is anxious about unexplained physical symptoms including right arm numbness and tingling that keeps coming back. He c/o tinnitis which his ENT doc is aware of it. He thinks about the upcoming left hand surgery tomorrow and how he caused it. He struggles with everything ahead, in terms of both physical and mental recovery. He continues to have some hopeless feelings and passive suicidal ideation.      Past Psychiatric History:  1) Major depressive disorder, recurrent. He had his first depressive episode in his 20's. About 15 yrs ago, he had an episode when he was going through a divorce. Hx of suicidal ideation but no known attempts per hx from sister.     2) Anxiety unspecified.     Substance Use History: None per sister.    Psych Family History: Mother with life long hx of depression. One psychiatrist thought his mother had Bipolar 2 disorder.     Social and Developmental History:  with a 15 yr old daughter. 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 Patient's sister Miguelina [phone] is his main support.     Past Medical History:    Anxiety    Depression     History reviewed. No pertinent surgical history.  History reviewed. No pertinent family history.   reports that he has never smoked. He has never used smokeless tobacco. He reports current alcohol use. He reports that he does not use drugs.    Allergies:  No Known Allergies    Medications:    Current Facility-Administered Medications:     piperacillin-tazobactam (Zosyn) 3.375 g in dextrose 5% 100 mL IVPB-ADDV, 3.375 g, Intravenous, Q8H    acetaminophen (Ofirmev) 10 mg/mL infusion premix 1,000 mg, 1,000 mg, Intravenous, Q8H    ketorolac (Toradol) 15 MG/ML injection 15 mg, 15 mg, Intravenous, q6h    oxyCODONE  immediate release tab 5 mg, 5 mg, Oral, Q4H PRN    pantoprazole (Protonix) 40 mg in sodium chloride 0.9% PF 10 mL IV push, 40 mg, Intravenous, Q24H    sertraline (Zoloft) tab 50 mg, 50 mg, Oral, Daily    buPROPion ER (Wellbutrin XL) 24 hr tab 150 mg, 150 mg, Oral, Daily    temazepam (Restoril) cap 15 mg, 15 mg, Oral, Nightly    LORazepam (Ativan) tab 1 mg, 1 mg, Oral, BID PRN **OR** LORazepam (Ativan) 2 mg/mL injection 1 mg, 1 mg, Intravenous, BID PRN    sodium chloride 0.9% infusion, , Intravenous, Continuous    heparin (Porcine) 5000 UNIT/ML injection 5,000 Units, 5,000 Units, Subcutaneous, Q8H ROLY    HYDROmorphone (Dilaudid) 1 MG/ML injection 0.2 mg, 0.2 mg, Intravenous, Q2H PRN **OR** HYDROmorphone (Dilaudid) 1 MG/ML injection 0.4 mg, 0.4 mg, Intravenous, Q2H PRN **OR** HYDROmorphone (Dilaudid) 1 MG/ML injection 0.8 mg, 0.8 mg, Intravenous, Q2H PRN    ondansetron (Zofran) 4 MG/2ML injection 4 mg, 4 mg, Intravenous, Q6H PRN    prochlorperazine (Compazine) 10 MG/2ML injection 5 mg, 5 mg, Intravenous, Q8H PRN    Review of Systems   Psychiatric/Behavioral:  Positive for depression and suicidal ideas. Negative for hallucinations and substance abuse. The patient is nervous/anxious and has insomnia.      Mental Status Exam:     Objective      Vitals:    08/16/24 1623   BP: 150/81   Pulse: 84   Resp: 18   Temp: 98.7 °F (37.1 °C)     Appearance: fair grooming  Behavior: cooperative  Gait: N/A    Speech: fluent    Mood: anxious and depressed  Affect: congruent    Thought process: linear  Thought content: no hallucinations or delusions    Orientation: self, hospital, month, year, situation  Attention and Concentration: fair  Memory: intact remote and recent  Language: able to name and repeat  Fund of Knowledge: able to recite name of current [place] president    Insight: fair now  Judgment: POOR regarding suicide attempt, fair in accepting mental health treatment at this time    Laboratory Data:  Lab Results   Component Value  Date    WBC 8.8 08/16/2024    HGB 11.4 08/16/2024    HCT 32.5 08/16/2024    .0 08/16/2024    CREATSERUM 0.72 08/16/2024    BUN 17 08/16/2024     08/16/2024    K 3.9 08/16/2024     08/16/2024    CO2 25.0 08/16/2024     08/16/2024    CA 8.6 08/16/2024    MG 1.8 08/16/2024

## 2024-08-16 NOTE — PROGRESS NOTES
Patient alert x 4, room air, denies chest pain and shortness of breath. Suicide precautions in place with 1x1 sitter. He is pleasant and cooperative. Minimal incision pain, PRNs per MAR. LUQ and RUQ wound with 4x4 and abd pad, C/D/I. Midline with aquacell dressing C/D/I. Left wrist wound with sutures and Kerlix roll, C/D/I. Patient endorsing minimal sensation to left hand with motor weakness, radial pulse KATIE, capillary refill <3 seconds. Afebrile. IV fluids and antibiotics infusing as ordered. Plan of care discussed with patient, all questions addressed.

## 2024-08-16 NOTE — PROGRESS NOTES
[facility]  Progress Note    Kirby Conteh Patient Status:  Inpatient    1965 MRN [id number]   [facility] 3NW-A Attending Nikkie Chavez MD   Hosp Day # 2 PCP SALVATORE HILL, DO     Subjective:  The patient is resting comfortably in bed today.  He denies new complaints.  NG tube in place.  He denies fevers.  Denies a bowel movement.  He denies fever or chills.    Objective/Physical Exam:  General: Alert, orientated x3.  Cooperative.  No apparent distress.  Vital Signs:  Blood pressure (!) 136/96, pulse 70, temperature 97.6 °F (36.4 °C), temperature source Oral, resp. rate 18, height 71\", weight 168 lb (76.2 kg), SpO2 97%.  Wt Readings from Last 3 Encounters:   08/15/24 168 lb (76.2 kg)     Lungs: No respiratory distress.  Cardiac: Regular rate and rhythm.   Abdomen:  Soft, non distended, non tender, with no rebound or guarding.  No peritoneal signs.   Extremities:  No lower extremity edema noted.    Incision: Clean, dry, intact, no erythema      Intake/Output:    Intake/Output Summary (Last 24 hours) at 2024 1256  Last data filed at 2024 1206  Gross per 24 hour   Intake 2172 ml   Output 2425 ml   Net -253 ml     I/O last 3 completed shifts:  In: 3671 [I.V.:3471; IV PIGGYBACK:200]  Out: 2450 [Urine:1650; Emesis/NG output:800]  I/O this shift:  In: 30 [P.O.:30]  Out: 1150 [Emesis/NG output:1150]    Medications:    tetanus immune globulin  250 Units Intramuscular Once    acetaminophen  1,000 mg Intravenous Q8H    ketorolac  15 mg Intravenous q6h    pantoprazole  40 mg Intravenous Q24H    sertraline  50 mg Oral Daily    buPROPion ER  150 mg Oral Daily    temazepam  15 mg Oral Nightly    piperacillin-tazobactam  3.375 g Intravenous q12h    heparin  5,000 Units Subcutaneous Q8H ROLY       Labs:  Lab Results   Component Value Date    WBC 8.8 2024    HGB 11.4 2024    HCT 32.5 2024    .0 2024     Lab Results   Component Value Date     2024     K 3.9 08/16/2024     08/16/2024    CO2 25.0 08/16/2024    BUN 17 08/16/2024    CREATSERUM 0.72 08/16/2024     08/16/2024    CA 8.6 08/16/2024     Lab Results   Component Value Date    INR 1.15 08/14/2024         Assessment  Patient Active Problem List   Diagnosis    Penetrating abdominal trauma, initial encounter    Arm laceration, left, initial encounter    Major depressive disorder, recurrent severe without psychotic features (HCC)    Anxiety disorder    Insomnia     Postop day 2 exploratory laparotomy with evacuation of intra-abdominal hematoma, small bowel resection and repair of enterotomy  Suicide attempt  Left median nerve injury      Plan:  Continue NG tube to low remittent suction.  NPO.  He may have ice chips and sips of clears.  Clamp trial with return of bowel function.  Patient scheduled for left wrist exploration with Ortho tomorrow.  Continue IV antibiotics.  Ambulate and up to chair.  DVT prophylax with heparin  GI prophylaxis with Protonix.      Quality:  DVT Mechanical Prophylaxis:   SCDs,    DVT Pharmacologic Prophylaxis   Medication    heparin (Porcine) 5000 UNIT/ML injection 5,000 Units                Code Status: Full Code  Fischer: No urinary catheter in place  Fischer Duration (in days):   Central line:    ALLI:         Christi Haynes PA-C  8/16/2024  12:56 PM      Addendum:    I have seen and examined the patient; I obtained and performed the above history, physical examination, assessment and plan.  I have I have edited the above to reflect my evaluation, opinion, and physical exam findings.    Exam and plan as above.    Unpacked and examined.  Care plan discussed with patient and nursing staff.  Trial of ice chips and sips of water until bowel function improves.  All questions answered.    I, Dr. Edward Baldwin, personally performed the services described in this documentation  by Ms. Haynes, and they are both accurate and complete.    Edward Baldwin MD FACS  [facility]  Surgery

## 2024-08-16 NOTE — ANESTHESIA PREPROCEDURE EVALUATION
PRE-OP EVALUATION    Patient Name: Kirby Conteh    Admit Diagnosis: Penetrating abdominal trauma, initial encounter [S31.109A]  Arm laceration, left, initial encounter [S41.112A]    Pre-op Diagnosis: LEFT FOREARM MEDIAL NERVE AND TENDON LACERATION    LEFT FOREARM WOUND EXPLORATION, POSSIBLE REPAIR OF TENDON NERVE AND ARTERY    Anesthesia Procedure: LEFT FOREARM WOUND EXPLORATION, POSSIBLE REPAIR OF TENDON NERVE AND ARTERY (Left)    Surgeons and Role:     * Ezio Jacobson MD - Primary    Pre-op vitals reviewed.  Temp: 98.2 °F (36.8 °C)  Pulse: 75  Resp: 20  BP: 148/97  SpO2: 95 %  Body mass index is 23.43 kg/m².    Current medications reviewed.  Hospital Medications:   [COMPLETED] magnesium oxide (Mag-Ox) tab 400 mg  400 mg Oral Once    acetaminophen (Ofirmev) 10 mg/mL infusion premix 1,000 mg  1,000 mg Intravenous Q8H    ketorolac (Toradol) 15 MG/ML injection 15 mg  15 mg Intravenous q6h    oxyCODONE immediate release tab 5 mg  5 mg Oral Q4H PRN    pantoprazole (Protonix) 40 mg in sodium chloride 0.9% PF 10 mL IV push  40 mg Intravenous Q24H    sertraline (Zoloft) tab 50 mg  50 mg Oral Daily    buPROPion ER (Wellbutrin XL) 24 hr tab 150 mg  150 mg Oral Daily    temazepam (Restoril) cap 15 mg  15 mg Oral Nightly    LORazepam (Ativan) tab 1 mg  1 mg Oral BID PRN    Or    LORazepam (Ativan) 2 mg/mL injection 1 mg  1 mg Intravenous BID PRN    piperacillin-tazobactam (Zosyn) 3.375 g in dextrose 5% 100 mL IVPB-ADDV  3.375 g Intravenous q12h    [COMPLETED] Tetanus-Diphth-Acell Pertussis (Tdap) (Boostrix) injection 0.5 mL  0.5 mL Intramuscular Once    [COMPLETED] sodium chloride 0.9 % IV bolus 1,000 mL  1,000 mL Intravenous Once    [COMPLETED] sodium chloride 0.9 % IV bolus 1,000 mL  1,000 mL Intravenous Once    [COMPLETED] ondansetron (Zofran) 4 MG/2ML injection 4 mg  4 mg Intravenous Once    [COMPLETED] cefOXitin (Mefoxin) 2 g in sodium chloride 0.9% 100 mL IVPB-MBP  2 g Intravenous Once    sodium chloride 0.9%  infusion   Intravenous Continuous    heparin (Porcine) 5000 UNIT/ML injection 5,000 Units  5,000 Units Subcutaneous Q8H ROLY    HYDROmorphone (Dilaudid) 1 MG/ML injection 0.2 mg  0.2 mg Intravenous Q2H PRN    Or    HYDROmorphone (Dilaudid) 1 MG/ML injection 0.4 mg  0.4 mg Intravenous Q2H PRN    Or    HYDROmorphone (Dilaudid) 1 MG/ML injection 0.8 mg  0.8 mg Intravenous Q2H PRN    ondansetron (Zofran) 4 MG/2ML injection 4 mg  4 mg Intravenous Q6H PRN    prochlorperazine (Compazine) 10 MG/2ML injection 5 mg  5 mg Intravenous Q8H PRN    [COMPLETED] magnesium sulfate 4 g/100mL IVPB premix 4 g  4 g Intravenous Once       Outpatient Medications:     Medications Prior to Admission   Medication Sig Dispense Refill Last Dose    temazepam 7.5 MG Oral Cap Take 2 capsules (15 mg total) by mouth nightly.   Past Week    ALPRAZolam 0.5 MG Oral Tab Take 1 tablet (0.5 mg total) by mouth 4 (four) times daily.   Past Week    fluticasone propionate 50 MCG/ACT Nasal Suspension 1 spray by Nasal route every 6 (six) hours as needed for Rhinitis.   Past Week    cetirizine 10 MG Oral Tab Take 1 tablet (10 mg total) by mouth daily.   Past Week    buPROPion  MG Oral Tablet 24 Hr Take 1 tablet (150 mg total) by mouth daily.   Past Week       Allergies: Patient has no known allergies.      Anesthesia Evaluation    Patient summary reviewed.    Anesthetic Complications  (-) history of anesthetic complications         GI/Hepatic/Renal    Negative GI/hepatic/renal ROS.                             Cardiovascular    Negative cardiovascular ROS.    Exercise tolerance: good     MET: >4                                           Endo/Other    Negative endo/other ROS.                              Pulmonary    Negative pulmonary ROS.                       Neuro/Psych      (+) depression  (+) anxiety                      Severe depression       History reviewed. No pertinent surgical history.  Social History     Socioeconomic History    Marital status:  Single   Tobacco Use    Smoking status: Never    Smokeless tobacco: Never   Vaping Use    Vaping status: Never Used   Substance and Sexual Activity    Alcohol use: Yes     Comment: \"a few\"    Drug use: Never     History   Drug Use Unknown     Available pre-op labs reviewed.  Lab Results   Component Value Date    WBC 8.8 08/16/2024    RBC 3.65 (L) 08/16/2024    HGB 11.4 (L) 08/16/2024    HCT 32.5 (L) 08/16/2024    MCV 89.0 08/16/2024    MCH 31.2 08/16/2024    MCHC 35.1 08/16/2024    RDW 13.0 08/16/2024    .0 08/16/2024     Lab Results   Component Value Date     08/16/2024    K 3.9 08/16/2024     08/16/2024    CO2 25.0 08/16/2024    BUN 17 08/16/2024    CREATSERUM 0.72 08/16/2024     (H) 08/16/2024    CA 8.6 (L) 08/16/2024     Lab Results   Component Value Date    INR 1.15 08/14/2024         Airway      Mallampati: II  Mouth opening: >3 FB  TM distance: > 6 cm  Neck ROM: full Cardiovascular    Cardiovascular exam normal.  Rhythm: regular  Rate: normal     Dental             Pulmonary    Pulmonary exam normal.  Breath sounds clear to auscultation bilaterally.               Other findings  Dentition grossly normal. NGT in place.          ASA: 2   Plan: general  NPO status verified and patient meets guidelines.    Post-procedure pain management plan discussed with surgeon and patient.  Surgeon requests: regional block  Comment: Possible brachial plexus blockade r/b/a d/w patient.  Plan/risks discussed with: patient              Present on Admission:  **None**

## 2024-08-16 NOTE — PAYOR COMM NOTE
--------------  CONTINUED STAY REVIEW    Payor: ERENDIRA MONTEMAYOR  Subscriber #:  [id number]  Authorization Number: [id number]    Admit date: 8/14/24  Admit time:  2:46 PM    REVIEW DOCUMENTATION:      8/15 IM             Subjective:  No acute events overnight. Awaiting ortho eval for numbness of fingers in left hand.     Objective:     Medications:  Scheduled Medications    acetaminophen  1,000 mg Intravenous Q8H    ketorolac  15 mg Intravenous q6h    pantoprazole  40 mg Intravenous Q24H    heparin  5,000 Units Subcutaneous Q8H ROLY    piperacillin-tazobactam  4.5 g Intravenous q12h            Vent Mode: PS  FiO2 (%):  [40 %-100 %] 40 %  S RR:  [16] 16  S VT:  [450 mL] 450 mL  PEEP/CPAP (cm H2O):  [5 cm H20] 5 cm H20  MAP (cm H2O):  [7.6-7.8] 7.8        Intake/Output Summary (Last 24 hours) at 8/15/2024 1112  Last data filed at 8/15/2024 0800      Gross per 24 hour   Intake 6350.3 ml   Output 1925 ml   Net 4425.3 ml         /72   Pulse 70   Temp 99.2 °F (37.3 °C) (Temporal)   Resp 24   Ht 180.3 cm (5' 11\")   Wt 168 lb (76.2 kg)   SpO2 93%   BMI 23.43 kg/m²      Physical Exam:     General Appearance: Alert, cooperative, no distress, appears stated age  Neck: No JVD, neck supple, no adenopathy, trachea midline, no carotid bruits  Lungs: Clear to auscultation bilaterally, respirations unlabored  Heart: Regular rate and rhythm, S1 and S2 normal, no murmur, rub or gallop  Abdomen: Soft, non-tender, bowel sounds active all four quadrants, no masses, no organomegaly, surgical dressing C/D/I  Extremities: Extremities normal, atraumatic, no cyanosis or edema,capillary refill <3 sec.    Pulses: 2+ and symmetric all extremities  Skin: Skin color, texture, turgor normal for ethnicity, no rashes or lesions, warm and dry         Recent Labs   Lab 08/15/24  0443   RBC 4.30   HGB 13.4   HCT 38.2*   MCV 88.8   MCH 31.2   MCHC 35.1   RDW 13.1   NEPRELIM 10.78*   WBC 14.5*   .0            Recent Labs   Lab 08/14/24  1119  08/14/24  2037 08/15/24  0443   * 159* 124*   BUN 35* 26* 23   CREATSERUM 3.39* 1.72* 1.23   CA 10.3 8.4* 8.5*   ALB 4.6 3.6 3.5   * 135* 136   K 4.1 4.2 4.6   CL 98 107 106   CO2 23.0 20.0* 24.0   ALKPHO 61 45 45   AST 30 38* 32   ALT 36 36 35   BILT 1.5* 2.1* 1.2   TP 7.0 5.6* 5.4*          Recent Labs   Lab 08/14/24  1542   ABGPHT 7.33*   JQGMBX3H 36   DJRZR3Y 131*   ABGHCO3 20.1*   ABGBE -6.2*   TEMP 98.6   NÉSTOR Positive   SITE Right Radial   DEV    THGB 15.0      No results for input(s): \"BNP\" in the last 168 hours.      Recent Labs   Lab 08/14/24  1119   *         XR CHEST AP PORTABLE  (CPT=71045)     Result Date: 8/14/2024  CONCLUSION:   1.  Endotracheal tube is 7 cm above the ari at the thoracic inlet.  2. No acute airspace disease.   LOCATION:  [facility]      Dictated by (CST): Scottie Menendez MD on 8/14/2024 at 4:00 PM     Finalized by (CST): Scottie Menendez MD on 8/14/2024 at 4:00 PM        XR CHEST AP PORTABLE  (CPT=71045)     Result Date: 8/14/2024  CONCLUSION:   Normal cardiac and mediastinal contours.  No pulmonary edema or focal airspace consolidation.  The pleural spaces are clear.  No acute osseous findings.  Prominent gaseous distention of upper abdominal bowel loops.   LOCATION:  [facility]      Dictated by (CST): Ahsan Piedra MD on 8/14/2024 at 11:38 AM     Finalized by (CST): Ahsan Piedra MD on 8/14/2024 at 11:39 AM             Assessment/Plan:     Penetrating abdominal trauma 2/2 to self inflicted POD #1 s/p ex lap with evacuation of intra-abdominal hematoma, small bowel resection, repair of enterotomy  - Post op care per surgery  - NGT to LIS, NPO, ok for meds, sips of clears  - Pain management  - IVF  - Discontinue davis catheter  - Continue Zosyn (8/14-)     Left arm laceration  -Ortho on consult     Suicidal ideation  Depression  - Psych following  - 1:1 Sitter  - Suicide precautions     Hyperglycemia  - Check A1c     F/E/N  -NPO  -IVF  -replete electrolytes as needed      Proph  -subcutaneous heparin. ppi  -SCDs  - PT/OT     Dispo  - full code  -ICU monitoring        Plan of care discussed with intensivist on-call, Dr. Osorio.        Carli Leon [facility]  Critical Care  [id number]        Pulmonary/Critical Care Physician Addendum     Kirby BOGGS Wero was interviewed and examined personally.  I reviewed and agree with the APN's documentation above of the patient's history, physical examination, test results, diagnoses, and plan of treatment.      Labs and imaging reviewed.     ASSESSMENT/PLAN  Extubated yesterday afternoon.  Hemodynamically stable.  F/u surgery recs and psych recs.   Hand surgery consulted to see.   Stable for transfer out of ICU. Pulmonary/CCM service will sign off upon physical transfer out of the ICU. Contact our service with questions/concerns.       8/16 IM   sp Day # 2 PCP SALVATORE HILL, DO      Subjective:  The patient is resting comfortably in bed today.  He denies new complaints.  NG tube in place.  He denies fevers.  Denies a bowel movement.  He denies fever or chills.     Objective/Physical Exam:  General: Alert, orientated x3.  Cooperative.  No apparent distress.  Vital Signs:  Blood pressure (!) 136/96, pulse 70, temperature 97.6 °F (36.4 °C), temperature source Oral, resp. rate 18, height 71\", weight 168 lb (76.2 kg), SpO2 97%.      Wt Readings from Last 3 Encounters:   08/15/24 168 lb (76.2 kg)      Lungs: No respiratory distress.  Cardiac: Regular rate and rhythm.   Abdomen:  Soft, non distended, non tender, with no rebound or guarding.  No peritoneal signs.   Extremities:  No lower extremity edema noted.    Incision: Clean, dry, intact, no erythema        Intake/Output:     Intake/Output Summary (Last 24 hours) at 8/16/2024 1256  Last data filed at 8/16/2024 1206      Gross per 24 hour   Intake 2172 ml   Output 2425 ml   Net -253 ml      I/O last 3 completed shifts:  In: 3671 [I.V.:3471; IV PIGGYBACK:200]  Out: 2450 [Urine:1650; Emesis/NG  output:800]  I/O this shift:  In: 30 [P.O.:30]  Out: 1150 [Emesis/NG output:1150]     Medications:   Scheduled Medications    tetanus immune globulin  250 Units Intramuscular Once    acetaminophen  1,000 mg Intravenous Q8H    ketorolac  15 mg Intravenous q6h    pantoprazole  40 mg Intravenous Q24H    sertraline  50 mg Oral Daily    buPROPion ER  150 mg Oral Daily    temazepam  15 mg Oral Nightly    piperacillin-tazobactam  3.375 g Intravenous q12h    heparin  5,000 Units Subcutaneous Q8H ROLY            Labs:        Lab Results   Component Value Date     WBC 8.8 08/16/2024     HGB 11.4 08/16/2024     HCT 32.5 08/16/2024     .0 08/16/2024            Lab Results   Component Value Date      08/16/2024     K 3.9 08/16/2024      08/16/2024     CO2 25.0 08/16/2024     BUN 17 08/16/2024     CREATSERUM 0.72 08/16/2024      08/16/2024     CA 8.6 08/16/2024            Lab Results   Component Value Date     INR 1.15 08/14/2024            Assessment      Patient Active Problem List   Diagnosis    Penetrating abdominal trauma, initial encounter    Arm laceration, left, initial encounter    Major depressive disorder, recurrent severe without psychotic features (HCC)    Anxiety disorder    Insomnia      Postop day 2 exploratory laparotomy with evacuation of intra-abdominal hematoma, small bowel resection and repair of enterotomy  Suicide attempt  Left median nerve injury        Plan:  Continue NG tube to low remittent suction.  NPO.  He may have ice chips and sips of clears.  Clamp trial with return of bowel function.  Patient scheduled for left wrist exploration with Ortho tomorrow.  Continue IV antibiotics.  Ambulate and up to chair.  DVT prophylax with heparin  GI prophylaxis with Protonix.        Quality:  DVT Mechanical Prophylaxis:   SCDs,        DVT Pharmacologic Prophylaxis   Medication    heparin (Porcine) 5000 UNIT/ML injection 5,000 Units                 Code Status: Full Code  Fischer: No urinary  catheter in place  Fischer Duration (in days):   Central line:    ALLI:            Christi Haynes PA-C  8/16/2024  12:56 PM        Addendum:     I have seen and examined the patient; I obtained and performed the above history, physical examination, assessment and plan.  I have I have edited the above to reflect my evaluation, opinion, and physical exam findings.     Exam and plan as above.     Unpacked and examined.  Care plan discussed with patient and nursing staff.  Trial of ice chips and sips of water until bowel function improves.  All questions answered.                MEDICATIONS ADMINISTERED IN LAST 1 DAY:  acetaminophen (Ofirmev) 10 mg/mL infusion premix 1,000 mg       Date Action Dose Route User    8/16/2024 0858 New Bag 1,000 mg Intravenous Nilda Izaguirre RN    8/15/2024 1732 New Bag 1,000 mg Intravenous Sara Encarnacion RN          heparin (Porcine) 5000 UNIT/ML injection 5,000 Units       Date Action Dose Route User    8/16/2024 1333 Given 5,000 Units Subcutaneous (Left Lower Abdomen) Nilda Izaguirre RN    8/16/2024 0449 Given 5,000 Units Subcutaneous (Left Lower Abdomen) Sujata Weiss RN    8/15/2024 2108 Given 5,000 Units Subcutaneous (Right Lower Abdomen) Sujata Weiss RN          ketorolac (Toradol) 15 MG/ML injection 15 mg       Date Action Dose Route User    8/16/2024 1605 Given 15 mg Intravenous Nilda Izaguirre RN    8/16/2024 0858 Given 15 mg Intravenous Nilda Izaguirre RN    8/16/2024 0449 Given 15 mg Intravenous Sujata Weiss RN    8/15/2024 2108 Given 15 mg Intravenous Sujata Weiss RN          LORazepam (Ativan) tab 1 mg       Date Action Dose Route User    8/15/2024 2319 Given 1 mg Oral Sujata Weiss RN          magnesium oxide (Mag-Ox) tab 400 mg       Date Action Dose Route User    8/16/2024 0908 Given 400 mg Oral Nilda Izaguirre RN          pantoprazole (Protonix) 40 mg in sodium chloride 0.9% PF 10 mL IV push       Date Action Dose Route User    8/16/2024 1333 Given 40 mg Intravenous  Nilda Izaguirre RN          piperacillin-tazobactam (Zosyn) 3.375 g in dextrose 5% 100 mL IVPB-ADDV       Date Action Dose Route User    8/16/2024 0449 New Bag 3.375 g Intravenous Sujata Weiss RN          piperacillin-tazobactam (Zosyn) 3.375 g in dextrose 5% 100 mL IVPB-ADDV       Date Action Dose Route User    8/16/2024 1605 New Bag 3.375 g Intravenous Nilda Izaguirre RN          sertraline (Zoloft) tab 50 mg       Date Action Dose Route User    8/16/2024 0857 Given 50 mg Oral Nilda Izaguirre RN          sodium chloride 0.9% infusion       Date Action Dose Route User    8/16/2024 0909 New Bag (none) Intravenous Nilda Izaguirre RN    8/15/2024 2325 New Bag (none) Intravenous Sujata Weiss RN          temazepam (Restoril) cap 15 mg       Date Action Dose Route User    8/15/2024 2108 Given 15 mg Oral Sujata Weiss RN          tetanus immune globulin (Hypertet) injection 250 Units       Date Action Dose Route User    8/16/2024 1333 Given 250 Units Intramuscular (Right Arm) Nilda Izaguirre RN            Vitals (last day)       Date/Time Temp Pulse Resp BP SpO2 Weight O2 Device O2 Flow Rate (L/min) [facility]    08/16/24 1623 98.7 °F (37.1 °C) 84 18 150/81 98 % -- None (Room air) 0 L/min     08/16/24 1206 97.6 °F (36.4 °C) 70 18 136/96 97 % -- None (Room air) 0 L/min     08/16/24 0823 98.2 °F (36.8 °C) 75 20 148/97 95 % -- None (Room air) 0 L/min     08/16/24 0456 98.2 °F (36.8 °C) 75 18 126/70 95 % -- None (Room air) 0 L/min HB    08/15/24 2105 98 °F (36.7 °C) 67 18 134/78 96 % -- None (Room air) 0 L/min HB    08/15/24 2044 -- -- -- -- -- 168 lb (76.2 kg) -- -- MK    08/15/24 1654 98.4 °F (36.9 °C) 66 25 131/82 96 % -- None (Room air) -- SF    08/15/24 1600 99.4 °F (37.4 °C) 68 23 136/83 93 % -- None (Room air) -- TE    08/15/24 1500 -- 73 31 131/81 94 % -- -- -- TE    08/15/24 1400 -- 72 25 127/71 93 % -- -- -- TE    08/15/24 1300 99 °F (37.2 °C) 79 20 106/77 90 % -- -- -- TE    08/15/24 1200 100.2 °F (37.9 °C) 75 29  138/64 92 % -- None (Room air) -- TE    08/15/24 1100 -- 70 28 130/80 94 % -- -- -- TE    08/15/24 1030 -- -- -- -- 93 % -- None (Room air) -- TE    08/15/24 1000 -- 70 24 134/72 92 % -- -- -- TE    08/15/24 0900 -- 73 29 123/80 93 % -- -- -- TE    08/15/24 0830 -- -- -- -- 95 % -- -- 1 L/min TE    08/15/24 0800 99.2 °F (37.3 °C) 69 26 119/68 93 % -- Nasal cannula 2 L/min TE    08/15/24 0700 -- 72 30 124/74 96 % -- -- -- HW    08/15/24 0600 -- 73 20 132/74 96 % -- -- -- HW    08/15/24 0500 -- 73 32 124/77 94 % -- -- -- HW    08/15/24 0400 98.7 °F (37.1 °C) 81 24 130/74 93 % -- Nasal cannula 2 L/min HW    08/15/24 0300 -- 78 19 125/74 93 % -- -- -- HW    08/15/24 0200 -- 81 25 141/78 93 % -- -- -- HW    08/15/24 0100 -- 77 20 139/83 93 % -- -- -- HW    08/15/24 0000 99 °F (37.2 °C) 78 24 142/85 94 % -- Nasal cannula -- HW          CIWA Scores (since admission)       None          Blood Transfusion Record       Product Unit Status Volume Start End            Transfuse RBC       24  [id number]  [id number] Completed 08/14/24 1501 350 mL 08/14/24 1341 08/14/24 1351       24  [id number]  [id number] Completed 08/14/24 1501 350 mL 08/14/24 1253 08/14/24 1303       24  [id number]  [id number] Completed 08/14/24 1501 350 mL 08/14/24 1239 08/14/24 1249

## 2024-08-16 NOTE — PLAN OF CARE
Patient is alert and oriented. Pleasant/ cooperating with care. On room air. Tele with NSR. Patient denies nausea. NGT to LIS. Secured at 60 cm. NGT output is brown in color. Voiding freely. Receiving IVF. Midline incision closed with staples-c/d/I/kuldip. Stab wound puncture sites x4 closed with skin glue-c/d/I/kuldip. One transverse incision closed with staples-c/d/I. NPO with ice chips and sips with meds. Call light within reach.

## 2024-08-17 ENCOUNTER — ANESTHESIA (OUTPATIENT)
Dept: SURGERY | Facility: HOSPITAL | Age: 59
End: 2024-08-17
Payer: COMMERCIAL

## 2024-08-17 LAB — MAGNESIUM SERPL-MCNC: 1.6 MG/DL (ref 1.6–2.6)

## 2024-08-17 PROCEDURE — 0LQ80ZZ REPAIR LEFT HAND TENDON, OPEN APPROACH: ICD-10-PCS | Performed by: ORTHOPAEDIC SURGERY

## 2024-08-17 PROCEDURE — 3E0T3BZ INTRODUCTION OF ANESTHETIC AGENT INTO PERIPHERAL NERVES AND PLEXI, PERCUTANEOUS APPROACH: ICD-10-PCS | Performed by: ANESTHESIOLOGY

## 2024-08-17 PROCEDURE — 01Q50ZZ REPAIR MEDIAN NERVE, OPEN APPROACH: ICD-10-PCS | Performed by: ORTHOPAEDIC SURGERY

## 2024-08-17 PROCEDURE — 76942 ECHO GUIDE FOR BIOPSY: CPT | Performed by: ANESTHESIOLOGY

## 2024-08-17 RX ORDER — ROPIVACAINE HYDROCHLORIDE 5 MG/ML
INJECTION, SOLUTION EPIDURAL; INFILTRATION; PERINEURAL AS NEEDED
Status: DISCONTINUED | OUTPATIENT
Start: 2024-08-17 | End: 2024-08-17 | Stop reason: SURG

## 2024-08-17 RX ORDER — HYDROMORPHONE HYDROCHLORIDE 1 MG/ML
0.2 INJECTION, SOLUTION INTRAMUSCULAR; INTRAVENOUS; SUBCUTANEOUS EVERY 5 MIN PRN
Status: DISCONTINUED | OUTPATIENT
Start: 2024-08-17 | End: 2024-08-17 | Stop reason: HOSPADM

## 2024-08-17 RX ORDER — GLYCOPYRROLATE 0.2 MG/ML
INJECTION, SOLUTION INTRAMUSCULAR; INTRAVENOUS AS NEEDED
Status: DISCONTINUED | OUTPATIENT
Start: 2024-08-17 | End: 2024-08-17 | Stop reason: SURG

## 2024-08-17 RX ORDER — ACETAMINOPHEN 500 MG
1000 TABLET ORAL ONCE AS NEEDED
Status: DISCONTINUED | OUTPATIENT
Start: 2024-08-17 | End: 2024-08-17 | Stop reason: HOSPADM

## 2024-08-17 RX ORDER — MEPERIDINE HYDROCHLORIDE 25 MG/ML
12.5 INJECTION INTRAMUSCULAR; INTRAVENOUS; SUBCUTANEOUS AS NEEDED
Status: DISCONTINUED | OUTPATIENT
Start: 2024-08-17 | End: 2024-08-17 | Stop reason: HOSPADM

## 2024-08-17 RX ORDER — NALOXONE HYDROCHLORIDE 0.4 MG/ML
80 INJECTION, SOLUTION INTRAMUSCULAR; INTRAVENOUS; SUBCUTANEOUS AS NEEDED
Status: DISCONTINUED | OUTPATIENT
Start: 2024-08-17 | End: 2024-08-17 | Stop reason: HOSPADM

## 2024-08-17 RX ORDER — ONDANSETRON 2 MG/ML
4 INJECTION INTRAMUSCULAR; INTRAVENOUS EVERY 6 HOURS PRN
Status: DISCONTINUED | OUTPATIENT
Start: 2024-08-17 | End: 2024-08-17 | Stop reason: HOSPADM

## 2024-08-17 RX ORDER — METOCLOPRAMIDE HYDROCHLORIDE 5 MG/ML
INJECTION INTRAMUSCULAR; INTRAVENOUS AS NEEDED
Status: DISCONTINUED | OUTPATIENT
Start: 2024-08-17 | End: 2024-08-17 | Stop reason: SURG

## 2024-08-17 RX ORDER — ONDANSETRON 2 MG/ML
INJECTION INTRAMUSCULAR; INTRAVENOUS AS NEEDED
Status: DISCONTINUED | OUTPATIENT
Start: 2024-08-17 | End: 2024-08-17 | Stop reason: SURG

## 2024-08-17 RX ORDER — ROCURONIUM BROMIDE 10 MG/ML
INJECTION, SOLUTION INTRAVENOUS AS NEEDED
Status: DISCONTINUED | OUTPATIENT
Start: 2024-08-17 | End: 2024-08-17 | Stop reason: SURG

## 2024-08-17 RX ORDER — HYDROCODONE BITARTRATE AND ACETAMINOPHEN 5; 325 MG/1; MG/1
1 TABLET ORAL ONCE AS NEEDED
Status: DISCONTINUED | OUTPATIENT
Start: 2024-08-17 | End: 2024-08-17 | Stop reason: HOSPADM

## 2024-08-17 RX ORDER — SODIUM CHLORIDE, SODIUM LACTATE, POTASSIUM CHLORIDE, CALCIUM CHLORIDE 600; 310; 30; 20 MG/100ML; MG/100ML; MG/100ML; MG/100ML
INJECTION, SOLUTION INTRAVENOUS CONTINUOUS
Status: DISCONTINUED | OUTPATIENT
Start: 2024-08-17 | End: 2024-08-17 | Stop reason: HOSPADM

## 2024-08-17 RX ORDER — MIDAZOLAM HYDROCHLORIDE 1 MG/ML
INJECTION INTRAMUSCULAR; INTRAVENOUS AS NEEDED
Status: DISCONTINUED | OUTPATIENT
Start: 2024-08-17 | End: 2024-08-17 | Stop reason: SURG

## 2024-08-17 RX ORDER — DEXAMETHASONE SODIUM PHOSPHATE 4 MG/ML
VIAL (ML) INJECTION AS NEEDED
Status: DISCONTINUED | OUTPATIENT
Start: 2024-08-17 | End: 2024-08-17 | Stop reason: SURG

## 2024-08-17 RX ORDER — SODIUM CHLORIDE, SODIUM LACTATE, POTASSIUM CHLORIDE, CALCIUM CHLORIDE 600; 310; 30; 20 MG/100ML; MG/100ML; MG/100ML; MG/100ML
INJECTION, SOLUTION INTRAVENOUS CONTINUOUS PRN
Status: DISCONTINUED | OUTPATIENT
Start: 2024-08-17 | End: 2024-08-17 | Stop reason: SURG

## 2024-08-17 RX ORDER — LABETALOL HYDROCHLORIDE 5 MG/ML
5 INJECTION, SOLUTION INTRAVENOUS EVERY 5 MIN PRN
Status: DISCONTINUED | OUTPATIENT
Start: 2024-08-17 | End: 2024-08-17 | Stop reason: HOSPADM

## 2024-08-17 RX ORDER — HYDROMORPHONE HYDROCHLORIDE 1 MG/ML
0.6 INJECTION, SOLUTION INTRAMUSCULAR; INTRAVENOUS; SUBCUTANEOUS EVERY 5 MIN PRN
Status: DISCONTINUED | OUTPATIENT
Start: 2024-08-17 | End: 2024-08-17 | Stop reason: HOSPADM

## 2024-08-17 RX ORDER — CEFAZOLIN SODIUM 1 G/3ML
INJECTION, POWDER, FOR SOLUTION INTRAMUSCULAR; INTRAVENOUS AS NEEDED
Status: DISCONTINUED | OUTPATIENT
Start: 2024-08-17 | End: 2024-08-17 | Stop reason: SURG

## 2024-08-17 RX ORDER — MAGNESIUM SULFATE HEPTAHYDRATE 40 MG/ML
2 INJECTION, SOLUTION INTRAVENOUS ONCE
Status: COMPLETED | OUTPATIENT
Start: 2024-08-17 | End: 2024-08-17

## 2024-08-17 RX ORDER — HYDROCODONE BITARTRATE AND ACETAMINOPHEN 5; 325 MG/1; MG/1
2 TABLET ORAL ONCE AS NEEDED
Status: DISCONTINUED | OUTPATIENT
Start: 2024-08-17 | End: 2024-08-17 | Stop reason: HOSPADM

## 2024-08-17 RX ORDER — PROCHLORPERAZINE EDISYLATE 5 MG/ML
5 INJECTION INTRAMUSCULAR; INTRAVENOUS EVERY 8 HOURS PRN
Status: DISCONTINUED | OUTPATIENT
Start: 2024-08-17 | End: 2024-08-17 | Stop reason: HOSPADM

## 2024-08-17 RX ORDER — HYDROMORPHONE HYDROCHLORIDE 1 MG/ML
0.4 INJECTION, SOLUTION INTRAMUSCULAR; INTRAVENOUS; SUBCUTANEOUS EVERY 5 MIN PRN
Status: DISCONTINUED | OUTPATIENT
Start: 2024-08-17 | End: 2024-08-17 | Stop reason: HOSPADM

## 2024-08-17 RX ADMIN — METOCLOPRAMIDE HYDROCHLORIDE 10 MG: 5 INJECTION INTRAMUSCULAR; INTRAVENOUS at 11:50:00

## 2024-08-17 RX ADMIN — GLYCOPYRROLATE 0.2 MG: 0.2 INJECTION, SOLUTION INTRAMUSCULAR; INTRAVENOUS at 11:50:00

## 2024-08-17 RX ADMIN — ONDANSETRON 4 MG: 2 INJECTION INTRAMUSCULAR; INTRAVENOUS at 11:50:00

## 2024-08-17 RX ADMIN — CEFAZOLIN SODIUM 2 G: 1 INJECTION, POWDER, FOR SOLUTION INTRAMUSCULAR; INTRAVENOUS at 11:50:00

## 2024-08-17 RX ADMIN — DEXAMETHASONE SODIUM PHOSPHATE 4 MG: 4 MG/ML VIAL (ML) INJECTION at 11:50:00

## 2024-08-17 RX ADMIN — ROCURONIUM BROMIDE 5 MG: 10 INJECTION, SOLUTION INTRAVENOUS at 11:47:00

## 2024-08-17 RX ADMIN — MIDAZOLAM HYDROCHLORIDE 2 MG: 1 INJECTION INTRAMUSCULAR; INTRAVENOUS at 11:37:00

## 2024-08-17 RX ADMIN — ROPIVACAINE HYDROCHLORIDE 30 ML: 5 INJECTION, SOLUTION EPIDURAL; INFILTRATION; PERINEURAL at 11:42:00

## 2024-08-17 RX ADMIN — SODIUM CHLORIDE, SODIUM LACTATE, POTASSIUM CHLORIDE, CALCIUM CHLORIDE: 600; 310; 30; 20 INJECTION, SOLUTION INTRAVENOUS at 11:32:00

## 2024-08-17 NOTE — PLAN OF CARE
A&Ox4. VSS. RA. . Denies chest pain and SOB.   GI: Abdomen soft, nondistended. Passing gas. LIS NGT to lt nare @ 60cm  Denies nausea.   : Voids.   No pain meds given during shift  Up with standby assist.   Drains: NGT to LIS @ 60cm  Incisions: Midline with staples (SOLOMON) C/D/I, transverse with staples (SOLOMON) C/D/I  Punture wounds x4 with skin glue (SOLOMON) C/D/I  Diet: NPO sips with meds/clears/ice chips  IVF running per order.   IV abx given  All appropriate safety measures in place. All questions and concerns addressed.

## 2024-08-17 NOTE — PROGRESS NOTES
[facility]  Progress Note    Kirby Conteh Patient Status:  Inpatient    1965 MRN [id number]   Location [facility] 3NW-A Attending Nikkie Chavez MD   Hosp Day # 3 PCP SALVATORE HILL,      Subjective:  The patient is resting comfortably in bed.  He reports flatus.  He denies a bowel movement.  NG tube is in place.  He reports mild incisional pain.  He reports weakness today.  He denies fever or chills.    Objective/Physical Exam:  General: Alert, orientated x3.  Cooperative.  No apparent distress.  Vital Signs:  Blood pressure 136/79, pulse 67, temperature 99.3 °F (37.4 °C), temperature source Oral, resp. rate 18, height 71\", weight 175 lb (79.4 kg), SpO2 97%.  Wt Readings from Last 3 Encounters:   24 175 lb (79.4 kg)     Lungs: No respiratory distress.  Cardiac: Regular rate and rhythm.   Abdomen:  Soft, non distended, non tender, with no rebound or guarding.  No peritoneal signs.   Extremities:  No lower extremity edema noted.    Incision: Clean, dry, intact, no erythema      Intake/Output:    Intake/Output Summary (Last 24 hours) at 2024 0827  Last data filed at 2024 0640  Gross per 24 hour   Intake 2944 ml   Output 1600 ml   Net 1344 ml     I/O last 3 completed shifts:  In: 5096 [P.O.:70; I.V.:5026]  Out: 3000 [Urine:1650; Emesis/NG output:1350]  No intake/output data recorded.    Medications:    magnesium sulfate  2 g Intravenous Once    piperacillin-tazobactam  3.375 g Intravenous Q8H    pantoprazole  40 mg Intravenous Q24H    sertraline  50 mg Oral Daily    buPROPion ER  150 mg Oral Daily    temazepam  15 mg Oral Nightly    heparin  5,000 Units Subcutaneous Q8H ROLY       Labs:        Lab Results   Component Value Date    INR 1.15 2024         Assessment  Patient Active Problem List   Diagnosis    Penetrating abdominal trauma, initial encounter    Arm laceration, left, initial encounter    Major depressive disorder, recurrent severe without psychotic features  (HCC)    Anxiety disorder    Insomnia     Postop day 3 exploratory laparotomy with evacuation of intra-abdominal hematoma, small bowel resection and repair of enterotomy  Suicide attempt  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  N.p.o.  Patient is scheduled for left wrist exploration today per Ortho.  Continue IV antibiotics.  Patient with reported bowel function overnight.  If he is doing well this afternoon after surgery, consider NG tube clamp trial  Ambulate and up to chair  DVT prophylaxis with heparin  GI prophylaxis with Protonix    Quality:  DVT Mechanical Prophylaxis:   SCDs,    DVT Pharmacologic Prophylaxis   Medication    heparin (Porcine) 5000 UNIT/ML injection 5,000 Units                Code Status: Full Code  Fischer: No urinary catheter in place  Fischer Duration (in days):   Central line:    ALLI:         Christi Haynes PA-C  8/17/2024  8:27 AM      Addendum:    I have seen and examined the patient; I obtained and performed the above history, physical examination, assessment and plan.  I have I have edited the above to reflect my evaluation, opinion, and physical exam findings.    Exam and plan as above.    Care plan discussed    I, Dr. [facility] Baldwin, personally performed the services described in this documentation  by Ms. Haynes, and they are both accurate and complete.    Edward Baldwin MD FACS  [facility] General Surgery

## 2024-08-17 NOTE — BRIEF OP NOTE
Pre-Operative Diagnosis: LEFT FOREARM MEDIAL NERVE AND TENDON LACERATION     Post-Operative Diagnosis: LEFT FOREARM MEDIAL NERVE AND TENDON LACERATION      Procedure Performed:   LEFT FOREARM WOUND EXPLORATION, REPAIR OF TENDON AND MEDIAN NERVE    Surgeons and Role:     * Ezio Jacobson MD - Primary    Assistant(s):        Surgical Findings: %, FDS MF 90%, RF FDS 10%, %, median nerve partial, cutaneous br palm lacerated.      Specimen: Non3     Estimated Blood Loss: Blood Output: 15 mL (8/17/2024  2:36 PM)      Dictation Number:  non    Ezio Jacobson MD  8/17/2024  3:17 PM

## 2024-08-17 NOTE — ANESTHESIA PROCEDURE NOTES
Regional Block    Date/Time: 8/17/2024 11:39 AM    Performed by: Francisco Arias MD  Authorized by: Francisco Arias MD      General Information and Staff    Start Time:  8/17/2024 11:39 AM  End Time:  8/17/2024 11:42 AM  Anesthesiologist:  Francisco Arias MD  Performed by:  Anesthesiologist  Patient Location:  OR    Block Placement: Pre Induction  Site Identification: real time ultrasound guided and image stored and retrievable    Block site/laterality marked before start: site marked  Reason for Block: at surgeon's request and post-op pain management    Preanesthetic Checklist: 2 patient identifers, IV checked, risks and benefits discussed, monitors and equipment checked, pre-op evaluation, timeout performed, anesthesia consent, sterile technique used, no prohibitive neurological deficits and no local skin infection at insertion site      Procedure Details    Patient Position:  Supine  Prep: ChloraPrep    Monitoring:  Cardiac monitor, continuous pulse ox and blood pressure cuff  Block Type:  Supraclavicular  Laterality:  Left  Injection Technique:  Single-shot    Needle    Needle Type:  Short-bevel and echogenic  Needle Gauge:  21 G  Needle Length:  50 mm  Needle Localization:  Ultrasound guidance  Reason for Ultrasound Use: appropriate spread of the medication was noted in real time and no ultrasound evidence of intravascular and/or intraneural injection            Assessment    Injection Assessment:  Good spread noted, negative resistance, negative aspiration for heme, incremental injection, low pressure, no pain on injection and local visualized surrounding nerve on ultrasound  Paresthesia Pain:  None  Heart Rate Change: No    - Patient tolerated block procedure well without evidence of immediate block related complications.     Medications  8/17/2024 11:39 AM      Additional Comments    Medication:  Ropivacaine 0.5% 30mL.

## 2024-08-17 NOTE — ANESTHESIA PROCEDURE NOTES
Airway  Date/Time: 8/17/2024 11:48 AM  Urgency: elective    Airway not difficult    General Information and Staff    Patient location during procedure: OR  Anesthesiologist: Francisco Arias MD  Performed: anesthesiologist   Performed by: Francisco Arias MD  Authorized by: Francisco Arias MD      Indications and Patient Condition  Indications for airway management: anesthesia  Spontaneous Ventilation: absent  Sedation level: deep  Preoxygenated: yes  Patient position: sniffing  Mask difficulty assessment: 0 - not attempted    Final Airway Details  Final airway type: endotracheal airway      Successful airway: ETT  Cuffed: yes   Successful intubation technique: direct laryngoscopy  Facilitating devices/methods: intubating stylet and cricoid pressure  Endotracheal tube insertion site: oral  Blade: Lorene  Blade size: #4  ETT size (mm): 7.5    Cormack-Lehane Classification: grade I - full view of glottis  Placement verified by: capnometry   Cuff volume (mL): 10  Measured from: lips  ETT to lips (cm): 23  Number of attempts at approach: 1  Ventilation between attempts: none  Number of other approaches attempted: 0    Additional Comments  PreO2.  Cricoid pressure maintained throughout.  Existing NGT placed on continuous suction.  RSI as noted.  Eyes taped.  DL x 1 with MAC 4, grade 1 view, atraumatic oral intubation ETT 7.5, +ETCO2, +BBS.  Taped and secured at 23 cm.

## 2024-08-17 NOTE — ANESTHESIA POSTPROCEDURE EVALUATION
[facility]    Kirby Conteh Patient Status:  Inpatient   Age/Gender 59 year old male MRN [id number]   Location [facility] SURGERY Attending Nikkie Chavez MD   Hosp Day # 3 PCP SALVATORE HILL DO       Anesthesia Post-op Note    LEFT FOREARM WOUND EXPLORATION, REPAIR OF TENDON AND MEDIAN NERVE    Procedure Summary       Date: 08/17/24 Room / Location:  MAIN OR 05 / [facility] MAIN OR    Anesthesia Start: 1132 Anesthesia Stop: 1518    Procedure: LEFT FOREARM WOUND EXPLORATION, REPAIR OF TENDON AND MEDIAN NERVE (Left) Diagnosis: (LEFT FOREARM MEDIAL NERVE AND TENDON LACERATION)    Surgeons: Ezio Jacobson MD Anesthesiologist: Francisco Arias MD    Anesthesia Type: general ASA Status: 2            Anesthesia Type: general    Vitals Value Taken Time   /90 08/17/24 1519   Temp 98.5 08/17/24 1519   Pulse 89 08/17/24 1519   Resp 18 08/17/24 1519   SpO2 93 08/17/24 1519       Patient Location: PACU    Anesthesia Type: general    Airway Patency: patent and extubated    Postop Pain Control: adequate    Mental Status: preanesthetic baseline    Nausea/Vomiting: none    Cardiopulmonary/Hydration status: stable euvolemic    Complications: no apparent anesthesia related complications    Postop vital signs: stable    Dental Exam: Unchanged from Preop    Patient to be discharged from PACU when criteria met.

## 2024-08-18 LAB
ALBUMIN SERPL-MCNC: 3.4 G/DL (ref 3.2–4.8)
ALBUMIN/GLOB SERPL: 1.7 {RATIO} (ref 1–2)
ALP LIVER SERPL-CCNC: 85 U/L
ALT SERPL-CCNC: 41 U/L
ANION GAP SERPL CALC-SCNC: 4 MMOL/L (ref 0–18)
AST SERPL-CCNC: 30 U/L (ref ?–34)
BASOPHILS # BLD AUTO: 0.03 X10(3) UL (ref 0–0.2)
BASOPHILS NFR BLD AUTO: 0.4 %
BILIRUB SERPL-MCNC: 0.6 MG/DL (ref 0.3–1.2)
BUN BLD-MCNC: 12 MG/DL (ref 9–23)
CALCIUM BLD-MCNC: 8.4 MG/DL (ref 8.7–10.4)
CHLORIDE SERPL-SCNC: 104 MMOL/L (ref 98–112)
CO2 SERPL-SCNC: 26 MMOL/L (ref 21–32)
CREAT BLD-MCNC: 0.76 MG/DL
EGFRCR SERPLBLD CKD-EPI 2021: 104 ML/MIN/1.73M2 (ref 60–?)
EOSINOPHIL # BLD AUTO: 0 X10(3) UL (ref 0–0.7)
EOSINOPHIL NFR BLD AUTO: 0 %
ERYTHROCYTE [DISTWIDTH] IN BLOOD BY AUTOMATED COUNT: 12.3 %
GLOBULIN PLAS-MCNC: 2 G/DL (ref 2–3.5)
GLUCOSE BLD-MCNC: 190 MG/DL (ref 70–99)
HCT VFR BLD AUTO: 30.4 %
HGB BLD-MCNC: 11 G/DL
IMM GRANULOCYTES # BLD AUTO: 0.05 X10(3) UL (ref 0–1)
IMM GRANULOCYTES NFR BLD: 0.7 %
LYMPHOCYTES # BLD AUTO: 1.4 X10(3) UL (ref 1–4)
LYMPHOCYTES NFR BLD AUTO: 19.7 %
MAGNESIUM SERPL-MCNC: 1.8 MG/DL (ref 1.6–2.6)
MCH RBC QN AUTO: 31.5 PG (ref 26–34)
MCHC RBC AUTO-ENTMCNC: 36.2 G/DL (ref 31–37)
MCV RBC AUTO: 87.1 FL
MONOCYTES # BLD AUTO: 0.73 X10(3) UL (ref 0.1–1)
MONOCYTES NFR BLD AUTO: 10.3 %
NEUTROPHILS # BLD AUTO: 4.91 X10 (3) UL (ref 1.5–7.7)
NEUTROPHILS # BLD AUTO: 4.91 X10(3) UL (ref 1.5–7.7)
NEUTROPHILS NFR BLD AUTO: 68.9 %
OSMOLALITY SERPL CALC.SUM OF ELEC: 283 MOSM/KG (ref 275–295)
PLATELET # BLD AUTO: 287 10(3)UL (ref 150–450)
POTASSIUM SERPL-SCNC: 3.2 MMOL/L (ref 3.5–5.1)
PROT SERPL-MCNC: 5.4 G/DL (ref 5.7–8.2)
RBC # BLD AUTO: 3.49 X10(6)UL
SODIUM SERPL-SCNC: 134 MMOL/L (ref 136–145)
WBC # BLD AUTO: 7.1 X10(3) UL (ref 4–11)

## 2024-08-18 PROCEDURE — 99254 IP/OBS CNSLTJ NEW/EST MOD 60: CPT | Performed by: HOSPITALIST

## 2024-08-18 RX ORDER — ACETAMINOPHEN 500 MG
500 TABLET ORAL EVERY 6 HOURS PRN
Status: DISCONTINUED | OUTPATIENT
Start: 2024-08-18 | End: 2024-08-21

## 2024-08-18 RX ORDER — POTASSIUM CHLORIDE 1500 MG/1
40 TABLET, EXTENDED RELEASE ORAL ONCE
Status: COMPLETED | OUTPATIENT
Start: 2024-08-18 | End: 2024-08-18

## 2024-08-18 RX ORDER — MAGNESIUM OXIDE 400 MG/1
400 TABLET ORAL ONCE
Status: COMPLETED | OUTPATIENT
Start: 2024-08-18 | End: 2024-08-18

## 2024-08-18 NOTE — PLAN OF CARE
Pt A&Ox4. VSS on room air. Telemetry monitoring - NSR. Care companion at bedside. NGT clamped this AM for 6 hour clamp trial. NPO with ice chips and sips. Incisions to abdomen C/D/I. IV zosyn q8. L arm with ace wrap and cast C/D/I. Sling in place. NWB to LLE. Pt reporting numbness to L fingers from nerve block. Voiding without difficulty. Pain controlled with PO medications. Safety precautions in place and pt reminded to \"call;don't fall.\" POC discussed with pt.     1630 - NGT removed. Pt started on clear liquid diet. Pt with complaints of new bruising to R arm. General surgery team paged to make aware. Spoke with general surgery PA and received orders to page hospitalist. Dr Guevara also paged.

## 2024-08-18 NOTE — PLAN OF CARE
Aox4. NG tube to LIS, minimal clear green output overnight. Denies nausea, denies flatus, denies belching.  Hypoactive bowel sounds. Abdominal incisions/lacerations  Nerve block to LUE diminishing, pt with weak LUE , increased sensation to extremities, capillary refill WNL, no pallor/paresthesia/swelling.     IV Ativan administered for insomnia with relief.     Care companion at bedside.   Endorsed plan of care to oncoming RN, pt and RN agreeable to plan of care.

## 2024-08-18 NOTE — PROGRESS NOTES
[facility]  Progress Note    Kirby Conteh Patient Status:  Inpatient    1965 MRN [id number]   Location [facility] 3NW-A Attending Nikkie Chavez MD   Hosp Day # 4 PCP SALVATORE HILL, DO     Subjective:  Patient resting comfortably in bed.  He states that he is regularly passing flatus.  No bowel movement yet.  Denies any nausea or vomiting.  NG remains in place.  Reports incisional pain is mild and well-controlled.  He is hoping to ambulate more around the unit today.  He denies any fevers or chills.    Objective/Physical Exam:  General: Alert, orientated x3.  Cooperative.  No apparent distress.  Vital Signs:  Blood pressure 133/83, pulse 69, temperature 99.2 °F (37.3 °C), temperature source Oral, resp. rate 21, height 71\", weight 175 lb (79.4 kg), SpO2 95%.  Lungs: No respiratory distress.  Cardiac: Regular rate and rhythm.   Abdomen:  Soft, non distended, mild incisional tenderness, with no rebound or guarding.  No peritoneal signs.   Extremities:  No lower extremity edema noted.    Incisions: Clean, dry, intact, no erythema      Labs:        Lab Results   Component Value Date    INR 1.15 2024       I/O last 3 completed shifts:  In: 5049 [P.O.:120; I.V.:4929]  Out: 3115 [Urine:2100; Emesis/NG output:1000; Blood:15]  I/O this shift:  In: 0   Out: 300 [Urine:300]    Assessment  Patient Active Problem List   Diagnosis    Penetrating abdominal trauma, initial encounter    Arm laceration, left, initial encounter    Major depressive disorder, recurrent severe without psychotic features (HCC)    Anxiety disorder    Insomnia       POD 4 exploratory laparotomy with evacuation of intra-abdominal hematoma, small bowel resection and repair of enterotomy  Suicide attempt  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  Will trial clamping NG today.  Clamp NG x 6 hours, if no nausea or vomiting, and residuals less than 200 cc, may  discontinue.  If NG successfully removed, may start clear liquid diet  Continue IV antibiotics  Analgesia and antiemetics as needed  Ambulate and up to chair  DVT prophylaxis with heparin  GI prophylaxis with Protonix      Marybeth Garcia PA-C  8/18/2024  11:23 AM     Addendum:    I have seen and examined the patient; I obtained and performed the above history, physical examination, assessment and plan.  I have I have edited the above to reflect my evaluation, opinion, and physical exam findings.    Exam and plan as above.    Care plan discussed.  Initiate discharge planning for disposition once patient medically suitable for discharge    I, Dr. Edward Baldwin, personally performed the services described in this documentation  by Ms Radha PA-C, and they are both accurate and complete.    Edward Baldwin MD FACS  [facility]

## 2024-08-18 NOTE — PROGRESS NOTES
The patient is alert and oriented x 4 post surgery, and his vital signs are stable. Nasogastric tube placed back on low intermittent suction per order. Left Supraclavicular nerve block in effect, patient able to shrug his shoulders. He states that he is numb from his left shoulder down to his fingertips. Patient unable to wiggle his fingers on his left hand.
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
## 2024-08-19 ENCOUNTER — TELEPHONE (OUTPATIENT)
Dept: SURGERY | Facility: CLINIC | Age: 59
End: 2024-08-19

## 2024-08-19 ENCOUNTER — APPOINTMENT (OUTPATIENT)
Dept: ULTRASOUND IMAGING | Facility: HOSPITAL | Age: 59
End: 2024-08-19
Attending: INTERNAL MEDICINE
Payer: COMMERCIAL

## 2024-08-19 ENCOUNTER — APPOINTMENT (OUTPATIENT)
Dept: ULTRASOUND IMAGING | Facility: HOSPITAL | Age: 59
DRG: 958 | End: 2024-08-19
Attending: INTERNAL MEDICINE
Payer: COMMERCIAL

## 2024-08-19 LAB
MAGNESIUM SERPL-MCNC: 1.6 MG/DL (ref 1.6–2.6)
POTASSIUM SERPL-SCNC: 3.8 MMOL/L (ref 3.5–5.1)

## 2024-08-19 PROCEDURE — 93971 EXTREMITY STUDY: CPT | Performed by: INTERNAL MEDICINE

## 2024-08-19 PROCEDURE — 99232 SBSQ HOSP IP/OBS MODERATE 35: CPT | Performed by: INTERNAL MEDICINE

## 2024-08-19 PROCEDURE — 99232 SBSQ HOSP IP/OBS MODERATE 35: CPT | Performed by: OTHER

## 2024-08-19 RX ORDER — CLONAZEPAM 1 MG/1
1 TABLET ORAL NIGHTLY PRN
Status: DISCONTINUED | OUTPATIENT
Start: 2024-08-19 | End: 2024-08-20

## 2024-08-19 RX ORDER — PANTOPRAZOLE SODIUM 40 MG/1
40 TABLET, DELAYED RELEASE ORAL
Status: DISCONTINUED | OUTPATIENT
Start: 2024-08-20 | End: 2024-08-21

## 2024-08-19 RX ORDER — CLONAZEPAM 0.5 MG/1
0.5 TABLET ORAL 2 TIMES DAILY PRN
Status: DISCONTINUED | OUTPATIENT
Start: 2024-08-19 | End: 2024-08-21

## 2024-08-19 RX ORDER — QUETIAPINE FUMARATE 50 MG/1
50 TABLET, EXTENDED RELEASE ORAL NIGHTLY
Status: DISCONTINUED | OUTPATIENT
Start: 2024-08-19 | End: 2024-08-21
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
## 2024-08-19 NOTE — PROGRESS NOTES
[facility]  Progress Note    Kriby Conteh Patient Status:  Inpatient    1965 MRN [id number]   [facility] 3NW-A Attending Nikkie Chavez MD   Hosp Day # 5 PCP SALVATORE HILL, DO     Subjective:  Patient states he is overall feeling much better today with removal of NG yesterday.  He is tolerating clear liquids without nausea or vomiting.  He is passing flatus and has had 1 bowel movement.  He notes bruising to his right arm, for which she is getting an ultrasound of today.  He otherwise denies any acute complaints today and states his pain is well-controlled.  He was able to ambulate around the unit yesterday without difficulty.  Denies any fevers or chills.    Objective/Physical Exam:  General: Alert, orientated x3.  Cooperative.  No apparent distress.  Vital Signs:  Blood pressure (!) 144/92, pulse 65, temperature 98.8 °F (37.1 °C), temperature source Oral, resp. rate 20, height 71\", weight 167 lb 1.6 oz (75.8 kg), SpO2 98%.  Lungs: No respiratory distress.  Cardiac: Regular rate and rhythm.   Abdomen:  Soft, non distended, mild incisional tenderness, with no rebound or guarding.  No peritoneal signs.   Extremities:  No lower extremity edema noted.    Incisions: Clean, dry, intact, no erythema      Labs:  Lab Results   Component Value Date    WBC 7.1 2024    HGB 11.0 2024    HCT 30.4 2024    .0 2024     Lab Results   Component Value Date     2024    K 3.8 2024     2024    CO2 26.0 2024    BUN 12 2024    CREATSERUM 0.76 2024     2024    CA 8.4 2024    ALKPHO 85 2024    ALT 41 2024    AST 30 2024    BILT 0.6 2024    ALB 3.4 2024    TP 5.4 2024     Lab Results   Component Value Date    INR 1.15 2024       I/O last 3 completed shifts:  In: 3269 [P.O.:120; I.V.:3149]  Out: 1875 [Urine:1375; Emesis/NG output:500]  I/O this shift:  In: -   Out: 250  [Urine:250]    Assessment  Patient Active Problem List   Diagnosis    Penetrating abdominal trauma, initial encounter    Arm laceration, left, initial encounter    Major depressive disorder, recurrent severe without psychotic features (HCC)    Anxiety disorder    Insomnia       POD 4 exploratory laparotomy with evacuation of intra-abdominal hematoma, small bowel resection and repair of enterotomy  Suicide attempt  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  He has had return of bowel function.  Advance to full liquid diet today.  Bruising to right arm noted.  Patient undergoing ultrasound today.  Continue IV antibiotics  Analgesia and antiemetics as needed  Activity as tolerated  DVT prophylaxis with heparin  GI prophylaxis with Protonix  Begin discharge planning.  Awaiting bed at inpatient psych      Marybeth Garcia PA-C  8/19/2024  8:55 AM   Patient overall feels well.  He advancing to full liquid diet today.  Denies nausea or vomiting.  Physical examination-incisions clean, dry, staples intact  He does obtain adequate pain relief through oral analgesics.  Encourage ambulation.  Patient to be discharged to psychiatric inpatient care  I agree with the note above and attest to its accuracy with the following changes below after my interview and examination of the patient    The patient was seen and examined.  Available labs and radiology is noted.    Nikkie Chavez MD FACS    Please note that this report has been produced using speech recognition software and may contain errors related to that system including but not limited to errors in grammar, punctuation and spelling as well as words and phrases that possibly may have been recognized inappropriately.  If there are any questions or concerns please contact the dictating provider for clarification.    The 21st Century Cures Act makes medical notes like these available to patients in the interest of  trans parency. Please be advised this is a medical document. Medical documents are intended to carry relevant information, facts as evident, and the clinical opinion of the practitioner. The medical note is intended as peer to peer communication and may appear blunt or direct. It is written in medical language and may contain abbreviations or verbiage that are unfamiliar.   Again if there are any questions or concerns please contact the dictating provider for clarification.

## 2024-08-19 NOTE — PLAN OF CARE
Neuro: A&Ox4, VSS, RA, , IS. Denies cough, chest pain, and SOB.   Telemetry: Normal sinus rhythm   GI: Abdomen soft, passing gas and belching. Denies nausea. Last BM was 8/18.   : Voids freely in bathroom.   Pt denies pain and pain medication.   Up ad taylor with sitter to standby.   Incisions: Midline with staples is CDI. Other small incisions are CDI with gauze and tape. L are with sling and ace wrap is intact, no drainage.   Diet: Tolerating full liquid diet.    IVF running per order through [facility].   All appropriate safety measures in place. All questions and concerns addressed. Bed locked and in lowest position. Call light in reach.   Suicide precautions.
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
## 2024-08-19 NOTE — TELEPHONE ENCOUNTER
Received records and pathology from [facility] to coordinate a MOHs.  I called and left a voicemail for the patient to call back so that we can get him scheduled.  Currently holding 10/1 for reconstruction and 9/26 for MOHs with [facility].

## 2024-08-19 NOTE — PROGRESS NOTES
Patient alert and oriented x4. On room air. Lung sounds clear bilaterally. . Tele- NSR. Abdomen flat, non-distended. Wound dressings changed with 4x4 and tape. Incisions with staples cleaned with CHG wipes. Passing gas. Denies nausea. Bruising noted on right forearm. US doppler to be done on right forearm the AM. Left arm wrapped and in sling. To bear no weight on left arm.  IV fluids infusing per order. Sitter in room with patient. All questions and concerns addressed. All appropriate safety measures in place.
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
## 2024-08-20 PROBLEM — F41.1 GAD (GENERALIZED ANXIETY DISORDER): Status: ACTIVE | Noted: 2024-08-15

## 2024-08-20 LAB
ANION GAP SERPL CALC-SCNC: 3 MMOL/L (ref 0–18)
BASOPHILS # BLD AUTO: 0.05 X10(3) UL (ref 0–0.2)
BASOPHILS NFR BLD AUTO: 0.7 %
BUN BLD-MCNC: 6 MG/DL (ref 9–23)
CALCIUM BLD-MCNC: 9.2 MG/DL (ref 8.7–10.4)
CHLORIDE SERPL-SCNC: 106 MMOL/L (ref 98–112)
CO2 SERPL-SCNC: 30 MMOL/L (ref 21–32)
CREAT BLD-MCNC: 0.82 MG/DL
EGFRCR SERPLBLD CKD-EPI 2021: 101 ML/MIN/1.73M2 (ref 60–?)
EOSINOPHIL # BLD AUTO: 0.34 X10(3) UL (ref 0–0.7)
EOSINOPHIL NFR BLD AUTO: 4.7 %
ERYTHROCYTE [DISTWIDTH] IN BLOOD BY AUTOMATED COUNT: 12.5 %
GLUCOSE BLD-MCNC: 96 MG/DL (ref 70–99)
HCT VFR BLD AUTO: 32.9 %
HGB BLD-MCNC: 11.6 G/DL
IMM GRANULOCYTES # BLD AUTO: 0.06 X10(3) UL (ref 0–1)
IMM GRANULOCYTES NFR BLD: 0.8 %
LYMPHOCYTES # BLD AUTO: 2.01 X10(3) UL (ref 1–4)
LYMPHOCYTES NFR BLD AUTO: 27.9 %
MCH RBC QN AUTO: 31.4 PG (ref 26–34)
MCHC RBC AUTO-ENTMCNC: 35.3 G/DL (ref 31–37)
MCV RBC AUTO: 88.9 FL
MONOCYTES # BLD AUTO: 0.81 X10(3) UL (ref 0.1–1)
MONOCYTES NFR BLD AUTO: 11.3 %
NEUTROPHILS # BLD AUTO: 3.93 X10 (3) UL (ref 1.5–7.7)
NEUTROPHILS # BLD AUTO: 3.93 X10(3) UL (ref 1.5–7.7)
NEUTROPHILS NFR BLD AUTO: 54.6 %
OSMOLALITY SERPL CALC.SUM OF ELEC: 285 MOSM/KG (ref 275–295)
PLATELET # BLD AUTO: 310 10(3)UL (ref 150–450)
POTASSIUM SERPL-SCNC: 3.8 MMOL/L (ref 3.5–5.1)
RBC # BLD AUTO: 3.7 X10(6)UL
SODIUM SERPL-SCNC: 139 MMOL/L (ref 136–145)
WBC # BLD AUTO: 7.2 X10(3) UL (ref 4–11)

## 2024-08-20 PROCEDURE — 99232 SBSQ HOSP IP/OBS MODERATE 35: CPT | Performed by: INTERNAL MEDICINE

## 2024-08-20 PROCEDURE — 99232 SBSQ HOSP IP/OBS MODERATE 35: CPT | Performed by: OTHER

## 2024-08-20 RX ORDER — CLONAZEPAM 1 MG/1
1 TABLET ORAL NIGHTLY
Status: DISCONTINUED | OUTPATIENT
Start: 2024-08-20 | End: 2024-08-21

## 2024-08-20 RX ORDER — DIPHENHYDRAMINE HYDROCHLORIDE 50 MG/ML
25 INJECTION INTRAMUSCULAR; INTRAVENOUS EVERY 6 HOURS PRN
Status: DISCONTINUED | OUTPATIENT
Start: 2024-08-20 | End: 2024-08-21

## 2024-08-20 NOTE — PROGRESS NOTES
[facility]  Report of Psychiatric Progress Note    Kirby Conteh Patient Status:  Inpatient    1965 MRN [id number]   Location [facility] 4SW-A Attending Nikkie Chavez MD   Hosp Day # 6 PCP SALVATORE HILL DO     Date of Admission: 2024  Date of Service: 2024  Reason for Consultation: Suicide attempt    Impression:  Psychiatric Diagnoses:  Suicide attempt by cutting his left wrist and stabbing himself in the abdomen on 24. He was just hospitalized at [facility] psych unit in [place] from 24 to 24. Contributing factors for suicide attempt include: 1) recent medical issues (basal cell skin CA, ear infection; concern for a neurologic condition causing Rt arm and leg numbness/tingling) increasing his anxiety 2) severe insomnia and 3) feeling ineffective at work because he can't concentrate and focus.     Major depressive disorder, recurrent, severe, without psychotic features. He has no delusions or hallucinations at this time.     Generalized anxiety disorder.     Insomnia unspecified, likely related to depression.      Rule Out Diagnoses:  Bipolar 2 depression.     Personality Diagnosis:  OCPD traits.     Pertinent Medical Diagnoses:  Self-inflicted abdominal wound s/p ex-lap and evacuation of intra-abdominal hematoma, small bowel resection, and repair of enterotomy on 24. No Rt UE DVT per US on 24.     Recommendations:  1) Continue Seroquel XL 50mg nightly to help with insomnia and obsessiveness. Started on 24.    2) Schedule Klonipin 1mg nightly to help with insomnia and anxiety. 1mg PRN dose helped him with sleep and anxiety last night.     3) Continue Klonipin 0.5mg po twice a day PRN anxiety.     4) Continue Wellbutrin XL 150mg po daily for depression. This was started in [facility] psych unit on 24.    5) Continue Zoloft 50mg po daily to help with anxiety and depression. He took it for a few days before he was admitted to the psych unit on  8/5/24. Discussed using both Zoloft and Wellbutrin to treat his severe depression at this time and he is in agreement. Will titrate up.     6) Continue suicidal precautions and 1:1 sitter. He is medically clear and will be transferred to [facility] tomorrow 8/21/24.     Enrique Weldon MD    History of Present Illness:  58 yo male with recurrent major depression and hx anxiety was admitted on 8/14/24 after attempting suicide by cutting his left wrist and stabbing himself in the abdomen on 8/13/24.     Per hx from sister, he has a hx of recurrent depression that started in his 20's. She recalls him endorsing sadness, low motivation, apathy, and a lack of enjoyment in life during these episodes. He was very depressed after his divorce 15 yrs ago but was still able to function and work. She is not aware of him having suicidal ideation until the past month.     About 1 month ago, he called her complaining of many neurologic symptoms like numbness and tingling and his neck and arms and legs. He c/o headache and sinus fullness and was diagnosed with an ear infection. 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 He was very anxious about having a physical illness and c/o insomnia. He endorsed \"mental deterioration\" and feeling depressed and helpless and hopeless. She encouraged him to seek help. He saw his primary MD 2 wks ago and was prescribed Temazepam at 7.5mg nightly which did not help with sleep or anxiety. He was prescribed Zoloft which she doesn't think he started (note- he did take it for a few days per his report). Due to his worsening anxiety and depression, she had him visit her in [facility]. During his visit with her, he admitted to having suicidal ideation. She brought him to the local ED where he was certified for inpatient psych treatment. There were no psych beds in [facility] so he was transferred to [facility] psych unit in [place] on 8/5/2024. He was treated with Wellbutrin XL 150mg  daily for depression and Xanax 0.25mg QID titrated up to 0.5mg QID for anxiety and Restoril 15mg nightly for insomnia. He would call her and appeared paranoid, talking about how he felt locked up in the psych unit. He no longer had suicidal ideation and was discharged home on 8/13/24. Sister's  had to have a medical procedure, so she was unable to pick him up and drive him home. The psych unit set up a cab for him to go home.     Today, sister visited him and found him in the bathtub with the lacerated wrist and the self-inflicted abdominal wound. She called 911 immediately and he was brought to the ED. He went to the OR today and is s/p ex-lap and evacuation of intra-abdominal hematoma, small bowel resection, and repair of enterotomy. He remains intubated.  Sister is not aware of any specific triggers or new stressors contributing to the suicide attempt.    Interval Hx:  8/15/24- He shares how he has felt depressed the past 2 months. Initially, he developed physical symptoms including numbness/tingling of his neck and right arm and bilateral legs. This scared him. He also c/o sinus fullness and hearing a pulsating sound. He was diagnosed with an ear infection and treated with antibiotics. He also had a skin exam that revealed basal cell cancer. He needs to have it excised. He had xrays of his neck and back and was told he had some arthritis. He was scheduled to get a C spine MRI.     At baseline, he describes a hx of generalized anxiety. His anxiety increased with the medical issues. He was ruminating and worrying about having a serious illness. He began having troubling sleeping, both falling and staying asleep. This caused him to feel more tired and have trouble concentrating. He felt ineffective at work. He felt more depressed and overwhelmed and helpless and hopeless.     He began having passive suicidal thoughts about 1 month ago. He shared this with his sister on 8/4/24 and was admitted to the psych  unit on 8/5/25 as mentioned above. He didn't like it there. He didn't get much from the group therapy. He thought many of the patients were there because they were homeless and needed a place to stay. He only met with the  once during the 8 days and really wanted individual psychotherapy. The psychiatrist started Restoril/Xanax/Wellbutrin which did help with sleep. He still felt anxious and depressed and helpless and hopeless. He wanted to die to escape the dread and anxiety of living. He told staff that he was no longer suicidal so he could leave. He had a plan to discharge and overdose on the Restoril but they didn't give him any. He thought about overdosing on Ibuprofen or Aspirin. He looked it up on the internet and realized that it doesn't work. So he impulsively took a knife and cut his wrist and stabbed himself in the abdomen to kill himself. The pain stopped him from cutting deeper. He denied using any substances. He laid in the bathtub and waited to bleed out and die. He felt sad about leaving his 15 yr old daughter, but \"relieved\" at the thought of not having to suffer anymore.     When asked how he feels about being alive now, he is ambivalent. He doesn't want to leave his daughter but he doesn't want to be alive. He has passive suicidal ideation at this time.    8/16/2024- He fell asleep with the Restoril at 15mg nightly, but had difficulty staying asleep. He received Ativan 1mg IV at 2319 that helped him sleep. He feels anxious and depressed and regretful. He goes back to that day he cut and stabbed himself. He felt numb at the time. He is processing the \"dark labyrinth\" of his state of mind at that time.     He has been anxious and ruminating a lot today. He is worried about work and his daughter. He feels like he failed his family, his daughter, his coworkers. He is anxious about unexplained physical symptoms including right arm numbness and tingling that keeps coming back. He c/o  tinnitis which his ENT doc is aware of it. He thinks about the upcoming left hand surgery tomorrow and how he caused it. He struggles with everything ahead, in terms of both physical and mental recovery. He continues to have some hopeless feelings and passive suicidal ideation.    8/17/2024- He had left hand surgery, repair of tendon and median nerve.    8/19/2024- Restoril has helped him fall asleep but he has been having trouble staying asleep at night. He has been receiving Ativan IV PRN insomnia. He feels anxious and ruminates about his health issues with his left hand and needing a cast. He worries about the recent right arm tingling and whether that signifies something is neurologically wrong. He has no numbness or tingling of his Rt arm at this time. He has depressed mood, low motivation, guilty feelings, and anhedonia. He denies suicidal ideation at this time and wants to live for his daughter.    He has been asking himself what happened and why he attempted suicide. He isn't sure. He felt a loss of control that terrified him and he no longer wanted to feel anxious and helpless and hopeless. He felt sad and thought he would miss his daughter, but wanted relief from the negative feelings. He sat in the tub for over 12 hrs after he stabbed himself and drained the tub several times. He felt numb and detached.    8/20/2024- He had a Rt UE ultrasound last night that showed no DVT. He fell asleep with the Seroquel, but was woken up for the US. He received Klonipin 1mg PRN and it helped him sleep about 4 hrs last night. He feels tired, anxious, and depressed. He is ruminating constantly. He is anxious about going to the psych unit and wondering how he will get assistance dressing and how groups will be and when he can use the phone. He is worried about getting better physically and emotionally. He is worried about work and when he will get back to work. He is thinking about his daughter and how he is going to  relate to her after the suicide attempt. She is the most important person to him. He DENIES suicidal ideation and wants to live for his daughter. He is anxious about the whole process- discussed how realistically he will be in the inpatient psych unit for 1-2 weeks and then PHP/IOP for several weeks.    Regarding obsessive thinking, he denies any hx of OCD symptoms preventing him from functioning. 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      He shared how he has had generalized anxiety and excessive worrying most of his life. It may not show externally (unless it is someone that knows him well like his sister), but he feels it internally.     Regarding the intermittent Rt arm numbness and tingling, it is associated with anxiety, and no workup was indicated per general surgery and hospitalist services. C-spine MRI will need to be performed in the outpatient setting.     Past Psychiatric History:  1) Major depressive disorder, recurrent. He had his first depressive episode in his 20's. About 15 yrs ago, he had an episode when he was going through a divorce. Hx of suicidal ideation but no known attempts per hx from sister.     2) Anxiety unspecified.     Substance Use History: None per sister.    Psych Family History: Mother with life long hx of depression. One psychiatrist thought his mother had Bipolar 2 disorder.     Social and Developmental History:  with a 15 yr old daughter. 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 Patient's sister Miguelina [phone] is his main support.     Past Medical History:    Anxiety    Depression     History reviewed. No pertinent surgical history.  History reviewed. No pertinent family history.   reports that he has never smoked. He has never used smokeless tobacco. He reports current alcohol use. He  reports that he does not use drugs.    Allergies:  No Known Allergies    Medications:    Current Facility-Administered Medications:     diphenhydrAMINE (Benadryl) 50 mg/mL  injection 25 mg, 25 mg, Intravenous, Q6H PRN    pantoprazole (Protonix) DR tab 40 mg, 40 mg, Oral, QAM AC    QUEtiapine ER (SEROquel XR) 24 hr tab 50 mg, 50 mg, Oral, Nightly    clonazePAM (KlonoPIN) tab 0.5 mg, 0.5 mg, Oral, BID PRN    clonazePAM (KlonoPIN) tab 1 mg, 1 mg, Oral, Nightly PRN    acetaminophen (Tylenol Extra Strength) tab 500 mg, 500 mg, Oral, Q6H PRN    piperacillin-tazobactam (Zosyn) 3.375 g in dextrose 5% 100 mL IVPB-ADDV, 3.375 g, Intravenous, Q8H    oxyCODONE immediate release tab 5 mg, 5 mg, Oral, Q4H PRN    sertraline (Zoloft) tab 50 mg, 50 mg, Oral, Daily    buPROPion ER (Wellbutrin XL) 24 hr tab 150 mg, 150 mg, Oral, Daily    sodium chloride 0.9% infusion, , Intravenous, Continuous    heparin (Porcine) 5000 UNIT/ML injection 5,000 Units, 5,000 Units, Subcutaneous, Q8H ROLY    HYDROmorphone (Dilaudid) 1 MG/ML injection 0.2 mg, 0.2 mg, Intravenous, Q2H PRN **OR** HYDROmorphone (Dilaudid) 1 MG/ML injection 0.4 mg, 0.4 mg, Intravenous, Q2H PRN **OR** HYDROmorphone (Dilaudid) 1 MG/ML injection 0.8 mg, 0.8 mg, Intravenous, Q2H PRN    ondansetron (Zofran) 4 MG/2ML injection 4 mg, 4 mg, Intravenous, Q6H PRN    prochlorperazine (Compazine) 10 MG/2ML injection 5 mg, 5 mg, Intravenous, Q8H PRN    Review of Systems   Psychiatric/Behavioral:  Positive for depression and suicidal ideas. Negative for hallucinations and substance abuse. The patient is nervous/anxious and has insomnia.      Mental Status Exam:     Objective      Vitals:    08/20/24 1155   BP: 138/77   Pulse: 68   Resp: 20   Temp: 98 °F (36.7 °C)     Appearance: fair grooming  Behavior: cooperative  Gait: N/A    Speech: fluent    Mood: anxious and depressed  Affect: congruent    Thought process: linear  Thought content: no hallucinations or delusions    Orientation: self,  hospital, month, year, situation  Attention and Concentration: fair  Memory: intact remote and recent  Language: able to name and repeat  Fund of Knowledge: able to recite name of current [place] president    Insight: fair now  Judgment: POOR regarding suicide attempt, fair in accepting mental health treatment at this time    Laboratory Data:  Lab Results   Component Value Date    WBC 7.2 08/20/2024    HGB 11.6 08/20/2024    HCT 32.9 08/20/2024    .0 08/20/2024    CREATSERUM 0.82 08/20/2024    BUN 6 08/20/2024     08/20/2024    K 3.8 08/20/2024     08/20/2024    CO2 30.0 08/20/2024    GLU 96 08/20/2024    CA 9.2 08/20/2024

## 2024-08-20 NOTE — PROGRESS NOTES
Patient alert and oriented x4. On room air. . Vital signs stable. Tele- NSR. Denies shortness of breath and chest pain. Abdomen soft, non-distended. Incisions clean dry and intact. Left arm wrapped. Right arm bruising and redness. US doppler of right arm done this evening. Conclusion does not show DVT. Voids in bathroom. Sitter at bedside. Up with standby. Tolerating full liquid diet. IV fluids infusing per order. IV Zosyn. All questions and concerns addressed. All appropriate safety measures in place.   0300: Rash on back. Paged hospitalist. IV benadryl ordered PRN

## 2024-08-20 NOTE — CM/SW NOTE
Psych liaison Corazon will get pt a bed tomorrow at [facility] once pt finishes his IV ABX today and tolerates a soft diet for the rest of the day.

## 2024-08-20 NOTE — PROGRESS NOTES
[facility]  Progress Note    Kirby Conteh Patient Status:  Inpatient    1965 MRN [id number]   Location [facility] 3NW-A Attending Nikkie Chavez MD   Hosp Day # 6 PCP SALVATORE HILL, DO     Subjective:  The patient is resting in bed. He reports feeling well today. He is tolerating a full liquid diet without nausea or vomiting. He reports bowel function and passing flatus. He reports ambulating the halls a few times yesterday.     He believes he has a bed sore on his upper back. Per the patient's nurse, around 3 AM, the patient started to complain of erythematous, pruritic rash on his upper back. Benadryl was ordered, but he refused it.    Right arm ultrasound was negative for DVT yesterday.     Objective/Physical Exam:  General: Alert, orientated x3.  Cooperative.  No apparent distress.  Vital Signs:  Blood pressure 137/88, pulse 69, temperature 97.7 °F (36.5 °C), temperature source Oral, resp. rate 19, height 71\", weight 167 lb 1.6 oz (75.8 kg), SpO2 94%.  HEENT: Normocephalic, atraumatic. No scleral icterus.  Abdomen:  Soft, non-distended, non-tender, with no rebound or guarding.  No peritoneal signs.  Incision: Dry, clean, and intact with staples in place. No surrounding erythema or drainage. No signs of infection.  Skin: upper back with erythematous and pruritic rash.      Labs:  CBC:    Lab Results   Component Value Date    WBC 7.2 2024    WBC 7.1 2024    WBC 8.8 2024     Lab Results   Component Value Date    HGB 11.6 (L) 2024    HGB 11.0 (L) 2024    HGB 11.4 (L) 2024      Lab Results   Component Value Date    .0 2024    .0 2024    .0 2024         Assessment/Plan:  Patient Active Problem List   Diagnosis    Penetrating abdominal trauma, initial encounter    Arm laceration, left, initial encounter    Major depressive disorder, recurrent severe without psychotic features (HCC)    Anxiety disorder    Insomnia      POD 6 exploratory laparotomy with evacuation of intraabdominal hematoma, small bowel resection and repair of enterotomy  Suicide attempt  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  Continue IV antibiotics.  Continue wound care. Staples to be removed POD 14.  Recommend Benadryl as needed for rash and ice packs as needed to help with itching.   Appreciate ortho and psych recommendations.  Continue pain control and antiemetics as needed.  Encourage ambulation and up to chair.  DVT prophylaxis with heparin  GI prophylaxis with Protonix  Left median nerve injury management per ortho. Potential splinting prior to discharge.  The patient is stable for discharge to inpatient psych from a surgical standpoint once tolerating a soft diet and cleared by psych.     KALA Wheat  8/20/2024  8:59 AM     Overall patient reports that he continues to improve and feel better.  He is passing flatus and did have small bowel movement.  He will advance to soft diet today.    Further managemen laceration left wrist per hand surgery   patient is awaiting placement for inpatient psych.  BMP within normal limits.  Hemoglobin mildly diminished 11.6 but stable.  WBC within normal limits    I agree with the note above and attest to its accuracy with the following changes below after my interview and examination of the patient    The patient was seen and examined.  Available labs and radiology is noted.    Nikkie Chavez MD FACS    Please note that this report has been produced using speech recognition software and may contain errors related to that system including but not limited to errors in grammar, punctuation and spelling as well as words and phrases that possibly may have been recognized inappropriately.  If there are any questions or concerns please contact the dictating provider for clarification.    The 21st Century Cures Act makes medical notes like these available to patients in the  interest of trans parency. Please be advised this is a medical document. Medical documents are intended to carry relevant information, facts as evident, and the clinical opinion of the practitioner. The medical note is intended as peer to peer communication and may appear blunt or direct. It is written in medical language and may contain abbreviations or verbiage that are unfamiliar.   Again if there are any questions or concerns please contact the dictating provider for clarification.

## 2024-08-20 NOTE — PROGRESS NOTES
[facility]   part of [facility]     Hospitalist Progress Note     Kirby Conteh Patient Status:  Inpatient    1965 MRN [id number]   [facility] 3NW-A Attending Nikkie Chavez MD   Hosp Day # 6 PCP SALVATORE HILL DO     Reason for consult: medical management  Requested by: Nikkie Chavez MD      Subjective:     Resting in bed, normal bowel movement earlier this morning. Has been up ambulating without issues. No complaints     Objective:    Review of Systems:   A comprehensive review of systems was completed; pertinent positive and negatives stated in subjective.    Vital signs:  Temp:  [97.5 °F (36.4 °C)-99 °F (37.2 °C)] 97.7 °F (36.5 °C)  Pulse:  [60-80] 69  Resp:  [19-31] 19  BP: (133-159)/(76-90) 137/88  SpO2:  [93 %-97 %] 94 %    Physical Exam:    General: No acute distress  Respiratory: No wheezes, no rhonchi  Cardiovascular: S1, S2, regular rate and rhythm  Abdomen: Soft, Non-tender, non-distended, positive bowel sounds; surgical incision with staples c/d/i  Neuro: No new focal deficits.   Extremities: No edema    Diagnostic Data:    Labs:  Recent Labs   Lab 08/14/24  1119 08/14/24  2037 08/15/24  0443 08/16/24  0527 08/18/24  1959 08/20/24  0656   WBC 24.4* 17.9* 14.5* 8.8 7.1 7.2   HGB 14.7 14.3 13.4 11.4* 11.0* 11.6*   MCV 87.8 87.3 88.8 89.0 87.1 88.9   .0 295.0 270.0 223.0 287.0 310.0   INR 1.15  --   --   --   --   --        Recent Labs   Lab 08/14/24  2037 08/15/24  0443 08/16/24  0527 08/18/24  1959 08/19/24  0524  0656   * 124* 101* 190*  --  96   BUN 26* 23 17 12  --  6*   CREATSERUM 1.72* 1.23 0.72 0.76  --  0.82   CA 8.4* 8.5* 8.6* 8.4*  --  9.2   ALB 3.6 3.5  --  3.4  --   --    * 136 136 134*  --  139   K 4.2 4.6 3.9 3.2* 3.8 3.8    106 107 104  --  106   CO2 20.0* 24.0 25.0 26.0  --  30.0   ALKPHO 45 45  --  85  --   --    AST 38* 32  --  30  --   --    ALT 36 35  --  41  --   --    BILT 2.1* 1.2  --  0.6  --   --    TP  5.6* 5.4*  --  5.4*  --   --        Estimated Creatinine Clearance: 103.3 mL/min (based on SCr of 0.82 mg/dL).    Recent Labs   Lab 08/14/24  1119   *       Recent Labs   Lab 08/14/24  1119   PTP 14.8   INR 1.15        Microbiology    No results found for this visit on 08/14/24.      Imaging: Reviewed in Epic.    Medications:    pantoprazole  40 mg Oral QAM AC    QUEtiapine ER  50 mg Oral Nightly    piperacillin-tazobactam  3.375 g Intravenous Q8H    sertraline  50 mg Oral Daily    buPROPion ER  150 mg Oral Daily    heparin  5,000 Units Subcutaneous Q8H [facility]       Assessment & Plan:      #suicide attempt w/ stab wounds/lacerations to abdomen and L wrist  #Major depressive disorder  #Anxiety  - psych following  - suicide precautions/bedside sitter  - continue sertraline, bupropion, temazepam   - will discharge to inpatient psych   - stable for discharge to inpatient psych unit once cleared by other services      #Abdominal stab wounds  -s/p ex lap, evac of abdominal hematoma, small bowel resection, and repair of enterotomy  - empiric zosyn  - pain control, anti-emetics PRN  - general surgery primary     #L wrist lacteration  - s/p L forearm repair of tendon and median nerve  - ortho following     #RUE bruising and some erythema at the wrist  - RUE venous doppler negative for DVT      Thalia Alexander,     Supplementary Documentation:     Quality:  DVT Mechanical Prophylaxis:   SCDs,    DVT Pharmacologic Prophylaxis   Medication    heparin (Porcine) 5000 UNIT/ML injection 5,000 Units                Code Status: Full Code  Fischer: No urinary catheter in place    The 21st Century Cures Act makes medical notes like these available to patients in the interest of transparency. Please be advised this is a medical document. Medical documents are intended to carry relevant information, facts as evident, and the clinical opinion of the practitioner. The medical note is intended as peer to peer communication and may  appear blunt or direct. It is written in medical language and may contain abbreviations or verbiage that are unfamiliar.

## 2024-08-20 NOTE — PLAN OF CARE
Neuro: A&Ox4, VSS, RA, , IS. Denies cough, chest pain, and SOB.   Telemetry: Normal sinus rhythm   GI: Abdomen soft, passing gas and belching. Denies nausea.   : Voids freely in bathroom.   Pt denies pain.   Up ad taylor in room and hallway. Sitter with pt at all times.   Incisions: L arm in sling and ace bandage is CDI. Midline with staples and smaller abd incisions are CDI.   Diet: Tolerating soft diet.   IVF running per order through [facility].    All appropriate safety measures in place. All questions and concerns addressed. Bed locked and in lowest position. Call light in reach.   Plan to DC to [facility] tomorrow

## 2024-08-20 NOTE — PROGRESS NOTES
Patient medically cleared for inpatient psych at [facility]. Admission scheduled for tomorrow if bed available; per transfer RN at [facility]: \"They would like him to tolerate soft diet for a day and complete IV Zosyn antibiotics for today. They let me know we can reinitiate tomorrow as long as he is tolerating a soft diet\".     Psych Liaison to follow up in the morning to initiate transfer for inpatient psych at [facility].

## 2024-08-21 ENCOUNTER — OFFICE VISIT (OUTPATIENT)
Dept: WOUND CARE | Facility: HOSPITAL | Age: 59
End: 2024-08-21
Attending: Other
Payer: COMMERCIAL

## 2024-08-21 VITALS
RESPIRATION RATE: 16 BRPM | HEIGHT: 71 IN | BODY MASS INDEX: 23.4 KG/M2 | HEART RATE: 77 BPM | WEIGHT: 167.13 LBS | DIASTOLIC BLOOD PRESSURE: 86 MMHG | SYSTOLIC BLOOD PRESSURE: 144 MMHG | TEMPERATURE: 98 F | OXYGEN SATURATION: 96 %

## 2024-08-21 PROBLEM — F32.9 MDD (MAJOR DEPRESSIVE DISORDER): Status: ACTIVE | Noted: 2024-08-21

## 2024-08-21 LAB — SARS-COV-2 RNA RESP QL NAA+PROBE: NOT DETECTED

## 2024-08-21 PROCEDURE — 99232 SBSQ HOSP IP/OBS MODERATE 35: CPT | Performed by: OTHER

## 2024-08-21 RX ORDER — CLONAZEPAM 0.5 MG/1
0.5 TABLET ORAL 2 TIMES DAILY PRN
Status: SHIPPED | COMMUNITY
Start: 2024-08-21 | End: 2024-09-06

## 2024-08-21 RX ORDER — CLONAZEPAM 1 MG/1
1 TABLET ORAL NIGHTLY
Status: SHIPPED | COMMUNITY
Start: 2024-08-21 | End: 2024-09-06

## 2024-08-21 RX ORDER — LORAZEPAM 0.5 MG/1
0.5 TABLET ORAL ONCE
Status: COMPLETED | OUTPATIENT
Start: 2024-08-21 | End: 2024-08-21

## 2024-08-21 RX ORDER — BUPROPION HYDROCHLORIDE 150 MG/1
150 TABLET ORAL DAILY
Status: SHIPPED | COMMUNITY
Start: 2024-08-21 | End: 2024-09-06

## 2024-08-21 RX ORDER — QUETIAPINE FUMARATE 50 MG/1
50 TABLET, EXTENDED RELEASE ORAL NIGHTLY
Status: SHIPPED | COMMUNITY
Start: 2024-08-21 | End: 2024-09-06

## 2024-08-21 NOTE — PLAN OF CARE
Patient alert and oriented x4, vital signs stable on room air. With sitter. Mild pain reported. Up ad taylor to void. Probable discharge tomorrow. Safety measures in place, questions addressed, plan of care discussed with patient.

## 2024-08-21 NOTE — DISCHARGE INSTRUCTIONS
Eat a heart healthy diet  Stay well hydrated  No strenuous activities  No lifting anything over 10#'s until your follow up appointment with general surgery            Cecilia Navarro PA -  8/21/24 10:34 AM  Okay to shower daily and wash incision sites with soap and water.   Pat incision sites dry.   Do not need to put a dry dressing over the incisions. It is common to see a small amount of drainage.   Staple should be removed in 7 days, by SAEID  No further dressings required after removed            It has been a pleasure taking care of you!  Best wishes for a speedy recovery,  Conchita RN

## 2024-08-21 NOTE — DIETARY NOTE
[facility]   part of [facility]   CLINICAL NUTRITION    Kirby Conteh     Admitting diagnosis:  Penetrating abdominal trauma, initial encounter [S31.109A]  Arm laceration, left, initial encounter [S41.112A]    Ht: 180.3 cm (5' 11\")  Wt: 75.8 kg (167 lb 1.6 oz).   Body mass index is 23.31 kg/m².    Wt Readings from Last 6 Encounters:   08/19/24 75.8 kg (167 lb 1.6 oz)      Labs/Meds reviewed    Diet:       Procedures    Low Fiber/Soft diet Low Fiber/Soft; Is Patient on Accuchecks? No; Is Patient on Suicide Precautions? Yes     Percent Meals Eaten (last 3 days)       Date/Time Percent Meals Eaten (%)    08/19/24 0914 100 %    08/19/24 1302 100 %    08/19/24 1918 100 %    08/20/24 1155 100 %    08/20/24 1837 100 %          Pt chart reviewed d/t LOS. Pt is s/p ex lap and repair for self inflicted stab wounds.   Patient with good appetite at this time.  Nursing notes reports Percent Meals Eaten (%): 100 % intake for last meal.  Tolerating po diet without diarrhea, emesis, or constipation.   No significant weight changes noted.     Patient is at low nutrition risk at this time.    Please consult if patient status changes or nutrition issues arise.    Chelly Pickering RD, LDN  Clinical Dietitian           [General Appearance - Alert] : alert [General Appearance - In No Acute Distress] : in no acute distress [General Appearance - Well Nourished] : well nourished [General Appearance - Well-Appearing] : healthy appearing [General Appearance - Well Developed] : well developed [Cranial Nerves Optic (II)] : visual acuity intact bilaterally,  pupils equal round and reactive to light [Cranial Nerves Oculomotor (III)] : extraocular motion intact [Cranial Nerves Trigeminal (V)] : facial sensation intact symmetrically [Cranial Nerves Facial (VII)] : face symmetrical [Cranial Nerves Vestibulocochlear (VIII)] : hearing was intact bilaterally [Cranial Nerves Glossopharyngeal (IX)] : tongue and palate midline [Cranial Nerves Accessory (XI - Cranial And Spinal)] : head turning and shoulder shrug symmetric [Cranial Nerves Hypoglossal (XII)] : there was no tongue deviation with protrusion [Motor Tone] : muscle tone was normal in all four extremities [Motor Strength] : muscle strength was normal in all four extremities [Abnormal Walk] : normal gait [Balance] : balance was intact [1+] : Patella left 1+ [Able to toe walk] : the patient was able to toe walk [Able to heel walk] : the patient was able to heel walk [FreeTextEntry2] : tenderness to palpation on the lower left paraspinal process

## 2024-08-21 NOTE — PROGRESS NOTES
Attempted to see patient.  Chart reviewed, noted plans for discharge.  Okay for inpatient psychiatry once cleared by all teams and arranged.  Discussed with RN  Krishna ASHRAF

## 2024-08-21 NOTE — PROGRESS NOTES
Sending covid test to lab @ this time.  Reports minimal pain & declines pain meds @ this time.  Breakfast ordered, waiting for delivery

## 2024-08-21 NOTE — PROGRESS NOTES
[facility]  Report of Psychiatric Progress Note    Kirby Conteh Patient Status:  Inpatient    1965 MRN [id number]   [facility] 4SW-A Attending Nikkie Chavez MD   Hosp Day # 7 PCP SALVATORE HILL DO     Date of Admission: 2024  Date of Service: 2024  Reason for Consultation: Suicide attempt    Impression:  Psychiatric Diagnoses:  Suicide attempt by cutting his left wrist and stabbing himself in the abdomen on 24. He was just hospitalized at [facility] psych unit in [place] from 24 to 24. Contributing factors for suicide attempt include: 1) recent medical issues (basal cell skin CA, ear infection; concern for a neurologic condition causing Rt arm and leg numbness/tingling) increasing his anxiety 2) severe insomnia and 3) feeling ineffective at work because he can't concentrate and focus.     Major depressive disorder, recurrent, severe, without psychotic features. He has no delusions or hallucinations at this time.     Generalized anxiety disorder.     Insomnia unspecified, likely related to depression.      Rule Out Diagnoses:  Bipolar 2 depression.     Personality Diagnosis:  OCPD traits.     Pertinent Medical Diagnoses:  Self-inflicted abdominal wound s/p ex-lap and evacuation of intra-abdominal hematoma, small bowel resection, and repair of enterotomy on 24. No Rt UE DVT per US on 24.     Recommendations:  1) Continue Seroquel XL 50mg nightly to help with insomnia and obsessiveness. Started on 24.    2) Continue Klonipin 1mg nightly to help with insomnia and anxiety.     3) Continue Klonipin 0.5mg po twice a day PRN anxiety.     4) Continue Wellbutrin XL 150mg po daily for depression. This was started in [facility] psych unit on 24.    5) Continue Zoloft 50mg po daily to help with anxiety and depression. He took it for a few days before he was admitted to the psych unit on 24. Discussed using both Zoloft and Wellbutrin to treat  his severe depression at this time and he is in agreement. Will titrate up.     6) Continue suicidal precautions and 1:1 sitter. He is medically clear and will be transferred to [facility] today.    Enrique Weldon MD    History of Present Illness:  58 yo male with recurrent major depression and hx anxiety was admitted on 8/14/24 after attempting suicide by cutting his left wrist and stabbing himself in the abdomen on 8/13/24.     Per hx from sister, he has a hx of recurrent depression that started in his 20's. She recalls him endorsing sadness, low motivation, apathy, and a lack of enjoyment in life during these episodes. He was very depressed after his divorce 15 yrs ago but was still able to function and work. She is not aware of him having suicidal ideation until the past month.     About 1 month ago, he called her complaining of many neurologic symptoms like numbness and tingling and his neck and arms and legs. He c/o headache and sinus fullness and was diagnosed with an ear infection. 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 He was very anxious about having a physical illness and c/o insomnia. He endorsed \"mental deterioration\" and feeling depressed and helpless and hopeless. She encouraged him to seek help. He saw his primary MD 2 wks ago and was prescribed Temazepam at 7.5mg nightly which did not help with sleep or anxiety. He was prescribed Zoloft which she doesn't think he started (note- he did take it for a few days per his report). Due to his worsening anxiety and depression, she had him visit her in [place]. During his visit with her, he admitted to having suicidal ideation. She brought him to the local ED where he was certified for inpatient psych treatment. There were no psych beds in [place] so he was transferred to [facility] psych unit in [place] on 8/5/2024. He was treated with Wellbutrin XL 150mg daily for depression and Xanax 0.25mg QID titrated up to 0.5mg QID for  anxiety and Restoril 15mg nightly for insomnia. He would call her and appeared paranoid, talking about how he felt locked up in the psych unit. He no longer had suicidal ideation and was discharged home on 8/13/24. Sister's  had to have a medical procedure, so she was unable to pick him up and drive him home. The psych unit set up a cab for him to go home.     Today, sister visited him and found him in the bathtub with the lacerated wrist and the self-inflicted abdominal wound. She called 911 immediately and he was brought to the ED. He went to the OR today and is s/p ex-lap and evacuation of intra-abdominal hematoma, small bowel resection, and repair of enterotomy. He remains intubated.  Sister is not aware of any specific triggers or new stressors contributing to the suicide attempt.    Interval Hx:  8/15/24- He shares how he has felt depressed the past 2 months. Initially, he developed physical symptoms including numbness/tingling of his neck and right arm and bilateral legs. This scared him. He also c/o sinus fullness and hearing a pulsating sound. He was diagnosed with an ear infection and treated with antibiotics. He also had a skin exam that revealed basal cell cancer. He needs to have it excised. He had xrays of his neck and back and was told he had some arthritis. He was scheduled to get a C spine MRI.     At baseline, he describes a hx of generalized anxiety. His anxiety increased with the medical issues. He was ruminating and worrying about having a serious illness. He began having troubling sleeping, both falling and staying asleep. This caused him to feel more tired and have trouble concentrating. He felt ineffective at work. He felt more depressed and overwhelmed and helpless and hopeless.     He began having passive suicidal thoughts about 1 month ago. He shared this with his sister on 8/4/24 and was admitted to the psych unit on 8/5/25 as mentioned above. He didn't like it there. He didn't get  much from the group therapy. He thought many of the patients were there because they were homeless and needed a place to stay. He only met with the  once during the 8 days and really wanted individual psychotherapy. The psychiatrist started Restoril/Xanax/Wellbutrin which did help with sleep. He still felt anxious and depressed and helpless and hopeless. He wanted to die to escape the dread and anxiety of living. He told staff that he was no longer suicidal so he could leave. He had a plan to discharge and overdose on the Restoril but they didn't give him any. He thought about overdosing on Ibuprofen or Aspirin. He looked it up on the internet and realized that it doesn't work. So he impulsively took a knife and cut his wrist and stabbed himself in the abdomen to kill himself. The pain stopped him from cutting deeper. He denied using any substances. He laid in the bathtub and waited to bleed out and die. He felt sad about leaving his 15 yr old daughter, but \"relieved\" at the thought of not having to suffer anymore.     When asked how he feels about being alive now, he is ambivalent. He doesn't want to leave his daughter but he doesn't want to be alive. He has passive suicidal ideation at this time.    8/16/2024- He fell asleep with the Restoril at 15mg nightly, but had difficulty staying asleep. He received Ativan 1mg IV at 2319 that helped him sleep. He feels anxious and depressed and regretful. He goes back to that day he cut and stabbed himself. He felt numb at the time. He is processing the \"dark labyrinth\" of his state of mind at that time.     He has been anxious and ruminating a lot today. He is worried about work and his daughter. He feels like he failed his family, his daughter, his coworkers. He is anxious about unexplained physical symptoms including right arm numbness and tingling that keeps coming back. He c/o tinnitis which his ENT doc is aware of it. He thinks about the upcoming left hand  surgery tomorrow and how he caused it. He struggles with everything ahead, in terms of both physical and mental recovery. He continues to have some hopeless feelings and passive suicidal ideation.    8/17/2024- He had left hand surgery, repair of tendon and median nerve.    8/19/2024- Restoril has helped him fall asleep but he has been having trouble staying asleep at night. He has been receiving Ativan IV PRN insomnia. He feels anxious and ruminates about his health issues with his left hand and needing a cast. He worries about the recent right arm tingling and whether that signifies something is neurologically wrong. He has no numbness or tingling of his Rt arm at this time. He has depressed mood, low motivation, guilty feelings, and anhedonia. He denies suicidal ideation at this time and wants to live for his daughter.    He has been asking himself what happened and why he attempted suicide. He isn't sure. He felt a loss of control that terrified him and he no longer wanted to feel anxious and helpless and hopeless. He felt sad and thought he would miss his daughter, but wanted relief from the negative feelings. He sat in the tub for over 12 hrs after he stabbed himself and drained the tub several times. He felt numb and detached.    8/20/2024- He had a Rt UE ultrasound last night that showed no DVT. He fell asleep with the Seroquel, but was woken up for the US. He received Klonipin 1mg PRN and it helped him sleep about 4 hrs last night. He feels tired, anxious, and depressed. He is ruminating constantly. He is anxious about going to the psych unit and wondering how he will get assistance dressing and how groups will be and when he can use the phone. He is worried about getting better physically and emotionally. He is worried about work and when he will get back to work. He is thinking about his daughter and how he is going to relate to her after the suicide attempt. She is the most important person to him. He  DENIES suicidal ideation and wants to live for his daughter. He is anxious about the whole process- discussed how realistically he will be in the inpatient psych unit for 1-2 weeks and then PHP/IOP for several weeks.    Regarding obsessive thinking, he denies any hx of OCD symptoms preventing him from functioning. 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      He shared how he has had generalized anxiety and excessive worrying most of his life. It may not show externally (unless it is someone that knows him well like his sister), but he feels it internally.     Regarding the intermittent Rt arm numbness and tingling, it is associated with anxiety, and no workup was indicated per general surgery and hospitalist services. C-spine MRI will need to be performed in the outpatient setting.     8/21/2024- He feels anxious and depressed. He ruminates. He is worried about what it will be like at [facility]- the schedule, phone privileges, amount of physical rehab he will get, how the psychotherapy groups will be run, etc. He is worried about the future and how he will recover physically and emotionally and how he will get back to work and how things will be with his daughter. Discussed how he will get further psych med adjustments and psychotherapy in the psych unit. Discussed how he will benefit from learning coping skills including breathing and relaxation and mindfulness exercises.     Past Psychiatric History:  1) Major depressive disorder, recurrent. He had his first depressive episode in his 20's. About 15 yrs ago, he had an episode when he was going through a divorce. Hx of suicidal ideation but no known attempts per hx from sister.     2) Anxiety unspecified.     Substance Use History: None per sister.    Psych Family History: Mother with life long hx of  depression. One psychiatrist thought his mother had Bipolar 2 disorder.     Social and Developmental History:  with a 15 yr old daughter. 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 Patient's sister Miguelina [phone] is his main support.     Past Medical History:    Anxiety    Depression     History reviewed. No pertinent surgical history.  History reviewed. No pertinent family history.   reports that he has never smoked. He has never used smokeless tobacco. He reports current alcohol use. He reports that he does not use drugs.    Allergies:  No Known Allergies    Medications:    Current Facility-Administered Medications:     diphenhydrAMINE (Benadryl) 50 mg/mL  injection 25 mg, 25 mg, Intravenous, Q6H PRN    clonazePAM (KlonoPIN) tab 1 mg, 1 mg, Oral, Nightly    pantoprazole (Protonix) DR tab 40 mg, 40 mg, Oral, QAM AC    QUEtiapine ER (SEROquel XR) 24 hr tab 50 mg, 50 mg, Oral, Nightly    clonazePAM (KlonoPIN) tab 0.5 mg, 0.5 mg, Oral, BID PRN    acetaminophen (Tylenol Extra Strength) tab 500 mg, 500 mg, Oral, Q6H PRN    oxyCODONE immediate release tab 5 mg, 5 mg, Oral, Q4H PRN    sertraline (Zoloft) tab 50 mg, 50 mg, Oral, Daily    buPROPion ER (Wellbutrin XL) 24 hr tab 150 mg, 150 mg, Oral, Daily    sodium chloride 0.9% infusion, , Intravenous, Continuous    heparin (Porcine) 5000 UNIT/ML injection 5,000 Units, 5,000 Units, Subcutaneous, Q8H ROLY    HYDROmorphone (Dilaudid) 1 MG/ML injection 0.2 mg, 0.2 mg, Intravenous, Q2H PRN **OR** HYDROmorphone (Dilaudid) 1 MG/ML injection 0.4 mg, 0.4 mg, Intravenous, Q2H PRN **OR** HYDROmorphone (Dilaudid) 1 MG/ML injection 0.8 mg, 0.8 mg, Intravenous, Q2H PRN    ondansetron (Zofran) 4 MG/2ML injection 4 mg, 4 mg, Intravenous, Q6H PRN    prochlorperazine (Compazine) 10 MG/2ML injection 5 mg, 5 mg, Intravenous, Q8H PRN    Review of Systems   Psychiatric/Behavioral:  Positive for depression and suicidal ideas. Negative for hallucinations and  substance abuse. The patient is nervous/anxious and has insomnia.      Mental Status Exam:     Objective      Vitals:    08/21/24 0903   BP: 137/84   Pulse: 68   Resp: 24   Temp: 98.7 °F (37.1 °C)     Appearance: fair grooming  Behavior: cooperative  Gait: N/A    Speech: fluent    Mood: anxious and depressed  Affect: congruent    Thought process: linear  Thought content: no hallucinations or delusions    Orientation: self, hospital, month, year, situation  Attention and Concentration: fair  Memory: intact remote and recent  Language: able to name and repeat  Fund of Knowledge: able to recite name of current [place] president    Insight: fair now  Judgment: POOR regarding suicide attempt, fair in accepting mental health treatment at this time

## 2024-08-21 NOTE — PROGRESS NOTES
[facility]  Progress Note    Kirby Conteh Patient Status:  Inpatient    1965 MRN [id number]   Location [facility] 3NW-A Attending Nikkie Chavez MD   Hosp Day # 7 PCP SALVATORE HILL, DO     Subjective:  The patient is resting in bed eating breakfast. He denies new complaints. He denies abdominal pain, nausea, or vomiting. He continues to have bowel function.    Objective/Physical Exam:  General: Alert, orientated x3.  Cooperative.  No apparent distress.  Vital Signs:  Blood pressure 137/84, pulse 68, temperature 98.7 °F (37.1 °C), temperature source Oral, resp. rate 24, height 71\", weight 167 lb 1.6 oz (75.8 kg), SpO2 97%.  HEENT: Normocephalic, atraumatic. No scleral icterus.  Abdomen:  Soft, non-distended, non-tender, with no rebound or guarding.  No peritoneal signs.  Incision: Dry, clean, and intact with staples in place. No surrounding erythema or drainage. No signs of infection.    Labs:  CBC:    Lab Results   Component Value Date    WBC 7.2 2024    WBC 7.1 2024    WBC 8.8 2024     Lab Results   Component Value Date    HGB 11.6 (L) 2024    HGB 11.0 (L) 2024    HGB 11.4 (L) 2024      Lab Results   Component Value Date    .0 2024    .0 2024    .0 2024       Assessment/Plan:  Patient Active Problem List   Diagnosis    Penetrating abdominal trauma, initial encounter    Arm laceration, left, initial encounter    Major depressive disorder, recurrent severe without psychotic features (HCC)    NOEMI (generalized anxiety disorder)    Insomnia     POD 7 exploratory laparotomy with evacuation of intraabdominal hematoma, small bowel resection and repair of enterotomy  Suicide attempt  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   Continue diet as tolerated.  Continue pain control and antiemetics as  needed.  Encourage ambulation and up to chair.  Follow up with Ortho as recommended.   Discharge instructions discussed with the patient.   Staples to be removed in 7 days at facility.  He is to follow up with [facility] General surgery in 2 weeks and Dr. Chavez in 1 month.    KALA Wheat  8/21/2024  9:45 AM     Patient is tolerating soft diet.  Has had bowel movement.  No further indications from general surgical standpoint.  Patient may be discharged to inpatient psychiatric facility from general surgical standpoint.  Surgical staples may be removed 14 days after surgery.  We did discuss limitation of excessive lifting, pushing or pulling status post laparotomy.  The patient has no further questions at this time.    I agree with the note above and attest to its accuracy with the following changes below after my interview and examination of the patient    The patient was seen and examined.  Available labs and radiology is noted.    Nikkie Chavez MD FACS    Please note that this report has been produced using speech recognition software and may contain errors related to that system including but not limited to errors in grammar, punctuation and spelling as well as words and phrases that possibly may have been recognized inappropriately.  If there are any questions or concerns please contact the dictating provider for clarification.    The 21st Century Cures Act makes medical notes like these available to patients in the interest of trans parency. Please be advised this is a medical document. Medical documents are intended to carry relevant information, facts as evident, and the clinical opinion of the practitioner. The medical note is intended as peer to peer communication and may appear blunt or direct. It is written in medical language and may contain abbreviations or verbiage that are unfamiliar.   Again if there are any questions or concerns please contact the dictating provider for clarification.

## 2024-08-21 NOTE — PROGRESS NOTES
Patient has been accepted to [facility] under Dr. Ibarra for inpatient psychiatric treatment. Voluntary completed by discharge planner Wili and patient. PCS form completed. Transport 3PM.

## 2024-08-21 NOTE — PLAN OF CARE
Pt discharged to [facility].   Copy of AVS given to pt.   Report given to nurse 'Jason'  Denies c/o pain  Left unit stable via cart & ambulance drivers x 2

## 2024-08-21 NOTE — PROGRESS NOTES
Call placed to [facility] to give nurse to nurse report.  The person who answered stated the nurse will call me back.   Direct spectra link number given. Awaiting return call.

## 2024-08-22 PROCEDURE — 99214 OFFICE O/P EST MOD 30 MIN: CPT

## 2024-08-23 ENCOUNTER — TELEPHONE (OUTPATIENT)
Dept: SURGERY | Facility: CLINIC | Age: 59
End: 2024-08-23

## 2024-08-23 ENCOUNTER — TELEPHONE (OUTPATIENT)
Facility: CLINIC | Age: 59
End: 2024-08-23

## 2024-08-23 PROBLEM — C44.219 BASAL CELL CARCINOMA (BCC) OF LEFT EAR: Status: ACTIVE | Noted: 2024-08-23

## 2024-08-23 NOTE — TELEPHONE ENCOUNTER
Please schedule the patient to see me on Friday. 2:15pm. The patient currently schedule on that slot is going to reschedule.    Daily dressing changes with gauze and either kerlix/ace wrap. Incisions need to stay dry until follow up. DERIK.

## 2024-08-23 NOTE — PAYOR COMM NOTE
--------------  DISCHARGE REVIEW    Payor: [facility]  Subscriber #:  [id number]  Authorization Number: [id number]    Admit date: 8/14/24  Admit time:   2:46 PM  Discharge Date: 8/21/2024  3:15 PM     Admitting Physician: Mehul Romero DO  Attending Physician:  No att. providers found  Primary Care Physician: Bib Cole DO       Discharge Summary Notes    No notes of this type exist for this encounter.         REVIEWER COMMENTS
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
## 2024-08-23 NOTE — TELEPHONE ENCOUNTER
Called and spoke with Miguelina (Patients sister) in regards to scheduling his MOH's and reconstruction.  I offered a surgery date of 11/19/24 at [facility].  Miguelina was agreeable to this date and location.  I also offered a new consult visit with Dr. Schofield on 9/25/24 @ 10 in our [place] location which miguelina was also agreeable to.  I also explained that Kirby will need to have medical clearance from his PCP within 30 days of his surgery and will need to reach out to his PCP to get a pre op visit scheduled.  Patients sister was appreciative of the call and verbalized understanding.

## 2024-08-23 NOTE — TELEPHONE ENCOUNTER
SAEID needs wound care instructions     DOS: 08/17/24  Left forearm wound exploration, repair of tendon and medial nerve      RN Benito called from [facility] requesting further wound care instructions from Dr. Jacobson. Patient had Sx on 8/17/23 with Dr. Jacobson.      RN states OT changed dressing, put him in new splint, and put new bandage on today but did not leave further instructions for daily care. RN would like a call back at [phone] and can ask for Benito.      (Postop appt not sched yet, RN stated they will set something up next week FYI).

## 2024-08-23 NOTE — TELEPHONE ENCOUNTER
KUSH Oliver called from [facility] requesting further wound care instructions from Dr. Jacobson. Patient had Sx on 8/17/23 with Dr. Jacobson.     RN states OT changed dressing, put him in new splint, and put new bandage on today but did not leave further instructions for daily care. RN would like a call back at [phone] and can ask for Benito.     (Postop appt not sched yet, RN stated they will set something up next week FYI).

## 2024-08-26 ENCOUNTER — MED REC SCAN ONLY (OUTPATIENT)
Dept: FAMILY MEDICINE CLINIC | Facility: CLINIC | Age: 59
End: 2024-08-26

## 2024-08-26 NOTE — TELEPHONE ENCOUNTER
Per MD - patient is scheduled. Also spoke with Benito CURRIE. States ASHRAF called him on Friday. Verbalized understanding regarding dressing changes and appt.

## 2024-08-27 DIAGNOSIS — C44.219 BASAL CELL CARCINOMA (BCC) OF LEFT EAR: Primary | ICD-10-CM

## 2024-08-28 ENCOUNTER — TELEPHONE (OUTPATIENT)
Dept: FAMILY MEDICINE CLINIC | Facility: CLINIC | Age: 59
End: 2024-08-28

## 2024-08-28 NOTE — TELEPHONE ENCOUNTER
Pre Op:    Surgery : Left ear reconstruction with local tissue rearrangement     DOS :11-19-24    Surgeon :Dr.Seitz CHRISTIANSEN   H&P  Medical Clearance   CMP   CBC     Paper work placed in PSR
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
## 2024-09-09 ENCOUNTER — OFFICE VISIT (OUTPATIENT)
Dept: ORTHOPEDICS CLINIC | Facility: CLINIC | Age: 59
End: 2024-09-09
Payer: COMMERCIAL

## 2024-09-09 ENCOUNTER — OFFICE VISIT (OUTPATIENT)
Dept: OCCUPATIONAL MEDICINE | Age: 59
End: 2024-09-09
Attending: ORTHOPAEDIC SURGERY
Payer: COMMERCIAL

## 2024-09-09 ENCOUNTER — TELEPHONE (OUTPATIENT)
Dept: PHYSICAL THERAPY | Facility: HOSPITAL | Age: 59
End: 2024-09-09

## 2024-09-09 VITALS — BODY MASS INDEX: 23.38 KG/M2 | WEIGHT: 167 LBS | HEIGHT: 71 IN

## 2024-09-09 DIAGNOSIS — S41.112A ARM LACERATION, LEFT, INITIAL ENCOUNTER: Primary | ICD-10-CM

## 2024-09-09 PROCEDURE — 97165 OT EVAL LOW COMPLEX 30 MIN: CPT

## 2024-09-09 PROCEDURE — 97760 ORTHOTIC MGMT&TRAING 1ST ENC: CPT
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
## 2024-09-09 NOTE — PROGRESS NOTES
Occupational Therapy Daily Note    Diagnosis:        Arm laceration, left, initial encounter (S41.112A)  %, FDS MF 90%, RF FDS 10%, %, median nerve partial, cutaneous br palm lacerated.    Referring Provider: Tyson  Date of Evaluation:    9/9/24    Precautions:   per protocol   Next MD/PA visit: 10/4/24  DOI: unknown  Date of Surgery: 8/17/24  Procedure Performed:   LEFT FOREARM WOUND EXPLORATION, REPAIR OF TENDON AND MEDIAN NERVE   Insurance Primary/Secondary: [place] / N/A     # Auth Visits: n/a            Orders:   Order Date:  9/9/2024  Authorizing Provider:   KJ ROA     Procedure:  OP REFERRAL TO [place] [facility] [[id number]]         Order #:   [id number]  Qty:  1     Priority:  Routine                   Class:   IHP - RFL     Standing Interval:          Standing Occurrences:          Expires on:            Expected by:    Associated DX:  Arm laceration, left, initial encounter (S41.112A)     Order summary:  IHP - RFL, Routine, 8 visits  Occupational  Therapy Area of Concentration: Orthopedic  Occupational Therapy Ortho: UE  If the patient can be seen sooner with a PT vs an OT, can we cancel this order and replace with a PT order? No         Comments:  [facility] Women Flexor Tendon Protocol (FCR and FDS zone 5 repair)          SUBJECTIVE:  Reports doing HEP.  Pain: 0    OBJECTIVE:    OBSERVATION:       OUTCOME MEASURE   On Initial Evaluation:  QuickDASH Outcome Score  Score: 81.82 % (9/9/2024  4:51 PM)      SCAR: Adhered Mod     SENSORY: Numbness: Yes, median nerve distribution  Joint position sense: presumed impaired      AROM x wrist TBA per protocol.    AROM x digits: TBA per protocol, but noted to be ~2/3 range.  PROM of digits: no tightness in PIP joints noted.    Edema: mild on dorsum of MPJ's.         Strength (lbs)  Right/Left Right  9/9/24 Left   9/9/24    NT NT   3 Point Pinch  NT NT   Lateral Pinch NT NT   NT= Not tested due to acuity          ASSESSMENT  Difficulty activating the MF FDS with place and hold.  Scar tissue is bulky and moderately adhered in forearm.          Date 9/9/24  9/10/24     Visit # 1  2     # of visits authorized:       # of visits in OT POC:  16 16    Evaluation Initial X     Progress Report written        Manual Therapy         Scar massage taught  x      STM   x      IASTM  Tuning fork, stone scraper                    Ther ex          PROM x digits in orthosis x  x     PROM x wrist by therapist, tenodesis motion    x     Place and hold:  Hook, straight and full fist   x      PROM into wrist flexion with gravity assist and active ext within orthosis x  x                                                  HEP/  Patient education topics See HEP notes below  see Pt instructions, place and hold     Therapeutic Activity                                                  Self care training       Objective measurements taken and reviewed with patient   X        Neuromuscular Re-education           Sensory re-ed  Educated pt on skin protections                      Orthotic fitting and training See above fabrication of custom orthosis Added third FA strap due to scar tissue    Taping      Modalities  Hot pack 5  minutes    X= performed this date as previously outlined    HEP  On initial eval, patient education was provided on exam findings, treatment diagnosis, treatment plan, expectations, and prognosis. Patient was also provided recommendations for use of orthosis.    HEP/Education topic Date Issued Date modified Date discharged Comments    PROM, AROM within orthosis 9/9/24                                      Goals: (to be met in 10 visits)  Patient will be independent and compliant with comprehensive HEP to maintain progress achieved in OT.  Patient will present with increase in left digit 2-5 CALDWELL to 200 to allow for ease with gripping toothbrush.  Patient will report no more than 1/10 pain in left hand/arm during self care  activities.  Patient will present with  strength in the left hand to that of 50% of the contralateral hand in order to be able to perform gripping of sports tools, bike handles.  Patient will demonstrate an improvement in the self rated DASH score to 25% or better to reflect functional gains reported, in the achievement of opening a jar, playing sports and in daily skills.  Patient will demonstrate individual finger movements for typing with 75% accuracy.  Patient will present with sufficient ROM and strength in the left hand/arm to return to work, self care, homemaking and leisure skill independent performance.    Additional goals may be established as clinically indicated.    PLAN:  Frequency / Duration: Patient will be seen for 2 x/week or a total of 10 visits over a 90 day period.  Treatment will include: Manual Therapy, Neuromuscular Re-education, Self-Care Home Management, Therapeutic Activities, Therapeutic Exercise, Home Exercise Program instruction, Modalities to include: Ultrasound and hot packs, FT, and taping and custom splinting.    Next session: per protocol    Charges:   1 MT, 2 TE   Total Timed Treatment: 40 minutes    Total Treatment Time: 45 minutes  JAMEL Dubois, OTR/L, CHT

## 2024-09-09 NOTE — PROGRESS NOTES
Clinic Note     Assessment/Plan:  59 year old male    Status post left wrist FCR, FDS middle finger, and segmental partial median nerve laceration repair on 8/17/2024-progressed the patient through [facility] and [facility]'s flexor tendon protocol.  Continue to follow for sensory and motor median nerve recovery    Follow Up: 3 weeks    Diagnostic Studies:     None       Physical Exam:     Ht 5' 11\" (1.803 m)   Wt 167 lb (75.8 kg)   BMI 23.29 kg/m²     Constitutional: NAD. AOx3. Well-developed and Well-nourished.   Psychiatric: Normal mood/ affect/ behavior. Judgment and thought content normal.     Left Upper Extremity:     Inspection    Skin incisions well-healed without signs of infection   Palpation    Tinel's at the proximal laceration site      ROM    Normal symmetric elbow motion.    Wrist motion deferred    Finger motion one third composite fist     Neurovascular    Normal sensation in ulnar, and radial nerve distribution. Normal motor function of muscles innervated by AIN, ulnar, and radial/PIN nerves.    Thumb radial 80% numbness  Thumb ulnar 50% numbness  Index finger radial 40% numbness  Index finger ulnar 40% numbness  Middle finger radial 90% numbness  Middle finger ulnar 90% numbness  Ring finger radial 70% numbness    Normally perfused hand(s).     Special    None          CC: Status post flexor tendon and median nerve repair on 8/17/2024    HPI: This 59 year old RHD male doing well postoperatively with minimal pain at this point.  He has been adherent to the splinting.  Notes some buzzing in the hand    History/Other:   Past Medical History:    Anxiety    Back pain    Depression    Hematuria    Nerve damage     Past Surgical History:   Procedure Laterality Date    Arthroscopy of joint unlisted      left knee scope    Colonoscopy N/A 1/29/2016    Procedure: COLONOSCOPY;  Surgeon: Belem Tobias MD;  Location:  ENDOSCOPY     Current Outpatient Medications   Medication Sig Dispense Refill    QUEtiapine  200 MG Oral Tab Take 1 tablet (200 mg total) by mouth nightly. 30 tablet 0    sertraline 100 MG Oral Tab Take 1 tablet (100 mg total) by mouth daily. 30 tablet 0    LORazepam 2 MG Oral Tab Take 1 tablet (2 mg total) by mouth nightly. 30 tablet 0    LORazepam 1 MG Oral Tab Take 1 tablet (1 mg total) by mouth nightly as needed. 30 tablet 0    fluticasone propionate 50 MCG/ACT Nasal Suspension 1 spray by Nasal route every 6 (six) hours as needed for Rhinitis.      cetirizine 10 MG Oral Tab Take 1 tablet (10 mg total) by mouth daily.      RYALTRIS 665-25 MCG/ACT Nasal Suspension 1 spray by Nasal route 2 (two) times daily.      sertraline 50 MG Oral Tab Take 1 tablet (50 mg total) by mouth daily. 30 tablet 0    temazepam 7.5 MG Oral Cap Take 1 capsule (7.5 mg total) by mouth nightly as needed for Sleep. 30 capsule 0    Multiple Vitamin (MULTI VITAMIN MENS) Oral Tab Take 1 tablet by mouth daily.      hydrOXYzine 25 MG Oral Tab Take 1 tablet (25 mg total) by mouth nightly as needed.      cetirizine 10 MG Oral Tab Take 1 tablet (10 mg total) by mouth daily. (Patient not taking: Reported on 7/31/2024) 90 tablet 0     No Known Allergies  Family History   Problem Relation Age of Onset    Lipids Father     Hypertension Father     Cancer Father     Other (HTN) Father     Suicide History Mother     Depression Mother     Other (Other) Brother      Social History     Occupational History    Not on file   Tobacco Use    Smoking status: Never    Smokeless tobacco: Never   Vaping Use    Vaping status: Never Used   Substance and Sexual Activity    Alcohol use: Not Currently     Comment: \"a few\"    Drug use: Never    Sexual activity: Not on file          Review of Systems (negative unless bolded):  General: fevers, chills, fatigue  CV:  chest pain, palpitations, leg swelling  Msk: bodyaches, neck pain, neck stiffness  Skin: rashes, open wounds, nonhealing ulcers  Hem: bleeds easily, bruise easily, immunocompromised  Neuro: dizziness,  light headedness, headaches  Psych: anxious, depressed, anger issues      Martell Mehta MD   Hand, Wrist, & Elbow Surgery  [email or web address]  t: [phone]  f: [phone]

## 2024-09-10 ENCOUNTER — OFFICE VISIT (OUTPATIENT)
Dept: OCCUPATIONAL MEDICINE | Age: 59
End: 2024-09-10
Attending: ORTHOPAEDIC SURGERY
Payer: COMMERCIAL

## 2024-09-10 PROCEDURE — 97140 MANUAL THERAPY 1/> REGIONS: CPT

## 2024-09-10 PROCEDURE — 97110 THERAPEUTIC EXERCISES: CPT

## 2024-09-10 NOTE — PATIENT INSTRUCTIONS
6xday: within splint:  Release two end straps and passively move your fingers with your other hand (bend them and actively straighten them to the splint boundary), then actively move them bend and straighten. 20 reps each.  Then release the wrist strap and let your wrist and hand drop out of the splint, then actively bring them back up into the splint.  20 reps.  New exercises from today: with the splint on, place and hold: 1) \"hook position\", 2) flat fist, and 3) full fist.  Place the fingers into these positions with your right hand, then activate your left hand muscles to hold the position, for 10 seconds, 10 times each of the three positions.

## 2024-09-12 NOTE — PROGRESS NOTES
Occupational Therapy Daily Note    Diagnosis:        Arm laceration, left, initial encounter (S41.112A)  %, FDS MF 90%, RF FDS 10%, %, median nerve partial, cutaneous br palm lacerated.    Referring Provider: Tyson  Date of Evaluation:    9/9/24    Precautions:   per protocol   Next MD/PA visit: 10/4/24  DOI: unknown  Date of Surgery: 8/17/24  Procedure Performed:   LEFT FOREARM WOUND EXPLORATION, REPAIR OF TENDON AND MEDIAN NERVE   Insurance Primary/Secondary: [place] / N/A     # Auth Visits: n/a            Orders:   Order Date:  9/9/2024  Authorizing Provider:   KJ ROA     Procedure:  OP REFERRAL TO [place] [facility] [[id number]]         Order #:   [id number]  Qty:  1     Priority:  Routine                   Class:   IHP - RFL     Standing Interval:          Standing Occurrences:          Expires on:            Expected by:    Associated DX:  Arm laceration, left, initial encounter (S41.112A)     Order summary:  IHP - RFL, Routine, 8 visits  Occupational  Therapy Area of Concentration: Orthopedic  Occupational Therapy Ortho: UE  If the patient can be seen sooner with a PT vs an OT, can we cancel this order and replace with a PT order? No         Comments:  [facility] Flexor Tendon Protocol (FCR and FDS zone 5 repair)          SUBJECTIVE:  No complaints. Reports doing HEP, stating \"I'm hoping I'm remembering them all!\"  Reports he cut his MF while cutting his nails.    Pain: 0    OBJECTIVE:    OBSERVATION:       OUTCOME MEASURE   On Initial Evaluation:  QuickDASH Outcome Score  Score: 81.82 % (9/9/2024  4:51 PM)      SCAR: Adhered Mod     SENSORY: Numbness: Yes, median nerve distribution  Joint position sense: presumed impaired      AROM x wrist TBA per protocol.    AROM x digits: TBA per protocol, but noted to be ~2/3 range.  PROM of digits: no tightness in PIP joints noted.    Edema: mild on dorsum of MPJ's.         Strength (lbs)  Right/Left Right  9/9/24 Left   9/9/24     NT NT   3 Point Pinch  NT NT   Lateral Pinch NT NT   NT= Not tested due to acuity         ASSESSMENT  Scar adherence limits FDS excursion to IF and MF mostly.  Mild intrinsic tightness in MF and RF noted.          Date 9/9/24  9/10/24  9/17/24   Visit # 1  2  3   # of visits authorized:       # of visits in OT POC:  16 16 16   Evaluation Initial X     Progress Report written        Manual Therapy         Scar massage taught  x  x    STM   x   x   IASTM  Tuning fork, stone scraper x                   Ther ex          PROM x digits in orthosis x  x  x   PROM x wrist by therapist, tenodesis motion    x  x   Place and hold:  Hook, straight and full fist   x  x    PROM into wrist flexion with gravity assist and active ext within orthosis x  x  x    Intrinsic stretches     x                                      HEP/  Patient education topics See HEP notes below  see Pt instructions, place and hold  skin protection for sensory impairments   Therapeutic Activity        Light prehension   Cotton balls, sponge pieces, cards, sponge rotation                                       Self care training       Objective measurements taken and reviewed with patient   X        Neuromuscular Re-education           Sensory re-ed  Educated pt on skin protections Rice marble sifting, beans, popcorn                     Orthotic fitting and training See above fabrication of custom orthosis Added third FA strap due to scar tissue    Taping   Deferred due to lotioned arm   Modalities  Hot pack 5  minutes Hot pack 5 minutes   X= performed this date as previously outlined    HEP  On initial eval, patient education was provided on exam findings, treatment diagnosis, treatment plan, expectations, and prognosis. Patient was also provided recommendations for use of orthosis.    HEP/Education topic Date Issued Date modified Date discharged Comments    PROM, AROM within orthosis 9/9/24                                      Goals: (to be met in  10 visits)  Patient will be independent and compliant with comprehensive HEP to maintain progress achieved in OT. (Progressing)  Patient will present with increase in left digit 2-5 CALDWELL to 200 to allow for ease with gripping toothbrush. (Progressing)  Patient will report no more than 1/10 pain in left hand/arm during self care activities.(Progressing)  Patient will present with  strength in the left hand to that of 50% of the contralateral hand in order to be able to perform gripping of sports tools, bike handles. (Progressing)  Patient will demonstrate an improvement in the self rated DASH score to 25% or better to reflect functional gains reported, in the achievement of opening a jar, playing sports and in daily skills. (Progressing)  Patient will demonstrate individual finger movements for typing with 75% accuracy. (Progressing)  Patient will present with sufficient ROM and strength in the left hand/arm to return to work, self care, homemaking and leisure skill independent performance. (Progressing)    Additional goals may be established as clinically indicated.    PLAN:  Frequency / Duration: Patient will be seen for 2 x/week or a total of 10 visits over a 90 day period.  Treatment will include: Manual Therapy, Neuromuscular Re-education, Self-Care Home Management, Therapeutic Activities, Therapeutic Exercise, Home Exercise Program instruction, Modalities to include: Ultrasound and hot packs, FT, and taping and custom splinting.    Next session: per protocol, taping    Charges:   1 MT, 1 TA, 1NM   Total Timed Treatment: 40 minutes    Total Treatment Time: 45 minutes  JAMEL Dubois, OTR/L, CHT

## 2024-09-13 ENCOUNTER — TELEPHONE (OUTPATIENT)
Dept: ORTHOPEDICS CLINIC | Facility: CLINIC | Age: 59
End: 2024-09-13

## 2024-09-17 ENCOUNTER — OFFICE VISIT (OUTPATIENT)
Dept: OCCUPATIONAL MEDICINE | Age: 59
End: 2024-09-17
Attending: ORTHOPAEDIC SURGERY
Payer: COMMERCIAL

## 2024-09-17 PROCEDURE — 97530 THERAPEUTIC ACTIVITIES: CPT

## 2024-09-17 PROCEDURE — 97112 NEUROMUSCULAR REEDUCATION: CPT

## 2024-09-17 PROCEDURE — 97140 MANUAL THERAPY 1/> REGIONS: CPT

## 2024-09-17 NOTE — TELEPHONE ENCOUNTER
Patient called to confirm if Release of Information received in our dept. Informed patient Release of Information received. Patient states if any questions please call him. Release of Information scanned into patient's chart.

## 2024-09-17 NOTE — TELEPHONE ENCOUNTER
Dr Mehta,    Patient is requesting disability due to arm laceration.     Start date 8/13/24 pending 11/1/24 re-eval.    Do you support?    Thank you     Caryl

## 2024-09-17 NOTE — TELEPHONE ENCOUNTER
Patient called to check status on short term disability form. Form was received via e-mail 9/13 from office. Patient stated he completed Release of Information. I do not see Release of Information on the e-mail attachment or within inter office mail. Sent My chart message for authorization. Logged form for processing.   Type of Leave: Short term disability   Reason for Leave: Arm laceration, left, initial encounter Left Hand Injury.   Start date of leave:8/13/24  End date of leave:  Pending re-eval 11/01/24  How many flare ups per month/length?:  How many appts per month/length?:   Was Fee and Turnaround info Given?: Yes

## 2024-09-17 NOTE — PROGRESS NOTES
Occupational Therapy Daily Note    Diagnosis:        Arm laceration, left, initial encounter (S41.112A)  %, FDS MF 90%, RF FDS 10%, %, median nerve partial, cutaneous br palm lacerated.    Referring Provider: Tyson  Date of Evaluation:    9/9/24    Precautions:   per protocol   Next MD/PA visit: 10/4/24  DOI: unknown  Date of Surgery: 8/17/24  Procedure Performed:   LEFT FOREARM WOUND EXPLORATION, REPAIR OF TENDON AND MEDIAN NERVE   Insurance Primary/Secondary: [place] / N/A     # Auth Visits: n/a            Orders:   Order Date:  9/9/2024  Authorizing Provider:   KJ ROA     Procedure:  OP REFERRAL TO [place] [facility] [[id number]]         Order #:   [id number]  Qty:  1     Priority:  Routine                   Class:   IHP - RFL     Standing Interval:          Standing Occurrences:          Expires on:            Expected by:    Associated DX:  Arm laceration, left, initial encounter (S41.112A)     Order summary:  IHP - RFL, Routine, 8 visits  Occupational  Therapy Area of Concentration: Orthopedic  Occupational Therapy Ortho: UE  If the patient can be seen sooner with a PT vs an OT, can we cancel this order and replace with a PT order? No         Comments:  [facility] Flexor Tendon Protocol (FCR and FDS zone 5 repair)          SUBJECTIVE:  Reports mild pain with composite flexion, on the dorsum of digits.    Pain: 3    OBJECTIVE:    OBSERVATION:       OUTCOME MEASURE   On Initial Evaluation:  QuickDASH Outcome Score  Score: 81.82 % (9/9/2024  4:51 PM)      SCAR: Adhered Mod     SENSORY: Numbness: Yes, median nerve distribution  Joint position sense: presumed impaired      AROM x wrist TBA per protocol.    AROM x digits: TBA per protocol, but noted to be ~2/3 range.  PROM of digits: no tightness in PIP joints noted.      Date: 9/20/24  LEFT  Digital AROM (°)  Index Finger  Middle Finger  Ring Finger  Small Finger    MP Ext/Flex  25/75 25/80 0/75 0/80   PIP Ext/Flex  0/80 0/85 0/90  0/90   DIP Ext/Flex  0/50 0/55 0/45 0/60   CALDWELL  180 195 210 230       Wrist AROM  9/20/24: ext 40, flexion 40    9/20/24Thumb AROM: Opposition not true, achieves to [place] 10 with flexion of CMC, MP and IPJs       Strength (lbs)  Right/Left Right  9/9/24 Left   9/9/24    NT NT   3 Point Pinch  NT NT   Lateral Pinch NT NT   NT= Not tested due to acuity               Date 9/9/24  9/10/24  9/17/24 9/20/24   Visit # 1  2  3 4   # of visits authorized:        # of visits in OT POC:  16 16 16 16   Evaluation Initial X      Progress Report written         Manual Therapy          Scar massage taught  x  x x    STM   x   x x   IASTM  Tuning fork, stone scraper x x                     Ther ex           PROM x digits in orthosis x  x  x    PROM x wrist by therapist, tenodesis motion    x  x    Place and hold:  Hook, straight and full fist   x  x     PROM into wrist flexion with gravity assist and active ext within orthosis x  x  x     Intrinsic stretches     x                                           HEP/  Patient education topics See HEP notes below  see Pt instructions, place and hold  skin protection for sensory impairments Skin care   Therapeutic Activity         Light prehension   Cotton balls, sponge pieces, cards, sponge rotation Stones, FMC game, Corn cob                                             Self care training        Objective measurements taken and reviewed with patient   X      x   Neuromuscular Re-education            Sensory re-ed  Educated pt on skin protections Rice marble sifting, beans, popcorn                         Orthotic fitting and training See above fabrication of custom orthosis Added third FA strap due to scar tissue  Fabricated custom thumb short opp for night use   Taping   Deferred due to lotioned arm Rock tape applied in space correction over scars   Modalities  Hot pack 5  minutes Hot pack 5 minutes Hot pack 5 minutes   X= performed this date as previously outlined    HEP  On  initial eval, patient education was provided on exam findings, treatment diagnosis, treatment plan, expectations, and prognosis. Patient was also provided recommendations for use of orthosis.    HEP/Education topic Date Issued Date modified Date discharged Comments    PROM, AROM within orthosis 9/9/24 9/20/24 outside of orthosis, same movements                                   ASSESSMENT  Patient advanced along protocol with dc of orthosis. But of concern is the lack of thumb opposition due to the Median nerve injury, so will need to position the thumb to avoid web space contracture development.  Scar continues moderately adhered, with tendons pulling and tension on MF        Goals: (to be met in 10 visits)  Patient will be independent and compliant with comprehensive HEP to maintain progress achieved in OT. (Progressing)  Patient will present with increase in left digit 2-5 CALDWELL to 200 to allow for ease with gripping toothbrush. (Progressing)  Patient will report no more than 1/10 pain in left hand/arm during self care activities.(Progressing)  Patient will present with  strength in the left hand to that of 50% of the contralateral hand in order to be able to perform gripping of sports tools, bike handles. (Progressing)  Patient will demonstrate an improvement in the self rated DASH score to 25% or better to reflect functional gains reported, in the achievement of opening a jar, playing sports and in daily skills. (Progressing)  Patient will demonstrate individual finger movements for typing with 75% accuracy. (Progressing)  Patient will present with sufficient ROM and strength in the left hand/arm to return to work, self care, homemaking and leisure skill independent performance. (Progressing)    Additional goals may be established as clinically indicated.    PLAN:  Frequency / Duration: Patient will be seen for 2 x/week or a total of 10 visits over a 90 day period.  Treatment will include: Manual Therapy,  Neuromuscular Re-education, Self-Care Home Management, Therapeutic Activities, Therapeutic Exercise, Home Exercise Program instruction, Modalities to include: Ultrasound and hot packs, FT, and taping and custom splinting.    Next session: per protocol, taping, consider finger foam wedge/spreaders, exerstick, monitor thumb short opponens orthosis adjust as indicated    Charges:   1 MT, 1 TA,1 Orthotic fitting and training   Total Timed Treatment: 40 minutes    Total Treatment Time: 45 minutes  JAMEL Dubois, OTR/L, CHT

## 2024-09-20 ENCOUNTER — OFFICE VISIT (OUTPATIENT)
Dept: OCCUPATIONAL MEDICINE | Age: 59
End: 2024-09-20
Attending: ORTHOPAEDIC SURGERY
Payer: COMMERCIAL

## 2024-09-20 PROCEDURE — 97760 ORTHOTIC MGMT&TRAING 1ST ENC: CPT

## 2024-09-20 PROCEDURE — 97140 MANUAL THERAPY 1/> REGIONS: CPT

## 2024-09-20 PROCEDURE — 97530 THERAPEUTIC ACTIVITIES: CPT

## 2024-09-20 NOTE — PROGRESS NOTES
Occupational Therapy Daily Note    Diagnosis:        Arm laceration, left, initial encounter (S41.112A)  %, FDS MF 90%, RF FDS 10%, %, median nerve partial, cutaneous br palm lacerated.    Referring Provider: Tyson  Date of Evaluation:    9/9/24    Precautions:   per protocol   Next MD/PA visit: 10/4/24  DOI: unknown  Date of Surgery: 8/17/24  Procedure Performed:   LEFT FOREARM WOUND EXPLORATION, REPAIR OF TENDON AND MEDIAN NERVE   Insurance Primary/Secondary: [employer] PPO / N/A     # Auth Visits: n/a            Orders:   Order Date:  9/9/2024  Authorizing Provider:   KJ ROA     Procedure:  OP REFERRAL TO [facility] [[id number]]         Order #:   [id number]  Qty:  1     Priority:  Routine                   Class:   IHP - RFL     Standing Interval:          Standing Occurrences:          Expires on:            Expected by:    Associated DX:  Arm laceration, left, initial encounter (S41.112A)     Order summary:  IHP - RFL, Routine, 8 visits  Occupational  Therapy Area of Concentration: Orthopedic  Occupational Therapy Ortho: UE  If the patient can be seen sooner with a PT vs an OT, can we cancel this order and replace with a PT order? No         Comments:  [facility] Women Flexor Tendon Protocol (FCR and FDS zone 5 repair)          SUBJECTIVE:  Expressed concern about lack of thumb strength and movement.  Reports trying to clip nails and could not perform.    Pain: did not quantify, but noted it has lessened.    OBJECTIVE:    OBSERVATION:       OUTCOME MEASURE   On Initial Evaluation:  QuickDASH Outcome Score  Score: 81.82 % (9/9/2024  4:51 PM)      SCAR: Adhered Mod     SENSORY: Numbness: Yes, median nerve distribution  Joint position sense: presumed impaired      AROM x wrist TBA per protocol.    AROM x digits: TBA per protocol, but noted to be ~2/3 range.  PROM of digits: no tightness in PIP joints noted.      Date: 9/20/24  LEFT  Digital AROM (°)  Index Finger  Middle Finger  Ring  Finger  Small Finger    MP Ext/Flex  25/75 25/80 0/75 0/80   PIP Ext/Flex  0/80 0/85 0/90 0/90   DIP Ext/Flex  0/50 0/55 0/45 0/60   CALDWELL  180 195 210 230       Wrist AROM  9/20/24: ext 40, flexion 40    9/20/24Thumb AROM: Opposition not true, achieves to [place] 10 with flexion of CMC, MP and IPJs       Strength (lbs)  Right/Left Right  9/9/24 Left   9/9/24    NT NT   3 Point Pinch  NT NT   Lateral Pinch NT NT   NT= Not tested due to acuity               Date 9/9/24  9/10/24  9/17/24 9/20/24 9/23/24   Visit # 1  2  3 4 5   # of visits authorized:         # of visits in OT POC:  16 16 16 16 16   Evaluation Initial X       Progress Report written          Manual Therapy           Scar massage taught  x  x x x    STM   x   x x x   IASTM  Tuning fork, stone scraper x x Suction cup scar extractor                       Ther ex            PROM x digits in orthosis x  x  x     PROM x wrist by therapist, tenodesis motion    x  x     Place and hold:  Hook, straight and full fist   x  x      PROM into wrist flexion with gravity assist and active ext within orthosis x  x  x      Intrinsic stretches     x      Fluidotherapy        Fluidotherapy for 10 minutes to left hand, with AROM x wrist and digits performed.      flexbar        Yellow, rolling over scars, for finger ext                      HEP/  Patient education topics See HEP notes below  see Pt instructions, place and hold  skin protection for sensory impairments Skin care Scar    Therapeutic Activity          Light prehension   Cotton balls, sponge pieces, cards, sponge rotation Stones, FMC game, corn cob Large beads, tennis ball on Frisbee, corn cob rolling, maze ball                                                   Self care training         Objective measurements taken and reviewed with patient   X      x    Neuromuscular Re-education             Sensory re-ed  Educated pt on skin protections Rice marble sifting, beans, popcorn  (FT)                            Orthotic fitting and training See above fabrication of custom orthosis Added third FA strap due to scar tissue  Fabricated custom thumb short opp for night use Reheated and remolded short opponens for daytime; made night first webspace spacer orthosis   Taping   Deferred due to lotioned arm Rock tape applied in space correction over scars    Modalities  Hot pack 5  minutes Hot pack 5 minutes Hot pack 5 minutes    X= performed this date as previously outlined    HEP  On initial eval, patient education was provided on exam findings, treatment diagnosis, treatment plan, expectations, and prognosis. Patient was also provided recommendations for use of orthosis.    HEP/Education topic Date Issued Date modified Date discharged Comments    PROM, AROM within orthosis 9/9/24      Light prehension with orthosis on 9/23/24                             ASSESSMENT  Patient presents with mild first web space tightness, not unexpected from Median nerve injury.  I have reassured him that this is normal and reviewed nerve healing expectations.        Goals: (to be met in 10 visits)  Patient will be independent and compliant with comprehensive HEP to maintain progress achieved in OT. (Progressing)  Patient will present with increase in left digit 2-5 CALDWELL to 200 to allow for ease with gripping toothbrush. (Progressing)  Patient will report no more than 1/10 pain in left hand/arm during self care activities.(Progressing)  Patient will present with  strength in the left hand to that of 50% of the contralateral hand in order to be able to perform gripping of sports tools, bike handles. (Progressing)  Patient will demonstrate an improvement in the self rated DASH score to 25% or better to reflect functional gains reported, in the achievement of opening a jar, playing sports and in daily skills. (Progressing)  Patient will demonstrate individual finger movements for typing with 75% accuracy. (Progressing)  Patient will present with  sufficient ROM and strength in the left hand/arm to return to work, self care, homemaking and leisure skill independent performance. (Progressing)    Additional goals may be established as clinically indicated.    PLAN:  Frequency / Duration: Patient will be seen for 2 x/week or a total of 10 visits over a 90 day period.  Treatment will include: Manual Therapy, Neuromuscular Re-education, Self-Care Home Management, Therapeutic Activities, Therapeutic Exercise, Home Exercise Program instruction, Modalities to include: Ultrasound and hot packs, FT, and taping and custom splinting.    Next session: per protocol, taping    Charges:   1 MT, 1 TA,1 Orthotic fitting and training, 1 TE   Total Timed Treatment: 45 minutes    Total Treatment Time: 45 minutes  JAMEL Dubois, OTR/L, CHT

## 2024-09-20 NOTE — TELEPHONE ENCOUNTER
Dr. Mehta,    Please sign off on form if you agree to: Disability due to Arm Laceration; start 8/13/24 - 11/01/24, pending re-eval    -Signature page will be the first page scanned  -From your Inbasket, Highlight the patient and click Chart   -Double click the 09/13/24 Forms Completion telephone encounter  -Scroll down to the Media section   -Click the blue Hyperlink: Disability Dr Mehta 9/20/24  -Click Acknowledge located in the top right ribbon/menu   -Drag the mouse into the blank space of the document and a + sign will appear. Left click to   electronically sign the document.  -Once signed, simply exit out of the screen and you signature will be saved.     Thank you for your time,      Fiorella

## 2024-09-20 NOTE — TELEPHONE ENCOUNTER
Form completed and faxed to [facility] at [phone], confirmation received.  Mychart sent to advise patient.

## 2024-09-23 ENCOUNTER — OFFICE VISIT (OUTPATIENT)
Dept: OCCUPATIONAL MEDICINE | Age: 59
End: 2024-09-23
Attending: ORTHOPAEDIC SURGERY
Payer: COMMERCIAL

## 2024-09-23 PROCEDURE — 97760 ORTHOTIC MGMT&TRAING 1ST ENC: CPT

## 2024-09-23 PROCEDURE — 97140 MANUAL THERAPY 1/> REGIONS: CPT

## 2024-09-23 PROCEDURE — 97110 THERAPEUTIC EXERCISES: CPT

## 2024-09-23 PROCEDURE — 97530 THERAPEUTIC ACTIVITIES: CPT

## 2024-09-24 NOTE — PROGRESS NOTES
Occupational Therapy Daily Note    Diagnosis:        Arm laceration, left, initial encounter (S41.112A)  %, FDS MF 90%, RF FDS 10%, %, median nerve partial, cutaneous br palm lacerated.    Referring Provider: Tyson  Date of Evaluation:    9/9/24    Precautions:   per protocol   Next MD/PA visit: 10/4/24  DOI: unknown  Date of Surgery: 8/17/24  Procedure Performed:   LEFT FOREARM WOUND EXPLORATION, REPAIR OF TENDON AND MEDIAN NERVE   Insurance Primary/Secondary: [employer] PPO / N/A     # Auth Visits: n/a            Orders:   Order Date:  9/9/2024  Authorizing Provider:   KJ ROA     Procedure:  OP REFERRAL TO [facility] [[id number]]         Order #:   [id number]  Qty:  1     Priority:  Routine                   Class:   IHP - RFL     Standing Interval:          Standing Occurrences:          Expires on:            Expected by:    Associated DX:  Arm laceration, left, initial encounter (S41.112A)     Order summary:  IHP - RFL, Routine, 8 visits  Occupational  Therapy Area of Concentration: Orthopedic  Occupational Therapy Ortho: UE  If the patient can be seen sooner with a PT vs an OT, can we cancel this order and replace with a PT order? No         Comments:  [facility] Women Flexor Tendon Protocol (FCR and FDS zone 5 repair)          SUBJECTIVE:  Reports unable to tolerate first web spacer at night.    Pain: did not quantify, but noted it has lessened.    OBJECTIVE:    OBSERVATION:       OUTCOME MEASURE   On Initial Evaluation:  QuickDASH Outcome Score  Score: 81.82 % (9/9/2024  4:51 PM)      SCAR: Adhered Mod     SENSORY: Numbness: Yes, median nerve distribution  Joint position sense: presumed impaired      AROM x wrist TBA per protocol.    AROM x digits: TBA per protocol, but noted to be ~2/3 range.  PROM of digits: no tightness in PIP joints noted.      Date: 9/20/24  LEFT  Digital AROM (°)  Index Finger  Middle Finger  Ring Finger  Small Finger    MP Ext/Flex  25/75 25/80 0/75 0/80    PIP Ext/Flex  0/80 0/85 0/90 0/90   DIP Ext/Flex  0/50 0/55 0/45 0/60   CALDWELL  180 195 210 230       Date: 9/27/24  LEFT  Digital AROM (°)  Index Finger  Middle Finger  Ring Finger  Small Finger    MP Ext/Flex  15/80 15/80 0/85 0/90   PIP Ext/Flex  0/90 0/90 0/90 0/95   DIP Ext/Flex  0/60 0/65 0/60 0/75   CALDWELL  215 220 235 260         Wrist AROM  9/20/24: ext 40, flexion 40  9/27/24: ext 60, flexion 60    9/20/24Thumb AROM: Opposition not true, achieves to [place] 10 with flexion of CMC, MP and IPJs       Strength (lbs)  Right/Left Right  9/9/24 Left   9/9/24    NT NT   3 Point Pinch  NT NT   Lateral Pinch NT NT   NT= Not tested due to acuity               Date 9/9/24  9/10/24  9/17/24 9/20/24 9/23/24 9/27/24   Visit # 1  2  3 4 5 6   # of visits authorized:          # of visits in OT POC:  16 16 16 16 16 16   Evaluation Initial X        Progress Report written           Manual Therapy            Scar massage taught  x  x x x     STM   x   x x x    IASTM  Tuning fork, stone scraper x x Suction cup scar extractor Scar extractor                         Ther ex             PROM x digits in orthosis x  x  x      PROM x wrist by therapist, tenodesis motion    x  x      Place and hold:  Hook, straight and full fist   x  x       PROM into wrist flexion with gravity assist and active ext within orthosis x  x  x       Intrinsic stretches     x       Fluidotherapy        Fluidotherapy for 10 minutes to left hand, with AROM x wrist and digits performed.   Fluidotherapy for 10 minutes to left hand, with AROM x wrist and digits performed.    flexbar        Yellow, rolling over scars, for finger ext    Thumb web space stretches                     HEP/  Patient education topics See HEP notes below  see Pt instructions, place and hold  skin protection for sensory impairments Skin care Scar  OT POC   Therapeutic Activity           Light prehension   Cotton balls, sponge pieces, cards, sponge rotation Stones, FMC game, corn  cob Large beads, tennis ball on Frisbee, corn cob rolling, maze ball Targeted tapping, maze ball, soft large ball                                                         Self care training          Objective measurements taken and reviewed with patient   X      x  x   Neuromuscular Re-education              Sensory re-ed  Educated pt on skin protections Rice marble sifting, beans, popcorn  (FT) Rice marble sifting without vision                              Orthotic fitting and training See above fabrication of custom orthosis Added third FA strap due to scar tissue  Fabricated custom thumb short opp for night use Reheated and remolded short opponens for daytime; made night first webspace spacer orthosis    Taping   Deferred due to lotioned arm Rock tape applied in space correction over scars     Modalities  Hot pack 5  minutes Hot pack 5 minutes Hot pack 5 minutes  Ultrasound:  3mHz  0.8w/cm2  50%  to volar distal scar  for 8 minutes.     X= performed this date as previously outlined    HEP  On initial eval, patient education was provided on exam findings, treatment diagnosis, treatment plan, expectations, and prognosis. Patient was also provided recommendations for use of orthosis.    HEP/Education topic Date Issued Date modified Date discharged Comments    PROM, AROM within orthosis 9/9/24      Light prehension with orthosis on 9/23/24                             ASSESSMENT  Patient presents with improved wrist and digital AROM.  While he has met the goal for digital ROM, there still is a slight extension lag due to tension on the flexors/scar tissue that can be improved.  Thumb deficits are most limiting to patient, as he cannot fully oppose and lacks normal sensation.        Goals: (to be met in 10 visits)  Patient will be independent and compliant with comprehensive HEP to maintain progress achieved in OT. (Progressing)  Patient will present with increase in left digit 2-5 CALDWELL to 200 to allow for ease with  gripping toothbrush. (Met)  New Goal: Patient will present with full digital extension with wrist in at least 40 degrees of extension, demonstrating increase in scar/soft tissue flexibility. (Progressing)  Patient will report no more than 1/10 pain in left hand/arm during self care activities.(Progressing)  Patient will present with  strength in the left hand to that of 50% of the contralateral hand in order to be able to perform gripping of sports tools, bike handles. (Progressing)  Patient will demonstrate an improvement in the self rated DASH score to 25% or better to reflect functional gains reported, in the achievement of opening a jar, playing sports and in daily skills. (Progressing)  Patient will demonstrate individual finger movements for typing with 75% accuracy. (Progressing)  Patient will present with sufficient ROM and strength in the left hand/arm to return to work, self care, homemaking and leisure skill independent performance. (Progressing)    Additional goals may be established as clinically indicated.    PLAN:  Frequency / Duration: Patient will be seen for 2 x/week or a total of 10 visits over a 90 day period.  Treatment will include: Manual Therapy, Neuromuscular Re-education, Self-Care Home Management, Therapeutic Activities, Therapeutic Exercise, Home Exercise Program instruction, Modalities to include: Ultrasound and hot packs, FT, and taping and custom splinting.    Next session: per protocol, continue US, FT if able; begin putty for  and pinch per protocol; monitor thumb position and motion.    Charges:   1 MT, 1 TA,1 US, 1 TE   Total Timed Treatment: 45 minutes    Total Treatment Time: 45 minutes  JAMEL Dubois, OTR/L, CHT

## 2024-09-24 NOTE — CONSULTS
New Patient Consultation    Chief Complaint: basal cell carcinoma left ear    History of Present Illness:   Noble Arizmendi is a 59 year old male referred by [facility] for evaluation of a recently diagnosed basal cell carcinoma, nodular pattern, of the left superior helix. This was diagnosed via shave biopsy on 6/12/2024. He first noticed this lesion in January 2024. He is scheduled to undergo a MOHs procedure on 11/14/2024 and is here today to discuss options for reconstruction.      Pathology from 6/12/2024:  [facility]  Left superior helix: basal cell carcinoma nodular pattern    Past Medical History:      Past Medical History:    Anxiety    Back pain    Depression    Hematuria    Nerve damage         Past Surgical History:  Past Surgical History:   Procedure Laterality Date    Arthroscopy of joint unlisted      left knee scope    Colonoscopy N/A 1/29/2016    Procedure: COLONOSCOPY;  Surgeon: Belem Tobias MD;  Location:  ENDOSCOPY         Medications:    No outpatient medications have been marked as taking for the 9/25/24 encounter (Appointment) with Adele Anne MD.         Allergies:    No Known Allergies      Family History:   Family History   Problem Relation Age of Onset    Lipids Father     Hypertension Father     Cancer Father     Other (HTN) Father     Suicide History Mother     Depression Mother     Other (Other) Brother          Social History:  History   Alcohol Use Not Currently     Comment: \"a few\"       History   Smoking Status    Never   Smokeless Tobacco    Never       History   Drug Use Unknown       Review of Systems:    A 13 point review of systems was performed on the intake sheet.  The patient reports   General:   The patient denies, fever, chills, night sweats, fatigue, generalized weakness, change in appetite, weight loss, or weight gain.  Endocrine:   There is no history of poor/slow wound healing, weight loss/gain, fertility or hormone problems, cold intolerance,  heat intolerance, excessive thirst, or thyroid disease.   Allergic/Immunologic:  There is no history of hives, hay fever, angioedema or anaphylaxis.  HEENT:    The patient denies ear pain, ear drainage, hearing loss, change in vision, double vision, cataracts, glaucoma, nasal congestion, nosebleed, hoarseness, sore throat, or swollen glands  Respiratory:   The patient denies shortness of breath, cough, bloody cough, phlegm, asthma, or wheezing  Cardiovascular:  The patient denies chest pain/pressure, palpitations, irregular heartbeat, high blood pressure, stroke, or leg swelling  Gastrointestinal:   There is no history of difficulty or pain with swallowing, reflux symptoms, nausea, vomiting, dark/ bloody stools, diarrhea, constipation,  change in bowel habits, or abdominal pain.   Genitourinary:  The patient denies frequent urination, needing to get up at night to urinate, urinary hesitancy or retaining urine, painful urination, urinary incontinence, decreased urine stream, blood in the urine, or vaginal/penile discharge.  Skin:   The patient denies rash, itching, skin lesions, dry skin, change in skin color or change in moles, sunburns, or sunburns with blistering.   Hematologic/Lymphatic:  The patient denies easily bruising or bleeding, persistent swollen glands or lymph nodes, bleeding disorders, blood clots, or pulmonary embolism.   Musculoskeletal:  The patient denies muscle aches/pain, joint pain, stiff joints, neck pain, back pain or bone pain.  Neurologic:  There is no history of migraines or severe headaches, seizure/epilepsy, speech problems, coordination problems, trembling/tremors, fainting/black outs, dizziness, memory problems, loss of sensation/numbness, problems walking, weakness, tingling or burning in hands/feet.   Psychiatric:  There is no history of abusive relationship, bipolar disorder, sleep disturbance, +anxiety, +depression or feeling of despair.    Physical Exam:    There were no vitals  taken for this visit.    Constitutional: The patient is an alert, oriented and well-developed.     Neurologic: Speech patterns and movements are normal.     Psychiatric: Affect is appropriate.    Eyes: Conjunctiva are clear, non-icteric. PERRL    ENT: no obvious abnormality, no ear drainage, mucous membranes moist and pink    Integument/Skin: left ear superior helical rim with healed biopsy site, visible raised basal cell 4 x 2 mm    Respiratory: Normal respiratory effort.     Cardiovascular: no cyanosis, no edema    Musculoskeletal: Extremities unremarkable, without edema, tenderness or deformities    Impression:   Noble Arizmendi  is a 59 year old with basal cell carcinoma left superior helix    Discussion and Plan:  The patient was counseled on the different treatment options.     Patient plans for Mohs excision of his basal cell carcinoma left superior helix with with [facility] on 11/14/2024. We discussed options for reconstruction and explained that we will not know the size of the defect until after the Mohs excision. We will plan for left ear reconstruction with local tissue rearrangement, possible grafts, possible integra, possible biodegradable temporizing matrix on 11/19/2024. We discussed the possible role for second stage reconstruction. Representative photos were reviewed with the patient. Patient will obtain clearance from his primary care provider and psychiatrist.     The different treatment options were discussed with the patient.  The procedures and the postoperative care was discussed in detail.  Potential risks complications benefits and alternatives were discussed.  Risks and complications including but not limited to infection, bleeding, scarring, damage to surrounding structures, such as nerves, blood vessels, muscles,  tendons, risk of hematoma, seroma, foreign body reaction, allergic reaction, wound dehiscences, delayed wound healing, graft failure, flap failure, need for further  surgery.    Patient understands and wishes to proceed with the procedure, questions were answered.    45 minutes were spent with the patient, from which 35 minutes were spent counseling the patient and coordinating care.

## 2024-09-25 ENCOUNTER — TELEPHONE (OUTPATIENT)
Dept: FAMILY MEDICINE CLINIC | Facility: CLINIC | Age: 59
End: 2024-09-25

## 2024-09-25 ENCOUNTER — OFFICE VISIT (OUTPATIENT)
Dept: SURGERY | Facility: CLINIC | Age: 59
End: 2024-09-25
Payer: COMMERCIAL

## 2024-09-25 DIAGNOSIS — C44.219 BASAL CELL CARCINOMA (BCC) OF LEFT EAR: Primary | ICD-10-CM

## 2024-09-25 DIAGNOSIS — Z01.818 PRE-OP TESTING: Primary | ICD-10-CM

## 2024-09-25 PROCEDURE — 99243 OFF/OP CNSLTJ NEW/EST LOW 30: CPT | Performed by: SURGERY

## 2024-09-25 NOTE — TELEPHONE ENCOUNTER
Received a fax on 9/25/24 for a pre-op. Surgery on 11/9/24 for left ear reconstruction with Dr. Bernard at [facility].     Labs needed are ordered. Please call patient to schedule a pre-op exam.   Please inform the patient of the labs ordered and to complete the labs before pre-op exam.     Fax placed in the phone room.

## 2024-09-25 NOTE — PATIENT INSTRUCTIONS
Surgeon:              Dr. Adele Anne, PhD                                          Tel:         [phone]                                  Fax:        [phone]      Surgery/Procedure: Left ear reconstruction with local tissue rearrangement, possible grafts, possible integra, possible biodegradable temporizing matrix  2 hours, general anesthesia, outpatient, 11/19/2024  AND  Left ear reconstruction with skin graft  1.5 hours, general anesthesia, outpatient    Dx Code:  [C44.219]     Hospital:  [facility]: [address] [place] [address]           [phone]  [facility]: [address], [place] [address]               [phone]    1. Someone will need to drive you to and from the hospital if your procedure is outpatient.    2.Do not drink alcohol or smoke 24 hours prior to your procedure.    3. Bring a picture ID and your insurance card.    4. You will be contacted by the hospital the day before to confirm the procedure time and location.     5. Do not take any herbal supplements or blood thinners at least one week before your procedure/surgery. This includes NSAID's (aspirin, baby aspirin, Motrin, Ibuprofen, Aleve, Advil, Naproxen, etc), fish oil, vitamin E, turmeric, CoQ10, or green tea supplements, etc. *TYLENOL or acetaminophen is ok to take*    6. PRE-OPERATIVE TESTING: History and physical with medical clearance is REQUIRED within 30 days of the surgery date and is mandatory per Dr. Anne. *If this is not done, your surgery will be postponed*  MEDICAL CLEARANCE WITH DR. Bailey  Psychiatric clearance   CBC  CMP  EKG (within 90 days)    7. Please inform us if you start or change any medications at least one week before surgery (ex: blood thinners, weight loss medications, diabetic medications, herbal supplements, etc)    8. Does patient have diagnosis of sleep apnea?    [   ] Yes     [  X ]  No    Consent obtained   Photos taken on 9/25/2024

## 2024-09-26 NOTE — TELEPHONE ENCOUNTER
Patient was scheduled for 10/30 for pre-op     Was informed about the blood and EKG being done 2 days before hand.     Paperwork placed in MA's pre op folder.

## 2024-09-27 ENCOUNTER — OFFICE VISIT (OUTPATIENT)
Dept: OCCUPATIONAL MEDICINE | Age: 59
End: 2024-09-27
Attending: ORTHOPAEDIC SURGERY
Payer: COMMERCIAL

## 2024-09-27 DIAGNOSIS — C44.219 BASAL CELL CARCINOMA (BCC) OF LEFT EAR: Primary | ICD-10-CM

## 2024-09-27 PROCEDURE — 97110 THERAPEUTIC EXERCISES: CPT

## 2024-09-27 PROCEDURE — 97035 APP MDLTY 1+ULTRASOUND EA 15: CPT

## 2024-09-27 PROCEDURE — 97530 THERAPEUTIC ACTIVITIES: CPT

## 2024-09-27 PROCEDURE — 97140 MANUAL THERAPY 1/> REGIONS: CPT

## 2024-09-29 NOTE — OPERATIVE REPORT
Operative Report       Patient Name: Kirby Conteh    8/17/2024    Preoperative Diagnosis:     Left forearm laceration    Postoperative Diagnosis:     Partial left median nerve at mid forearm and distal forearm  Complete laceration of left flexor carpi radialis tendon  Partial laceration (90%) of left middle finger FDS tendon  Partial laceration (10% of left ring finger FDS tendon  Complete laceration of left palmar cutaneous branch of median nerve  Complete laceration of left palmaris longus tendon    Surgeons and Role:     * Ezio Jacobson MD - Primary     Assistant:  None    Procedures:     Repair of left partial median nerve laceration at mid forearm level and distal forearm level  Repair of left flexor carpi radial tendon at distal forearm level (Zone 5)  Repair of left middle finger FDS tendon  Debridement of left ring finger FDS tendon laceration  Repair of left palmar cutaneous branch of median nerve  Operative use of microscope    Antibiotics: Ancef 2g     Surgical Findings: 50% median nerve laceration at mid forearm (from 12:00 to 6:00-12:00 volar, 3:00 ulnar), 10% median nerve laceration at distal forearm at the level of palmar cutaneous branch takeoff, complete laceration of flexor carpi radialis tendon, 90% laceration of left middle finger FDS tendon, 2% laceration of left ring finger FDS tendon, complete laceration of palmar cutaneous branch median nerve    Anesthesia: General    Complications: None    Specimen: None    Condition: Stable    Estimated Blood Loss: 25 mL    Indications:  59 year old male who lacerated the volar aspect of his left forearm at multiple levels.  Patient preoperatively was noted to have significant numbness predominantly in the median nerve distribution.  Surgical exploration and repair of injured structures was recommended.  Patient agreed after the risks associated with the procedure, expected  outcome, the expected time to recovery, and the possible need for rehab  required were discussed with the patient. The patient consented to the operation having understood all the above.       Procedure:  Patient was met in the preoperative holding area where consent was verified, laterality was marked with the surgeon's initials, and the H&P was updated. Patient was brought to the operating room on a transport cart.  Patient was transferred from the transport cart onto the operating room table and placed in a supine position.  An upper arm tourniquet was placed.  The arm was prepped and draped in the usual sterile fashion.  A surgical timeout was performed.  Antibiotics were fully infused. Local was infiltrated into the subcutaneous tissue until adequate anesthesia was noted. The limb was elevated and exsanguinated using Esmarch bandage.  Tourniquet was raised to 250 mmHg.    The distal forearm transverse laceration was first explored.  It was the largest of the skin laceration.  It was extended longitudinally distally and proximally.  Wilson scissors were used to dissect through subcutaneous tissue down to the antebrachial fascia which was noted to be lacerated.  Small vessels were cauterized and bipolar cautery.  The antebrachial fascia was divided longitudinally to allow visualization of the flexor tendons.  The middle finger FDS tendon was noted to be 90% lacerated and the ring finger FDS tendon was noted to be 10% lacerated.  The FDS tendon laceration was abraded to a smooth stable base.  The middle finger FDS tendon was repaired using 3-0 Supramid in a mTang technique resulting in a 6 core strand repair.  All the FDP's were noted to be intact.  Dissection in the radial aspect of the laceration identified a complete laceration of.  This was repaired using a flexor carpi radialis tendon 2-0 Ethibond suture in a locking Hurd stitch -a 6 core strand repair was performed.  The median nerve was found deep to the lacerated palmaris longus tendon and was noted to have been lacerated  at the takeoff of the palmar cutaneous branch.  Under operative microscope the palmar cutaneous branch was repaired using 9-0 nylon suture.  2 epineurial stitches were used to repair the palmar cutaneous branch laceration.  10% of the volar surface of the median nerve at this level was also noted to be laceration and prior to repairing the palmar cutaneous branch it was repaired using 9-0 nylon with 3 epineurial stitches.  The degree of numbness preoperatively was not consistent with amount of median nerve injury and thus the proximal aspect of the incision was extended proximally to include a smaller 1 inch transverse laceration.  There and more significant median nerve injury was noted.  This incision likely represented a deep penetrating injury by knife.  50% of the median nerve was noted to be lacerated at this level.  Include injury from roughly the 12:00 to 6:00 with 12:00 representing the volar surface of the median nerve at 3:00 represent the ulnar aspect of the median nerve.  Under operative microscope the nerve laceration was repaired.  Bulging fascicles were trimmed and 3 epineurial stitches using 9-0 nylon was performed under operative microscope.  Good reapproximation was noted.  The wounds were irrigated with sterile saline prior to closure.  The tourniquet was let down hemostasis achieved with gentle pressure and bipolar cautery.    Closure:  3 Monocryl was used to reapproximate the skin edges.  4-0 nylon was used to close the superficial skin.    Dressing/Splint:  Bacitracin, Adaptic, 4 x 4 gauze and web roll was applied.  A dorsal blocking forearm splint was applied and held in place with Ace wrap.  Patient's arm was placed in a sling.    Post Operative:  Patient was woken up from anesthesia and taken to PACU for further recovery prior to returning to his inpatient bed.    Ezio Jacobson MD   Hand, Wrist, & Elbow Surgery  [email or web address]  t: [phone]  f: [phone]

## 2024-10-01 ENCOUNTER — OFFICE VISIT (OUTPATIENT)
Dept: OCCUPATIONAL MEDICINE | Age: 59
End: 2024-10-01
Attending: ORTHOPAEDIC SURGERY
Payer: COMMERCIAL

## 2024-10-01 PROCEDURE — 97035 APP MDLTY 1+ULTRASOUND EA 15: CPT

## 2024-10-01 PROCEDURE — 97530 THERAPEUTIC ACTIVITIES: CPT

## 2024-10-01 PROCEDURE — 97140 MANUAL THERAPY 1/> REGIONS: CPT

## 2024-10-01 NOTE — PROGRESS NOTES
Occupational Therapy Daily Note    Diagnosis:        Arm laceration, left, initial encounter (S41.112A)  %, FDS MF 90%, RF FDS 10%, %, median nerve partial, cutaneous br palm lacerated.    Referring Provider: Tyson  Date of Evaluation:    9/9/24    Precautions:   per protocol   Next MD/PA visit: 10/4/24  DOI: unknown  Date of Surgery: 8/17/24  Procedure Performed:   LEFT FOREARM WOUND EXPLORATION, REPAIR OF TENDON AND MEDIAN NERVE   Insurance Primary/Secondary: [employer] [place] PPO / N/A     # Auth Visits: n/a            Orders:   Order Date:  9/9/2024  Authorizing Provider:   KJ ROA     Procedure:  OP REFERRAL TO [place] [facility] [[id number]]         Order #:   [id number]  Qty:  1     Priority:  Routine                   Class:   IHP - RFL     Standing Interval:          Standing Occurrences:          Expires on:            Expected by:    Associated DX:  Arm laceration, left, initial encounter (S41.112A)     Order summary:  IHP - RFL, Routine, 8 visits  Occupational  Therapy Area of Concentration: Orthopedic  Occupational Therapy Ortho: UE  If the patient can be seen sooner with a PT vs an OT, can we cancel this order and replace with a PT order? No         Comments:  Tera Women Flexor Tendon Protocol (FCR and FDS zone 5 repair)          SUBJECTIVE:  Pt able to tolerate the web spacer at night and has worn every night.    Pain: states no pain, but numbness is uncomfortable.    OBJECTIVE:    OBSERVATION:       OUTCOME MEASURE   On Initial Evaluation:  QuickDASH Outcome Score  Score: 81.82 % (9/9/2024  4:51 PM)      SCAR: Adhered Mod     SENSORY: Numbness: Yes, median nerve distribution  Joint position sense: presumed impaired          Date: 9/20/24  LEFT  Digital AROM (°)  Index Finger  Middle Finger  Ring Finger  Small Finger    MP Ext/Flex  25/75 25/80 0/75 0/80   PIP Ext/Flex  0/80 0/85 0/90 0/90   DIP Ext/Flex  0/50 0/55 0/45 0/60   CALDWELL  180 195 210 230       Date: 9/27/24  LEFT  Digital  AROM (°)  Index Finger  Middle Finger  Ring Finger  Small Finger    MP Ext/Flex  15/80 15/80 0/85 0/90   PIP Ext/Flex  0/90 0/90 0/90 0/95   DIP Ext/Flex  0/60 0/65 0/60 0/75   CALDWELL  215 220 235 260         Wrist AROM  9/20/24: ext 40, flexion 40  9/27/24: ext 60, flexion 60  10/1/24: ext 60, flexion 70    9/20/24Thumb AROM: Opposition not true, achieves to [place] 10 with flexion of CMC, MP and IPJs       Strength (lbs)  Right/Left Right  9/9/24 Left   9/9/24    NT NT   3 Point Pinch  NT NT   Lateral Pinch NT NT   NT= Not tested due to acuity           Date 9/9/24  9/10/24  9/17/24 9/20/24 9/23/24 9/27/24 10/1/24   /Visit # 1  2  3 4 5 6 7   # of visits authorized:           # of visits in OT POC:  16 16 16 16 16 16 16   Evaluation Initial X         Progress Report written            Manual Therapy             Scar massage taught  x  x x x      STM   x   x x x  X retraction   IASTM  Tuning fork, stone scraper x x Suction cup scar extractor Scar extractor With namita hook                           Ther ex              PROM x digits in orthosis x  x  x       PROM x wrist by therapist, tenodesis motion    x  x       Place and hold:  Hook, straight and full fist   x  x        PROM into wrist flexion with gravity assist and active ext within orthosis x  x  x    opposition    Intrinsic stretches     x        Fluidotherapy        Fluidotherapy for 10 minutes to left hand, with AROM x wrist and digits performed.   Fluidotherapy for 10 minutes to left hand, with AROM x wrist and digits performed.     flexbar        Yellow, rolling over scars, for finger ext     Thumb web space stretches       x                HEP/  Patient education topics See HEP notes below  see Pt instructions, place and hold  skin protection for sensory impairments Skin care Scar  OT POC Sensory re-education   Therapeutic Activity            Light prehension   Cotton balls, sponge pieces, cards, sponge rotation Stones, FMC game, corn cob Large  beads, tennis ball on Frisbee, corn cob rolling, maze ball Targeted tapping, maze ball, soft large ball Manipulating medium match 4                                                               Self care training           Objective measurements taken and reviewed with patient   X      x  x x   Neuromuscular Re-education               Sensory re-ed  Educated pt on skin protections Rice marble sifting, beans, popcorn  (FT) Rice marble sifting without vision Verbalizing sensation on poker chip                                 Orthotic fitting and training See above fabrication of custom orthosis Added third FA strap due to scar tissue  Fabricated custom thumb short opp for night use Reheated and remolded short opponens for daytime; made night first webspace spacer orthosis  Strap for opposition positioning   Taping   Deferred due to lotioned arm Rock tape applied in space correction over scars      Modalities  Hot pack 5  minutes Hot pack 5 minutes Hot pack 5 minutes  Ultrasound:  3mHz  0.8w/cm2  50%  to volar distal scar  for 8 minutes.   Ultrasound:  3mHz  0.8w/cm2  50%  to volar distal scar  for 8 minutes.   X= performed this date as previously outlined    HEP  On initial eval, patient education was provided on exam findings, treatment diagnosis, treatment plan, expectations, and prognosis. Patient was also provided recommendations for use of orthosis.    HEP/Education topic Date Issued Date modified Date discharged Comments    PROM, AROM within orthosis 9/9/24      Light prehension with orthosis on 9/23/24                             ASSESSMENT  Wrist flexion has continued to improve.  Thumb deficits are most limiting to patient, as he cannot fully oppose and lacks normal sensation. Difficulty obtaining opposition and practiced place/hold into opposition and attempted use of strap to place thumb in better functional position.          Goals: (to be met in 10 visits)  Patient will be independent and compliant with  comprehensive HEP to maintain progress achieved in OT. (Progressing)  Patient will present with increase in left digit 2-5 CALDWELL to 200 to allow for ease with gripping toothbrush. (Met)  New Goal: Patient will present with full digital extension with wrist in at least 40 degrees of extension, demonstrating increase in scar/soft tissue flexibility. (Progressing)  Patient will report no more than 1/10 pain in left hand/arm during self care activities.(Progressing)  Patient will present with  strength in the left hand to that of 50% of the contralateral hand in order to be able to perform gripping of sports tools, bike handles. (Progressing)  Patient will demonstrate an improvement in the self rated DASH score to 25% or better to reflect functional gains reported, in the achievement of opening a jar, playing sports and in daily skills. (Progressing)  Patient will demonstrate individual finger movements for typing with 75% accuracy. (Progressing)  Patient will present with sufficient ROM and strength in the left hand/arm to return to work, self care, homemaking and leisure skill independent performance. (Progressing)    Additional goals may be established as clinically indicated.    PLAN:  Frequency / Duration: Patient will be seen for 2 x/week or a total of 10 visits over a 90 day period.  Treatment will include: Manual Therapy, Neuromuscular Re-education, Self-Care Home Management, Therapeutic Activities, Therapeutic Exercise, Home Exercise Program instruction, Modalities to include: Ultrasound and hot packs, FT, and taping and custom splinting.    Next session: per protocol, continue US, FT if able; begin putty for  and pinch per protocol (8 weeks post op); monitor thumb position and motion. Possible taping to thumb for opposition    Charges:   1 MT, 1 TA,1 US   Total Timed Treatment: 45 minutes    Total Treatment Time: 45 minutes  Marita Escalera OTR/L

## 2024-10-03 ENCOUNTER — OFFICE VISIT (OUTPATIENT)
Dept: OCCUPATIONAL MEDICINE | Age: 59
End: 2024-10-03
Attending: ORTHOPAEDIC SURGERY
Payer: COMMERCIAL

## 2024-10-03 PROCEDURE — 97140 MANUAL THERAPY 1/> REGIONS: CPT

## 2024-10-03 PROCEDURE — 97530 THERAPEUTIC ACTIVITIES: CPT

## 2024-10-03 PROCEDURE — 97035 APP MDLTY 1+ULTRASOUND EA 15: CPT

## 2024-10-03 NOTE — PROGRESS NOTES
Occupational Therapy Daily Note    Diagnosis:        Arm laceration, left, initial encounter (S41.112A)  %, FDS MF 90%, RF FDS 10%, %, median nerve partial, cutaneous br palm lacerated.    Referring Provider: Tyson  Date of Evaluation:    9/9/24    Precautions:   per protocol   Next MD/PA visit: 10/4/24  DOI: unknown  Date of Surgery: 8/17/24  Procedure Performed:   LEFT FOREARM WOUND EXPLORATION, REPAIR OF TENDON AND MEDIAN NERVE   Insurance Primary/Secondary: [employer] [place]PO / N/A     # Auth Visits: n/a            Orders:   Order Date:  9/9/2024  Authorizing Provider:   KJ ROA     Procedure:  OP REFERRAL TO [place] [facility] [[id number]]         Order #:   [id number]  Qty:  1     Priority:  Routine                   Class:   IHP - RFL     Standing Interval:          Standing Occurrences:          Expires on:            Expected by:    Associated DX:  Arm laceration, left, initial encounter (S41.112A)     Order summary:  IHP - RFL, Routine, 8 visits  Occupational  Therapy Area of Concentration: Orthopedic  Occupational Therapy Ortho: UE  If the patient can be seen sooner with a PT vs an OT, can we cancel this order and replace with a PT order? No         Comments:  Tera Women Flexor Tendon Protocol (FCR and FDS zone 5 repair)          SUBJECTIVE:  \"I couldn't get that strap applied by myself.\"    Pain: states no pain, but numbness is uncomfortable.    OBJECTIVE:    OBSERVATION:       OUTCOME MEASURE   On Initial Evaluation:  QuickDASH Outcome Score  Score: 81.82 % (9/9/2024  4:51 PM)      SCAR: Adhered Mod     SENSORY: Numbness: Yes, median nerve distribution  Joint position sense: presumed impaired          Date: 9/20/24  LEFT  Digital AROM (°)  Index Finger  Middle Finger  Ring Finger  Small Finger    MP Ext/Flex  25/75 25/80 0/75 0/80   PIP Ext/Flex  0/80 0/85 0/90 0/90   DIP Ext/Flex  0/50 0/55 0/45 0/60   CALDWELL  180 195 210 230       Date: 9/27/24  LEFT  Digital AROM (°)  Index Finger   Middle Finger  Ring Finger  Small Finger    MP Ext/Flex  5/85 5/90 0/85 0/90   PIP Ext/Flex  0/95 0/95 0/90 0/95   DIP Ext/Flex  0/65 0/65 0/60 0/75   CALDWELL  240 245 235 260         Wrist AROM  9/20/24: ext 40, flexion 40  9/27/24: ext 60, flexion 60  10/1/24: ext 60, flexion 70    9/20/24Thumb AROM: Opposition not true, achieves to [place] 10 with flexion of CMC, MP and IPJs       Strength (lbs)  Right/Left Right  9/9/24 Left   9/9/24    NT NT   3 Point Pinch  NT NT   Lateral Pinch NT NT   NT= Not tested due to acuity           Date 9/9/24  9/10/24  9/17/24 9/20/24 9/23/24 9/27/24 10/1/24 10/3/24   /Visit # 1  2  3 4 5 6 7 8   # of visits authorized:            # of visits in OT POC:  16 16 16 16 16 16 16 16   Evaluation Initial X          Progress Report written             Manual Therapy              Scar massage taught  x  x x x       STM   x   x x x  X retraction x   IASTM  Tuning fork, stone scraper x x Suction cup scar extractor Scar extractor With namita hook Suction cup scar extractor                             Ther ex               PROM x digits in orthosis x  x  x        PROM x wrist by therapist, tenodesis motion    x  x        Place and hold:  Hook, straight and full fist   x  x         PROM into wrist flexion with gravity assist and active ext within orthosis x  x  x    opposition     Intrinsic stretches     x         Fluidotherapy        Fluidotherapy for 10 minutes to left hand, with AROM x wrist and digits performed.   Fluidotherapy for 10 minutes to left hand, with AROM x wrist and digits performed.      flexbar        Yellow, rolling over scars, for finger ext      Thumb web space stretches       x x                 HEP/  Patient education topics See HEP notes below  see Pt instructions, place and hold  skin protection for sensory impairments Skin care Scar  OT POC Sensory re-education    Therapeutic Activity             Light prehension   Cotton balls, sponge pieces, cards, sponge  rotation Stones, FMC game, corn cob Large beads, tennis ball on Frisbee, corn cob rolling, maze ball Targeted tapping, maze ball, soft large ball Manipulating medium match 4 Gross grasp and digit isolation, coordination placing animal beads on their feet                                                                     Self care training            Objective measurements taken and reviewed with patient   X      x  x x x   Neuromuscular Re-education                Sensory re-ed  Educated pt on skin protections Rice marble sifting, beans, popcorn  (FT) Rice marble sifting without vision Verbalizing sensation on poker chip                                     Orthotic fitting and training See above fabrication of custom orthosis Added third FA strap due to scar tissue  Fabricated custom thumb short opp for night use Reheated and remolded short opponens for daytime; made night first webspace spacer orthosis  Strap for opposition positioning Reformed opposition splint as edema has decreased and splint is too large   Taping   Deferred due to lotioned arm Rock tape applied in space correction over scars       Modalities  Hot pack 5  minutes Hot pack 5 minutes Hot pack 5 minutes  Ultrasound:  3mHz  0.8w/cm2  50%  to volar distal scar  for 8 minutes.   Ultrasound:  3mHz  0.8w/cm2  50%  to volar distal scar  for 8 minutes. Ultrasound:  3mHz  0.8w/cm2  50%  to volar distal scar  for 8 minutes.   X= performed this date as previously outlined    HEP  On initial eval, patient education was provided on exam findings, treatment diagnosis, treatment plan, expectations, and prognosis. Patient was also provided recommendations for use of orthosis.    HEP/Education topic Date Issued Date modified Date discharged Comments    PROM, AROM within orthosis 9/9/24      Light prehension with orthosis on 9/23/24                             ASSESSMENT  Thumb deficits are most limiting to patient, as he cannot fully oppose and lacks normal  sensation. Refitted short opponens splint for improved positioning as splint had become too loose. Improvement in functional grasp/pinch noted for coordination tasks following reformation.       Goals: (to be met in 10 visits)  Patient will be independent and compliant with comprehensive HEP to maintain progress achieved in OT. (Progressing)  Patient will present with increase in left digit 2-5 CALDWELL to 200 to allow for ease with gripping toothbrush. (Met)  New Goal: Patient will present with full digital extension with wrist in at least 40 degrees of extension, demonstrating increase in scar/soft tissue flexibility. (Progressing)  Patient will report no more than 1/10 pain in left hand/arm during self care activities.(Progressing)  Patient will present with  strength in the left hand to that of 50% of the contralateral hand in order to be able to perform gripping of sports tools, bike handles. (Progressing)  Patient will demonstrate an improvement in the self rated DASH score to 25% or better to reflect functional gains reported, in the achievement of opening a jar, playing sports and in daily skills. (Progressing)  Patient will demonstrate individual finger movements for typing with 75% accuracy. (Progressing)  Patient will present with sufficient ROM and strength in the left hand/arm to return to work, self care, homemaking and leisure skill independent performance. (Progressing)    Additional goals may be established as clinically indicated.    PLAN:  Frequency / Duration: Patient will be seen for 2 x/week or a total of 10 visits over a 90 day period.  Treatment will include: Manual Therapy, Neuromuscular Re-education, Self-Care Home Management, Therapeutic Activities, Therapeutic Exercise, Home Exercise Program instruction, Modalities to include: Ultrasound and hot packs, FT, and taping and custom splinting.    Next session: per protocol, continue US, FT if able; begin putty for  and pinch per protocol (8  weeks post op); monitor thumb position and motion. Possible taping to thumb for opposition    Charges:   1 MT, 1 TA,1 US   Total Timed Treatment: 45 minutes    Total Treatment Time: 45 minutes  SARAH Fleming/L

## 2024-10-04 ENCOUNTER — OFFICE VISIT (OUTPATIENT)
Dept: ORTHOPEDICS CLINIC | Facility: CLINIC | Age: 59
End: 2024-10-04
Payer: COMMERCIAL

## 2024-10-04 VITALS — HEIGHT: 71 IN | WEIGHT: 167 LBS | BODY MASS INDEX: 23.38 KG/M2

## 2024-10-04 DIAGNOSIS — S41.112A ARM LACERATION, LEFT, INITIAL ENCOUNTER: Primary | ICD-10-CM

## 2024-10-04 PROCEDURE — 99024 POSTOP FOLLOW-UP VISIT: CPT | Performed by: ORTHOPAEDIC SURGERY

## 2024-10-04 PROCEDURE — 3008F BODY MASS INDEX DOCD: CPT | Performed by: ORTHOPAEDIC SURGERY

## 2024-10-04 NOTE — PROGRESS NOTES
Clinic Note     Assessment/Plan:  59 year old male    Status post left wrist FCR, FDS middle finger, and segmental partial median nerve laceration repair on 8/17/2024-continue therapy to work on strengthening and scar tissue massage.  Continue to follow for sensory and motor median nerve recovery.  I did discuss a possible tendon transfer for opposition if nerve recovery does not occur.    Follow Up: 6 weeks    Diagnostic Studies:     None       Physical Exam:     There were no vitals taken for this visit.    Constitutional: NAD. AOx3. Well-developed and Well-nourished.   Psychiatric: Normal mood/ affect/ behavior. Judgment and thought content normal.     Left Upper Extremity:     Inspection    Skin incisions well-healed without signs of infection   Palpation    Tinel's at the proximal laceration site      ROM    Normal symmetric elbow motion.    Wrist motion deferred    Finger motion one third composite fist     Neurovascular    Normal sensation in ulnar, and radial nerve distribution. Normal motor function of muscles innervated by AIN, ulnar, and radial/PIN nerves.    Thumb radial 80% numbness  Thumb ulnar 50% numbness  Index finger radial 40% numbness  Index finger ulnar 40% numbness  Middle finger radial 90% numbness  Middle finger ulnar 90% numbness  Ring finger radial 70% numbness    Lack of APB function    Normally perfused hand(s).     Special    None          CC: Status post flexor tendon and median nerve repair on 8/17/2024    HPI: This 59 year old RHD male doing well postoperatively with minimal pain at this point.  He has been adherent to the splinting.  Notes some buzzing in the hand    Interval Hx (10/4/2024): Patient progressing from a therapy standpoint.  Minimal to no pain currently.  Numbness has not changed.  Notes thumb dysfunction      History/Other:   Past Medical History:    Anxiety    Back pain    Depression    Hematuria    Nerve damage     Past Surgical History:   Procedure Laterality  Date    Arthroscopy of joint unlisted      left knee scope    Colonoscopy N/A 1/29/2016    Procedure: COLONOSCOPY;  Surgeon: Belem Tobias MD;  Location:  ENDOSCOPY     Current Outpatient Medications   Medication Sig Dispense Refill    [START ON 10/5/2024] LORazepam 0.5 MG Oral Tab Take 1 tablet (0.5 mg total) by mouth nightly as needed for Anxiety. 30 tablet 0    [START ON 10/5/2024] LORazepam 2 MG Oral Tab Take 1 tablet (2 mg total) by mouth nightly. 30 tablet 0    [START ON 10/5/2024] QUEtiapine 200 MG Oral Tab Take 1 tablet (200 mg total) by mouth nightly. 30 tablet 0    [START ON 10/5/2024] sertraline 100 MG Oral Tab Take 1 tablet (100 mg total) by mouth daily. 30 tablet 0    fluticasone propionate 50 MCG/ACT Nasal Suspension 1 spray by Nasal route every 6 (six) hours as needed for Rhinitis.      cetirizine 10 MG Oral Tab Take 1 tablet (10 mg total) by mouth daily.      RYALTRIS 665-25 MCG/ACT Nasal Suspension 1 spray by Nasal route 2 (two) times daily.      cetirizine 10 MG Oral Tab Take 1 tablet (10 mg total) by mouth daily. (Patient not taking: Reported on 7/31/2024) 90 tablet 0    Multiple Vitamin (MULTI VITAMIN MENS) Oral Tab Take 1 tablet by mouth daily.       No Known Allergies  Family History   Problem Relation Age of Onset    Lipids Father     Hypertension Father     Cancer Father     Other (HTN) Father     Suicide History Mother     Depression Mother     Other (Other) Brother      Social History     Occupational History    Not on file   Tobacco Use    Smoking status: Never    Smokeless tobacco: Never   Vaping Use    Vaping status: Never Used   Substance and Sexual Activity    Alcohol use: Not Currently     Comment: \"a few\"    Drug use: Never    Sexual activity: Not on file          Review of Systems (negative unless bolded):  General: fevers, chills, fatigue  CV:  chest pain, palpitations, leg swelling  Msk: bodyaches, neck pain, neck stiffness  Skin: rashes, open wounds, nonhealing ulcers  Hem:  bleeds easily, bruise easily, immunocompromised  Neuro: dizziness, light headedness, headaches  Psych: anxious, depressed, anger issues      Martell Mehta MD   Hand, Wrist, & Elbow Surgery  [email or web address]  t: [phone]  f: [phone]

## 2024-10-08 ENCOUNTER — OFFICE VISIT (OUTPATIENT)
Dept: OCCUPATIONAL MEDICINE | Age: 59
End: 2024-10-08
Attending: ORTHOPAEDIC SURGERY
Payer: COMMERCIAL

## 2024-10-08 PROCEDURE — 97140 MANUAL THERAPY 1/> REGIONS: CPT

## 2024-10-08 PROCEDURE — 97035 APP MDLTY 1+ULTRASOUND EA 15: CPT

## 2024-10-08 PROCEDURE — 97530 THERAPEUTIC ACTIVITIES: CPT

## 2024-10-08 NOTE — PROGRESS NOTES
Occupational Therapy Daily Note    Diagnosis:        Arm laceration, left, initial encounter (S41.112A)  %, FDS MF 90%, RF FDS 10%, %, median nerve partial, cutaneous br palm lacerated.    Referring Provider: Tyson  Date of Evaluation:    9/9/24    Precautions:   per protocol   Next MD/PA visit:   DOI: unknown  Date of Surgery: 8/17/24  Procedure Performed:   LEFT FOREARM WOUND EXPLORATION, REPAIR OF TENDON AND MEDIAN NERVE   Insurance Primary/Secondary: [employer] PPO / N/A     # Auth Visits: n/a            Orders:   Order Date:  9/9/2024  Authorizing Provider:   KJ ROA     Procedure:  OP REFERRAL TO [facility] [[id number]]         Order #:   [id number]  Qty:  1     Priority:  Routine                   Class:   IHP - RFL     Standing Interval:          Standing Occurrences:          Expires on:            Expected by:    Associated DX:  Arm laceration, left, initial encounter (S41.112A)     Order summary:  IHP - RFL, Routine, 8 visits  Occupational  Therapy Area of Concentration: Orthopedic  Occupational Therapy Ortho: UE  If the patient can be seen sooner with a PT vs an OT, can we cancel this order and replace with a PT order? No         Comments:  [facility] Flexor Tendon Protocol (FCR and FDS zone 5 repair)          SUBJECTIVE:  \"The thumb is still the biggest issue.  The doctor said that nerve will take a while to start working.\"    OBJECTIVE:    OBSERVATION:       OUTCOME MEASURE   On Initial Evaluation:  QuickDASH Outcome Score  Score: 81.82 % (9/9/2024  4:51 PM)      SCAR: Adhered Mod     SENSORY: Numbness: Yes, median nerve distribution  Joint position sense: presumed impaired          Date: 9/20/24  LEFT  Digital AROM (°)  Index Finger  Middle Finger  Ring Finger  Small Finger    MP Ext/Flex  25/75 25/80 0/75 0/80   PIP Ext/Flex  0/80 0/85 0/90 0/90   DIP Ext/Flex  0/50 0/55 0/45 0/60   CALDWELL  180 195 210 230       Date: 9/27/24  LEFT  Digital AROM (°)  Index Finger   Middle Finger  Ring Finger  Small Finger    MP Ext/Flex  5/85 5/90 0/85 0/90   PIP Ext/Flex  0/95 0/95 0/90 0/95   DIP Ext/Flex  0/65 0/65 0/60 0/75   CALDWELL  240 245 235 260         Wrist AROM  9/20/24: ext 40, flexion 40  9/27/24: ext 60, flexion 60  10/1/24: ext 60, flexion 70    9/20/24Thumb AROM: Opposition not true, achieves to [place] 10 with flexion of CMC, MP and IPJs       Strength (lbs)  Right/Left Right  9/9/24 Left   9/9/24    NT NT   3 Point Pinch  NT NT   Lateral Pinch NT NT   NT= Not tested due to acuity           Date 9/9/24  9/10/24  9/17/24 9/20/24 9/23/24 9/27/24 10/1/24 10/3/24 10/8/24   /Visit # 1  2  3 4 5 6 7 8 9   # of visits authorized:             # of visits in OT POC:  16 16 16 16 16 16 16 16 16   Evaluation Initial X           Progress Report written           x   Manual Therapy               Scar massage taught  x  x x x    x    STM   x   x x x  X retraction x x   IASTM  Tuning fork, stone scraper x x Suction cup scar extractor Scar extractor With namita hook Suction cup scar extractor                                Ther ex                PROM x digits in orthosis x  x  x         PROM x wrist by therapist, tenodesis motion    x  x         Place and hold:  Hook, straight and full fist   x  x          PROM into wrist flexion with gravity assist and active ext within orthosis x  x  x    opposition      Intrinsic stretches     x      x    Fluidotherapy        Fluidotherapy for 10 minutes to left hand, with AROM x wrist and digits performed.   Fluidotherapy for 10 minutes to left hand, with AROM x wrist and digits performed.       flexbar        Yellow, rolling over scars, for finger ext       Thumb web space stretches       x x x                  HEP/  Patient education topics See HEP notes below  see Pt instructions, place and hold  skin protection for sensory impairments Skin care Scar  OT POC Sensory re-education  Joint positioning exercises   Therapeutic Activity               Light prehension   Cotton balls, sponge pieces, cards, sponge rotation Stones, FMC game, corn cob Large beads, tennis ball on Frisbee, corn cob rolling, maze ball Targeted tapping, maze ball, soft large ball Manipulating medium match 4 Gross grasp and digit isolation, coordination placing animal beads on their feet Blokus for gross grasp            Thumb numbers                                                               Self care training             Objective measurements taken and reviewed with patient   X      x  x x x    Neuromuscular Re-education                 Sensory re-ed  Educated pt on skin protections Rice marble sifting, beans, popcorn  (FT) Rice marble sifting without vision Verbalizing sensation on poker chip  Bilateral coordination place/hold to thumb                                       Orthotic fitting and training See above fabrication of custom orthosis Added third FA strap due to scar tissue  Fabricated custom thumb short opp for night use Reheated and remolded short opponens for daytime; made night first webspace spacer orthosis  Strap for opposition positioning Reformed opposition splint as edema has decreased and splint is too large    Taping   Deferred due to lotioned arm Rock tape applied in space correction over scars     Positioning to thumb in opposition   Modalities  Hot pack 5  minutes Hot pack 5 minutes Hot pack 5 minutes  Ultrasound:  3mHz  0.8w/cm2  50%  to volar distal scar  for 8 minutes.   Ultrasound:  3mHz  0.8w/cm2  50%  to volar distal scar  for 8 minutes. Ultrasound:  3mHz  0.8w/cm2  50%  to volar distal scar  for 8 minutes. Ultrasound:  3mHz  0.8w/cm2  50%  to volar distal scar  for 8 minutes.   X= performed this date as previously outlined    HEP  On initial eval, patient education was provided on exam findings, treatment diagnosis, treatment plan, expectations, and prognosis. Patient was also provided recommendations for use of orthosis.    HEP/Education topic Date  Issued Date modified Date discharged Comments    PROM, AROM within orthosis 9/9/24      Light prehension with orthosis on 9/23/24                             ASSESSMENT  Thumb deficits are most limiting to patient, as he cannot fully oppose and lacks normal sensation. Trial of Kinesiotape for improved thumb position during opposition tasks.  Also practiced bilateral thumb placement tasks for joint position awareness.     Goals: (to be met in 10 visits)  Patient will be independent and compliant with comprehensive HEP to maintain progress achieved in OT. (Progressing)  Patient will present with increase in left digit 2-5 CALDWELL to 200 to allow for ease with gripping toothbrush. (Met)  New Goal: Patient will present with full digital extension with wrist in at least 40 degrees of extension, demonstrating increase in scar/soft tissue flexibility. (Progressing)  Patient will report no more than 1/10 pain in left hand/arm during self care activities.(Progressing)  Patient will present with  strength in the left hand to that of 50% of the contralateral hand in order to be able to perform gripping of sports tools, bike handles. (Progressing)  Patient will demonstrate an improvement in the self rated DASH score to 25% or better to reflect functional gains reported, in the achievement of opening a jar, playing sports and in daily skills. (Progressing)  Patient will demonstrate individual finger movements for typing with 75% accuracy. (Progressing)  Patient will present with sufficient ROM and strength in the left hand/arm to return to work, self care, homemaking and leisure skill independent performance. (Progressing)    Additional goals may be established as clinically indicated.    PLAN:  Frequency / Duration: Patient will be seen for 2 x/week or a total of 10 visits over a 90 day period.  Treatment will include: Manual Therapy, Neuromuscular Re-education, Self-Care Home Management, Therapeutic Activities, Therapeutic  Exercise, Home Exercise Program instruction, Modalities to include: Ultrasound and hot packs, FT, and taping and custom splinting.    Next session: per protocol, continue US, FT if able; begin putty for  and pinch per protocol (8 weeks post op); monitor thumb position and motion. Possible taping to thumb for opposition.  Progress note    Charges:   1 MT, 1 TA,1 US   Total Timed Treatment: 45 minutes    Total Treatment Time: 45 minutes  Marita Escalera OTR/L

## 2024-10-10 ENCOUNTER — OFFICE VISIT (OUTPATIENT)
Dept: OCCUPATIONAL MEDICINE | Facility: HOSPITAL | Age: 59
End: 2024-10-10
Attending: ORTHOPAEDIC SURGERY
Payer: COMMERCIAL

## 2024-10-10 PROCEDURE — 97530 THERAPEUTIC ACTIVITIES: CPT

## 2024-10-10 PROCEDURE — 97140 MANUAL THERAPY 1/> REGIONS: CPT

## 2024-10-10 PROCEDURE — 97035 APP MDLTY 1+ULTRASOUND EA 15: CPT

## 2024-10-10 NOTE — PROGRESS NOTES
Progress Summary  Diagnosis:        Arm laceration, left, initial encounter (S41.112A)  %, FDS MF 90%, RF FDS 10%, %, median nerve partial, cutaneous br palm lacerated.    Referring Provider: Tyson  Date of Evaluation:    9/9/24    Precautions:   per protocol   Next MD/PA visit:   DOI: unknown  Date of Surgery: 8/17/24  Procedure Performed:   LEFT FOREARM WOUND EXPLORATION, REPAIR OF TENDON AND MEDIAN NERVE   Insurance Primary/Secondary: [employer]PO / N/A     # Auth Visits: n/a          Pt has attended 10 visits in Occupational Therapy.     Orders:   Order Date:  9/9/2024  Authorizing Provider:   KJ ROA     Procedure:  OP REFERRAL TO [facility] [[id number]]         Order #:   [id number]  Qty:  1     Priority:  Routine                   Class:   IHP - RFL     Standing Interval:          Standing Occurrences:          Expires on:            Expected by:    Associated DX:  Arm laceration, left, initial encounter (S41.112A)     Order summary:  IHP - RFL, Routine, 8 visits  Occupational  Therapy Area of Concentration: Orthopedic  Occupational Therapy Ortho: UE  If the patient can be seen sooner with a PT vs an OT, can we cancel this order and replace with a PT order? No         Comments:  [facility] Flexor Tendon Protocol (FCR and FDS zone 5 repair)          SUBJECTIVE:  \"I've been working on scar massage at home. I still have numbness along my thumb and limited movement.\"    OBJECTIVE:    OBSERVATION: Patient noted with improving digit flexion/extension ROM and wrist flexion/extension steadily. Limited thumb ROM and sensation due to recovering median nerve that will hopefully improve over time.      OUTCOME MEASURE   On Initial Evaluation:  QuickDASH Outcome Score  Score: 81.82 % (9/9/2024  4:51 PM)      SCAR: Adhered Mod     SENSORY: Numbness: Yes, median nerve distribution  Joint position sense: presumed impaired          Date: 9/20/24  LEFT  Digital AROM (°)  Index Finger   Middle Finger  Ring Finger  Small Finger    MP Ext/Flex  25/75 25/80 0/75 0/80   PIP Ext/Flex  0/80 0/85 0/90 0/90   DIP Ext/Flex  0/50 0/55 0/45 0/60   CALDWELL  180 195 210 230       Date: 9/27/24  LEFT  Digital AROM (°)  Index Finger  Middle Finger  Ring Finger  Small Finger    MP Ext/Flex  5/85 5/90 0/85 0/90   PIP Ext/Flex  0/95 0/95 0/90 0/95   DIP Ext/Flex  0/65 0/65 0/60 0/75   CALDWELL  240 245 235 260     Date: 10/10/24  LEFT  Digital AROM (°)  Index Finger  Middle Finger  Ring Finger  Small Finger    MP Ext/Flex  0/85 5/90 0/85 0/90   PIP Ext/Flex  0/95 0/95 0/90 0/95   DIP Ext/Flex  0/65 0/65 0/60 0/75   CALDWELL  245 245 235 260         Wrist AROM  9/20/24: ext 40, flexion 40  9/27/24: ext 60, flexion 60  10/1/24: ext 60, flexion 70  10/10/24: ext 65, flexion 70    9/20/24 Thumb AROM: Opposition not true, achieves to [place] 10 with flexion of CMC, MP and IPJs       Strength (lbs)  Right/Left Right  9/9/24 Left   9/9/24    NT NT   3 Point Pinch  NT NT   Lateral Pinch NT NT   NT= Not tested due to acuity           Date 9/9/24  9/10/24  9/17/24 9/20/24 9/23/24 9/27/24 10/1/24 10/3/24 10/8/24 10/10/24   /Visit # 1  2  3 4 5 6 7 8 9 10   # of visits authorized:              # of visits in OT POC:  16 16 16 16 16 16 16 16 16 16   Evaluation Initial X            Progress Report written           x    Manual Therapy                Scar massage taught  x  x x x    x x    STM   x   x x x  X retraction x x x   IASTM  Tuning fork, stone scraper x x Suction cup scar extractor Scar extractor With namita hook Suction cup scar extractor                                   Ther ex                 PROM x digits in orthosis x  x  x          PROM x wrist by therapist, tenodesis motion    x  x          Place and hold:  Hook, straight and full fist   x  x           PROM into wrist flexion with gravity assist and active ext within orthosis x  x  x    opposition   opposition    Intrinsic stretches     x      x x    Fluidotherapy         Fluidotherapy for 10 minutes to left hand, with AROM x wrist and digits performed.   Fluidotherapy for 10 minutes to left hand, with AROM x wrist and digits performed.        flexbar        Yellow, rolling over scars, for finger ext        Thumb web space stretches       x x x x                   HEP/  Patient education topics See HEP notes below  see Pt instructions, place and hold  skin protection for sensory impairments Skin care Scar  OT POC Sensory re-education  Joint positioning exercises Joint positioning exercises   Therapeutic Activity               Light prehension   Cotton balls, sponge pieces, cards, sponge rotation Stones, FMC game, corn cob Large beads, tennis ball on Frisbee, corn cob rolling, maze ball Targeted tapping, maze ball, soft large ball Manipulating medium match 4 Gross grasp and digit isolation, coordination placing animal beads on their feet Blokus for gross grasp Chinese  Opposition and In-Hand Manipulation sorting            Thumb numbers Gross Grasp Sorting Marbles                                                                     Self care training              Objective measurements taken and reviewed with patient   X      x  x x x     Neuromuscular Re-education                  Sensory re-ed  Educated pt on skin protections Rice marble sifting, beans, popcorn  (FT) Rice marble sifting without vision Verbalizing sensation on poker chip  Bilateral coordination place/hold to thumb                                           Orthotic fitting and training See above fabrication of custom orthosis Added third FA strap due to scar tissue  Fabricated custom thumb short opp for night use Reheated and remolded short opponens for daytime; made night first webspace spacer orthosis  Strap for opposition positioning Reformed opposition splint as edema has decreased and splint is too large     Taping   Deferred due to lotioned arm Rock tape applied in space correction over scars      Positioning to thumb in opposition Positioning to thumb in opposition   Modalities  Hot pack 5  minutes Hot pack 5 minutes Hot pack 5 minutes  Ultrasound:  3mHz  0.8w/cm2  50%  to volar distal scar  for 8 minutes.   Ultrasound:  3mHz  0.8w/cm2  50%  to volar distal scar  for 8 minutes. Ultrasound:  3mHz  0.8w/cm2  50%  to volar distal scar  for 8 minutes. Ultrasound:  3mHz  0.8w/cm2  50%  to volar distal scar  for 8 minutes. Ultrasound:  3mHz  0.8w/cm2  50%  to volar distal scar  for 8 minutes.   X= performed this date as previously outlined    HEP  On initial eval, patient education was provided on exam findings, treatment diagnosis, treatment plan, expectations, and prognosis. Patient was also provided recommendations for use of orthosis.    HEP/Education topic Date Issued Date modified Date discharged Comments    PROM, AROM within orthosis 9/9/24      Light prehension with orthosis on 9/23/24                             ASSESSMENT  Patient progressing steadily with post-operative protocol demonstrating normalizing ability to make full fist and wrist flexion/extension. Despite this, due to limited median nerve innervation return patient limited with sensation and thumb ROM for functional opposition activities. Patient will be at 8 weeks post-op starting 10/12/2024 to start strengthening. Patient would highly benefit from continued OT services to maximize strengthening, scar management, and recovery s/p laceration injury.    Goals: (to be met in 10 visits)  Patient will be independent and compliant with comprehensive HEP to maintain progress achieved in OT. (Progressing)  Patient will present with increase in left digit 2-5 CALDWELL to 200 to allow for ease with gripping toothbrush. (Met)  New Goal: Patient will present with full digital extension with wrist in at least 40 degrees of extension, demonstrating increase in scar/soft tissue flexibility. (Progressing)  Patient will report no more than 1/10 pain in left  hand/arm during self care activities.(Progressing)  Patient will present with  strength in the left hand to that of 50% of the contralateral hand in order to be able to perform gripping of sports tools, bike handles. (Progressing)  Patient will demonstrate an improvement in the self rated DASH score to 25% or better to reflect functional gains reported, in the achievement of opening a jar, playing sports and in daily skills. (Progressing)  Patient will demonstrate individual finger movements for typing with 75% accuracy. (Progressing)  Patient will present with sufficient ROM and strength in the left hand/arm to return to work, self care, homemaking and leisure skill independent performance. (Progressing)    Additional goals may be established as clinically indicated.    Patient/Family/Caregiver was advised of these findings, precautions, and treatment options and has agreed to actively participate in planning and for this course of care.    Thank you for your referral. If you have any questions, please contact me at Dept: [phone].    Sincerely,  Electronically signed by therapist: Irwin Cortez, OT    Physician's certification required: Yes  Please co-sign or sign and return this letter via fax as soon as possible to [phone].   I certify the need for these services furnished under this plan of treatment and while under my care.    X___________________________________________________ Date____________________    PLAN:  Frequency / Duration: Patient will be seen for 2 x/week or a total of 20 visits over a 90 day period.  Treatment will include: Manual Therapy, Neuromuscular Re-education, Self-Care Home Management, Therapeutic Activities, Therapeutic Exercise, Home Exercise Program instruction, Modalities to include: Ultrasound and hot packs, FT, and taping and custom splinting.    Next session: per protocol, continue US, FT if able; begin putty for  and pinch per protocol (8 weeks post op); monitor thumb  position and motion. Possible taping to thumb for opposition.  Progress note    Charges:   1 MT, 1 TA,1 US   Total Timed Treatment: 45 minutes    Total Treatment Time: 45 minutes  Irwin Cortez OTR/L

## 2024-10-14 NOTE — PROGRESS NOTES
Diagnosis:        Arm laceration, left, initial encounter (S41.112A)  %, FDS MF 90%, RF FDS 10%, %, median nerve partial, cutaneous br palm lacerated.    Referring Provider: Tyson  Date of Evaluation:    9/9/24    Precautions:   per protocol   Next MD/PA visit: 11/15/24  DOI: unknown  Date of Surgery: 8/17/24  Procedure Performed:   LEFT FOREARM WOUND EXPLORATION, REPAIR OF TENDON AND MEDIAN NERVE   Insurance Primary/Secondary: [employer] [place] PPO / N/A     # Auth Visits: n/a              Orders:   Order Date:  9/9/2024  Authorizing Provider:   KJ ROA     Procedure:  OP REFERRAL TO [facility] [[id number]]         Order #:   [id number]  Qty:  1     Priority:  Routine                   Class:   IHP - RFL     Standing Interval:          Standing Occurrences:          Expires on:            Expected by:    Associated DX:  Arm laceration, left, initial encounter (S41.112A)     Order summary:  IHP - RFL, Routine, 8 visits  Occupational  Therapy Area of Concentration: Orthopedic  Occupational Therapy Ortho: UE  If the patient can be seen sooner with a PT vs an OT, can we cancel this order and replace with a PT order? No         Comments:  Tera Women Flexor Tendon Protocol (FCR and FDS zone 5 repair)          SUBJECTIVE:  Reports concern about nerve scarring.  Reports orthosis not quite fitting or comfortable still.    Pain: 2/10 \"mild\"    OBJECTIVE:        OUTCOME MEASURE   On Initial Evaluation:  QuickDASH Outcome Score  Score: 81.82 % (9/9/2024  4:51 PM)      Interim  Post QuickDASH Outcome Score  Post Score: 50 % (10/15/2024 11:06 AM)  31.82 % improvement        Date: 9/20/24  LEFT  Digital AROM (°)  Index Finger  Middle Finger  Ring Finger  Small Finger    MP Ext/Flex  25/75 25/80 0/75 0/80   PIP Ext/Flex  0/80 0/85 0/90 0/90   DIP Ext/Flex  0/50 0/55 0/45 0/60   CALDWELL  180 195 210 230       Date: 9/27/24  LEFT  Digital AROM (°)  Index Finger  Middle Finger  Ring Finger  Small Finger     MP Ext/Flex  5/85 5/90 0/85 0/90   PIP Ext/Flex  0/95 0/95 0/90 0/95   DIP Ext/Flex  0/65 0/65 0/60 0/75   CALDWELL  240 245 235 260     Date: 10/10/24  LEFT  Digital AROM (°)  Index Finger  Middle Finger  Ring Finger  Small Finger    MP Ext/Flex  0/85 5/90 0/85 0/90   PIP Ext/Flex  0/95 0/95 0/90 0/95   DIP Ext/Flex  0/65 0/65 0/60 0/75   CALDWELL  245 245 235 260         Wrist AROM  9/20/24: ext 40, flexion 40  9/27/24: ext 60, flexion 60  10/1/24: ext 60, flexion 70  10/10/24: ext 65, flexion 70    9/20/24 Thumb AROM: Opposition not true, achieves to [place] 10 with flexion of CMC, MP and IPJs       Strength (lbs)  Right/Left Right  9/9/24 Left   9/9/24 Right/left 10/15/24    NT NT 60, 65, 65/30, 39, 40   3 Point Pinch  NT NT 17/5   Lateral Pinch NT NT 12/7   NT= Not tested due to acuity           Date 9/23/24 9/27/24 10/1/24 10/3/24 10/8/24 10/10/24 10/15/24   /Visit # 5 6 7 8 9 10 11   # of visits authorized:           # of visits in OT POC:  16 16 16 16 16 16 16   Evaluation          Progress Report written     x     Manual Therapy          Scar massage x    x x x    STM x  X retraction x x x x   IASTM Suction cup scar extractor Scar extractor With namita hook Suction cup scar extractor                           Ther ex           PROM x digits in orthosis          PROM x wrist by therapist, tenodesis motion          Place and hold:  Hook, straight and full fist           PROM into wrist flexion with gravity assist and active ext within orthosis   opposition   opposition     Intrinsic stretches     x x     Fluidotherapy Fluidotherapy for 10 minutes to left hand, with AROM x wrist and digits performed.   Fluidotherapy for 10 minutes to left hand, with AROM x wrist and digits performed.      for 10 minutes to left hand, with AROM x wrist and digits performed.    flexbar Yellow, rolling over scars, for finger ext         Thumb web space stretches   x x x x     strengthening       Cook putty   Pinch  strengthening       Cook putty   Wrist PRE's           HEP/  Patient education topics Scar  OT POC Sensory re-education  Joint positioning exercises Joint positioning exercises Issued soft tan putty for , roll, pinch   Therapeutic Activity          Light prehension Large beads, tennis ball on Frisbee, corn cob rolling, maze ball Targeted tapping, maze ball, soft large ball Manipulating medium match 4 Gross grasp and digit isolation, coordination placing animal beads on their feet Blokus for gross grasp Chinese  Opposition and In-Hand Manipulation sorting         Thumb numbers Gross Grasp Sorting Marbles     flexbar          Power web          Bulk putty                              Self care training           Objective measurements taken and reviewed with patient  x x x   x   Neuromuscular Re-education          Sensory re-ed (FT) Rice marble sifting without vision Verbalizing sensation on poker chip  Bilateral coordination place/hold to thumb  (FT)                                 Orthotic fitting and training Reheated and remolded short opponens for daytime; made night first webspace spacer orthosis  Strap for opposition positioning Reformed opposition splint as edema has decreased and splint is too large   Reheated and remolded orthosis   Taping     Positioning to thumb in opposition Positioning to thumb in opposition    Modalities  Ultrasound:  3mHz  0.8w/cm2  50%  to volar distal scar  for 8 minutes.   Ultrasound:  3mHz  0.8w/cm2  50%  to volar distal scar  for 8 minutes. Ultrasound:  3mHz  0.8w/cm2  50%  to volar distal scar  for 8 minutes. Ultrasound:  3mHz  0.8w/cm2  50%  to volar distal scar  for 8 minutes. Ultrasound:  3mHz  0.8w/cm2  50%  to volar distal scar  for 8 minutes. Ultrasound:  3mHz  0.8w/cm2  50%  to volar distal scar  for 8 minutes.   X= performed this date as previously outlined    HEP  On initial eval, patient education was provided on exam findings, treatment diagnosis, treatment  plan, expectations, and prognosis. Patient was also provided recommendations for use of orthosis.    HEP/Education topic Date Issued Date modified Date discharged Comments    PROM, AROM within orthosis 9/9/24      Light prehension with orthosis on 9/23/24      Soft putty issued for pinch and  10/15/24                      ASSESSMENT  Patient presents with weakness in hand as noted above. FMC is impaired, due, in part, to shakiness.  Median nerve palsy continues most bothersome to patient. Need to focus on function and encouraging self management.  May need to begin motor imagery.    Goals: (to be met in 10 visits)  Patient will be independent and compliant with comprehensive HEP to maintain progress achieved in OT. (Progressing)  Patient will present with increase in left digit 2-5 CALDWELL to 200 to allow for ease with gripping toothbrush. (Met)  New Goal: Patient will present with full digital extension with wrist in at least 40 degrees of extension, demonstrating increase in scar/soft tissue flexibility. (Progressing)  Patient will report no more than 1/10 pain in left hand/arm during self care activities.(Progressing)  Patient will present with  strength in the left hand to that of 50% of the contralateral hand in order to be able to perform gripping of sports tools, bike handles. (Progressing)  Patient will demonstrate an improvement in the self rated DASH score to 25% or better to reflect functional gains reported, in the achievement of opening a jar, playing sports and in daily skills. (Progressing)  Patient will demonstrate individual finger movements for typing with 75% accuracy. (Progressing)  Patient will present with sufficient ROM and strength in the left hand/arm to return to work, self care, homemaking and leisure skill independent performance. (Progressing)    Additional goals may be established as clinically indicated.        PLAN:  Frequency / Duration: Patient will be seen for 2 x/week or a  total of 20 visits over a 90 day period.  Treatment will include: Manual Therapy, Neuromuscular Re-education, Self-Care Home Management, Therapeutic Activities, Therapeutic Exercise, Home Exercise Program instruction, Modalities to include: Ultrasound and hot packs, FT, and taping and custom splinting.    Next session: per protocol, continue US, FT if able; begin putty for  and pinch per protocol (8 weeks post op); monitor thumb position and motion. Possible taping to thumb for opposition.     Charges:   1 MT, 1 TE, 1 Orthotic fitting and training, 1 US  Total Timed Treatment: 49 minutes    Total Treatment Time: 49 minutes    JAMEL Dubois, OTR/L, CHT

## 2024-10-15 ENCOUNTER — OFFICE VISIT (OUTPATIENT)
Dept: OCCUPATIONAL MEDICINE | Age: 59
End: 2024-10-15
Attending: ORTHOPAEDIC SURGERY
Payer: COMMERCIAL

## 2024-10-15 PROCEDURE — 97760 ORTHOTIC MGMT&TRAING 1ST ENC: CPT

## 2024-10-15 PROCEDURE — 97140 MANUAL THERAPY 1/> REGIONS: CPT

## 2024-10-15 PROCEDURE — 97110 THERAPEUTIC EXERCISES: CPT

## 2024-10-15 PROCEDURE — 97035 APP MDLTY 1+ULTRASOUND EA 15: CPT

## 2024-10-15 NOTE — PROGRESS NOTES
Diagnosis:        Arm laceration, left, initial encounter (S41.112A)  %, FDS MF 90%, RF FDS 10%, %, median nerve partial, cutaneous br palm lacerated.    Referring Provider: Tyson  Date of Evaluation:    9/9/24    Precautions:   per protocol   Next MD/PA visit: 11/15/24  DOI: unknown  Date of Surgery: 8/17/24  Procedure Performed:   LEFT FOREARM WOUND EXPLORATION, REPAIR OF TENDON AND MEDIAN NERVE   Insurance Primary/Secondary: [employer] [place] PPO / N/A     # Auth Visits: n/a              Orders:   Order Date:  9/9/2024  Authorizing Provider:   KJ ROA     Procedure:  OP REFERRAL TO [facility] [[id number]]         Order #:   [id number]  Qty:  1     Priority:  Routine                   Class:   IHP - RFL     Standing Interval:          Standing Occurrences:          Expires on:            Expected by:    Associated DX:  Arm laceration, left, initial encounter (S41.112A)     Order summary:  IHP - RFL, Routine, 8 visits  Occupational  Therapy Area of Concentration: Orthopedic  Occupational Therapy Ortho: UE  If the patient can be seen sooner with a PT vs an OT, can we cancel this order and replace with a PT order? No         Comments:  Tera Women Flexor Tendon Protocol (FCR and FDS zone 5 repair)          SUBJECTIVE:  C/o orthosis not being comfortable.    Pain: 2/10 \"mild\"    OBJECTIVE:        OUTCOME MEASURE   On Initial Evaluation:  QuickDASH Outcome Score  Score: 81.82 % (9/9/2024  4:51 PM)      Interim  Post QuickDASH Outcome Score  Post Score: 50 % (10/15/2024 11:06 AM)  31.82 % improvement        Date: 9/20/24  LEFT  Digital AROM (°)  Index Finger  Middle Finger  Ring Finger  Small Finger    MP Ext/Flex  25/75 25/80 0/75 0/80   PIP Ext/Flex  0/80 0/85 0/90 0/90   DIP Ext/Flex  0/50 0/55 0/45 0/60   CALDWELL  180 195 210 230       Date: 9/27/24  LEFT  Digital AROM (°)  Index Finger  Middle Finger  Ring Finger  Small Finger    MP Ext/Flex  5/85 5/90 0/85 0/90   PIP Ext/Flex  0/95 0/95  0/90 0/95   DIP Ext/Flex  0/65 0/65 0/60 0/75   CALDWELL  240 245 235 260     Date: 10/10/24  LEFT  Digital AROM (°)  Index Finger  Middle Finger  Ring Finger  Small Finger    MP Ext/Flex  0/85 5/90 0/85 0/90   PIP Ext/Flex  0/95 0/95 0/90 0/95   DIP Ext/Flex  0/65 0/65 0/60 0/75   CALDWELL  245 245 235 260         Wrist AROM  9/20/24: ext 40, flexion 40  9/27/24: ext 60, flexion 60  10/1/24: ext 60, flexion 70  10/10/24: ext 65, flexion 70    9/20/24 Thumb AROM: Opposition not true, achieves to [place] 10 with flexion of CMC, MP and IPJs       Strength (lbs)  Right/Left Right  9/9/24 Left   9/9/24 Right/left 10/15/24    NT NT 60, 65, 65/30, 39, 40   3 Point Pinch  NT NT 17/5   Lateral Pinch NT NT 12/7   NT= Not tested due to acuity           Date 9/27/24 10/1/24 10/3/24 10/8/24 10/10/24 10/15/24 10/17/24   /Visit # 6 7 8 9 10 11 12   # of visits authorized:           # of visits in OT POC:  16 16 16 16 16 16 16   Evaluation          Progress Report written    x      Manual Therapy       4   Scar massage    x x x     STM  X retraction x x x x    IASTM Scar extractor With namita hook Suction cup scar extractor   Late entry: suction scar extractor used Scar extractor applied                        Ther ex       15    PROM x digits in orthosis          PROM x wrist by therapist, tenodesis motion          Place and hold:  Hook, straight and full fist           PROM into wrist flexion with gravity assist and active ext within orthosis  opposition   opposition      Intrinsic stretches    x x      Fluidotherapy Fluidotherapy for 10 minutes to left hand, with AROM x wrist and digits performed.      for 10 minutes to left hand, with AROM x wrist and digits performed. Fluidotherapy for 10 minutes to left hand, with AROM x digits and wrist performed.      flexbar          Thumb web space stretches  x x x x      strengthening      Tan putty    Pinch strengthening      Cook putty    Wrist PRE's       1#, 3 sets of 10 each  direction    HEP/  Patient education topics OT POC Sensory re-education  Joint positioning exercises Joint positioning exercises Issued soft tan putty for , roll, pinch Wrist PRE's, dorsal orthosis   Therapeutic Activity       8   Light prehension Targeted tapping, maze ball, soft large ball Manipulating medium match 4 Gross grasp and digit isolation, coordination placing animal beads on their feet Blokus for gross grasp Chinese  Opposition and In-Hand Manipulation sorting  With orthosis on: stones       Thumb numbers Gross Grasp Sorting Marbles      flexbar       yellow   Power web       Yellow power web   Bulk putty                              Self care training           Objective measurements taken and reviewed with patient x x x   x    Neuromuscular Re-education          Sensory re-ed Rice marble sifting without vision Verbalizing sensation on poker chip  Bilateral coordination place/hold to thumb  (FT)                                  Orthotic fitting and training  Strap for opposition positioning Reformed opposition splint as edema has decreased and splint is too large   Reheated and remolded orthosis Fabricated custom dorsal thumb short opponens orthosis. 10 min   Taping    Positioning to thumb in opposition Positioning to thumb in opposition     Modalities Ultrasound:  3mHz  0.8w/cm2  50%  to volar distal scar  for 8 minutes.   Ultrasound:  3mHz  0.8w/cm2  50%  to volar distal scar  for 8 minutes. Ultrasound:  3mHz  0.8w/cm2  50%  to volar distal scar  for 8 minutes. Ultrasound:  3mHz  0.8w/cm2  50%  to volar distal scar  for 8 minutes. Ultrasound:  3mHz  0.8w/cm2  50%  to volar distal scar  for 8 minutes. Ultrasound:  3mHz  0.8w/cm2  50%  to volar distal scar  for 8 minutes. Ultrasound:  3mHz  0.8w/cm2  100%  to volar entire scar area for 8 minutes.     X= performed this date as previously outlined    HEP  On initial eval, patient education was provided on exam findings, treatment diagnosis,  treatment plan, expectations, and prognosis. Patient was also provided recommendations for use of orthosis.    HEP/Education topic Date Issued Date modified Date discharged Comments    PROM, AROM within orthosis 9/9/24      Light prehension with orthosis on 9/23/24      Soft putty issued for pinch and  10/15/24                      ASSESSMENT  Patient presents with difficulty achieving good, controlled position of thumb to provide functional thumb post. Will try dorsal orthosis and monitor function, but may have to re-try another volar-based one.    Goals: (to be met in 10 visits)  Patient will be independent and compliant with comprehensive HEP to maintain progress achieved in OT. (Progressing)  Patient will present with increase in left digit 2-5 CALDWELL to 200 to allow for ease with gripping toothbrush. (Met)  New Goal: Patient will present with full digital extension with wrist in at least 40 degrees of extension, demonstrating increase in scar/soft tissue flexibility. (Progressing)  Patient will report no more than 1/10 pain in left hand/arm during self care activities.(Progressing)  Patient will present with  strength in the left hand to that of 50% of the contralateral hand in order to be able to perform gripping of sports tools, bike handles. (Progressing)  Patient will demonstrate an improvement in the self rated DASH score to 25% or better to reflect functional gains reported, in the achievement of opening a jar, playing sports and in daily skills. (Progressing)  Patient will demonstrate individual finger movements for typing with 75% accuracy. (Progressing)  Patient will present with sufficient ROM and strength in the left hand/arm to return to work, self care, homemaking and leisure skill independent performance. (Progressing)    Additional goals may be established as clinically indicated.        PLAN:  Frequency / Duration: Patient will be seen for 2 x/week or a total of 20 visits over a 90 day  period.  Treatment will include: Manual Therapy, Neuromuscular Re-education, Self-Care Home Management, Therapeutic Activities, Therapeutic Exercise, Home Exercise Program instruction, Modalities to include: Ultrasound and hot packs, FT, and taping and custom splinting.    Next session: per protocol, continue US, FT    Charges:   1 TA, 1 TE, 1 Orthotic fitting and training, 1 US  Total Timed Treatment: 45 minutes    Total Treatment Time: 45 minutes    JAMEL Dubois, OTR/L, CHT

## 2024-10-17 ENCOUNTER — OFFICE VISIT (OUTPATIENT)
Dept: OCCUPATIONAL MEDICINE | Age: 59
End: 2024-10-17
Attending: ORTHOPAEDIC SURGERY
Payer: COMMERCIAL

## 2024-10-17 PROCEDURE — 97530 THERAPEUTIC ACTIVITIES: CPT

## 2024-10-17 PROCEDURE — 97035 APP MDLTY 1+ULTRASOUND EA 15: CPT

## 2024-10-17 PROCEDURE — 97110 THERAPEUTIC EXERCISES: CPT

## 2024-10-17 PROCEDURE — 97760 ORTHOTIC MGMT&TRAING 1ST ENC: CPT

## 2024-10-17 NOTE — PROGRESS NOTES
Diagnosis:        Arm laceration, left, initial encounter (S41.112A)  %, FDS MF 90%, RF FDS 10%, %, median nerve partial, cutaneous br palm lacerated.    Referring Provider: Tyson  Date of Evaluation:    9/9/24    Precautions:   per protocol   Next MD/PA visit: 11/15/24  DOI: unknown  Date of Surgery: 8/17/24  Procedure Performed:   LEFT FOREARM WOUND EXPLORATION, REPAIR OF TENDON AND MEDIAN NERVE   Insurance Primary/Secondary: [employer] [place] PPO / N/A     # Auth Visits: n/a              Orders:   Order Date:  9/9/2024  Authorizing Provider:   KJ ROA     Procedure:  OP REFERRAL TO [facility] [[id number]]         Order #:   [id number]  Qty:  1     Priority:  Routine                   Class:   IHP - RFL     Standing Interval:          Standing Occurrences:          Expires on:            Expected by:    Associated DX:  Arm laceration, left, initial encounter (S41.112A)     Order summary:  IHP - RFL, Routine, 8 visits  Occupational  Therapy Area of Concentration: Orthopedic  Occupational Therapy Ortho: UE  If the patient can be seen sooner with a PT vs an OT, can we cancel this order and replace with a PT order? No         Comments:  Tera Women Flexor Tendon Protocol (FCR and FDS zone 5 repair)          SUBJECTIVE:  C/o difficulty moving thumb in orthosis.    Pain: 2/10 \"mild\"    OBJECTIVE:        OUTCOME MEASURE   On Initial Evaluation:  QuickDASH Outcome Score  Score: 81.82 % (9/9/2024  4:51 PM)      Interim  Post QuickDASH Outcome Score  Post Score: 50 % (10/15/2024 11:06 AM)  31.82 % improvement        Date: 9/20/24  LEFT  Digital AROM (°)  Index Finger  Middle Finger  Ring Finger  Small Finger    MP Ext/Flex  25/75 25/80 0/75 0/80   PIP Ext/Flex  0/80 0/85 0/90 0/90   DIP Ext/Flex  0/50 0/55 0/45 0/60   CALDWELL  180 195 210 230       Date: 9/27/24  LEFT  Digital AROM (°)  Index Finger  Middle Finger  Ring Finger  Small Finger    MP Ext/Flex  5/85 5/90 0/85 0/90   PIP Ext/Flex  0/95  0/95 0/90 0/95   DIP Ext/Flex  0/65 0/65 0/60 0/75   CALDWELL  240 245 235 260     Date: 10/10/24  LEFT  Digital AROM (°)  Index Finger  Middle Finger  Ring Finger  Small Finger    MP Ext/Flex  0/85 5/90 0/85 0/90   PIP Ext/Flex  0/95 0/95 0/90 0/95   DIP Ext/Flex  0/65 0/65 0/60 0/75   CALDWELL  245 245 235 260         Wrist AROM  9/20/24: ext 40, flexion 40  9/27/24: ext 60, flexion 60  10/1/24: ext 60, flexion 70  10/10/24: ext 65, flexion 70    9/20/24 Thumb AROM: Opposition not true, achieves to [place] 10 with flexion of CMC, MP and IPJs       Strength (lbs)  Right/Left Right  9/9/24 Left   9/9/24 Right/left 10/15/24    NT NT 60, 65, 65/30, 39, 40   3 Point Pinch  NT NT 17/5   Lateral Pinch NT NT 12/7   NT= Not tested due to acuity           Date 10/1/24 10/3/24 10/8/24 10/10/24 10/15/24 10/17/24 10/22/24   /Visit # 7 8 9 10 11 12 13   # of visits authorized:           # of visits in OT POC:  16 16 16 16 16 16 20 updated on PN   Evaluation          Progress Report written   x       Manual Therapy      4 10   Scar massage   x x x  x    STM X retraction x x x x  x   IASTM With namita hook Suction cup scar extractor   Late entry: suction scar extractor used Scar extractor applied x                        Ther ex      15 15   Passive stretches to wrist       Into red power   Hand gripper with pegs       First rung, 2 minutes   Door dusting           PROM into wrist flexion with gravity assist and active ext within orthosis opposition   opposition       Intrinsic stretches   x x       Fluidotherapy      for 10 minutes to left hand, with AROM x wrist and digits performed. Fluidotherapy for 10 minutes to left hand, with AROM x digits and wrist performed.   Fluidotherapy for 10 minutes to left hand, with AROM x digits and wrist performed.   Sci Fit          Thumb web space stretches x x x x       strengthening     Tan putty  (Red power web)   Pinch strengthening     Tan putty  Yellow clip   Wrist PRE's      1#, 3  sets of 10 each direction 2#, 3 sets of 15 each    HEP/  Patient education topics Sensory re-education  Joint positioning exercises Joint positioning exercises Issued soft tan putty for , roll, pinch Wrist PRE's, dorsal orthosis Thumb orthosis objective is to position thumb in static position for function.   Therapeutic Activity      8 12   Light prehension Manipulating medium match 4 Gross grasp and digit isolation, coordination placing animal beads on their feet Blokus for gross grasp Chinese  Opposition and In-Hand Manipulation sorting  With orthosis on: stones       Thumb numbers Gross Grasp Sorting Marbles       flexbar      yellow red   Power web      Yellow power web Red:  15x   Bulk putty       Cook                        Self care training           Objective measurements taken and reviewed with patient x x   x     Neuromuscular Re-education          Sensory re-ed Verbalizing sensation on poker chip  Bilateral coordination place/hold to thumb  (FT)                                   Orthotic fitting and training Strap for opposition positioning Reformed opposition splint as edema has decreased and splint is too large   Reheated and remolded orthosis Fabricated custom dorsal thumb short opponens orthosis. 10 min    Taping   Positioning to thumb in opposition Positioning to thumb in opposition      Modalities Ultrasound:  3mHz  0.8w/cm2  50%  to volar distal scar  for 8 minutes. Ultrasound:  3mHz  0.8w/cm2  50%  to volar distal scar  for 8 minutes. Ultrasound:  3mHz  0.8w/cm2  50%  to volar distal scar  for 8 minutes. Ultrasound:  3mHz  0.8w/cm2  50%  to volar distal scar  for 8 minutes. Ultrasound:  3mHz  0.8w/cm2  50%  to volar distal scar  for 8 minutes. Ultrasound:  3mHz  0.8w/cm2  100%  to volar entire scar area for 8 minutes.   Ultrasound:  3mHz  0.8w/cm2  100%  to volar entire scar area for 8 minutes.   X= performed this date as previously outlined    HEP  On initial eval, patient  education was provided on exam findings, treatment diagnosis, treatment plan, expectations, and prognosis. Patient was also provided recommendations for use of orthosis.    HEP/Education topic Date Issued Date modified Date discharged Comments    PROM, AROM within orthosis 9/9/24      Light prehension with orthosis on 9/23/24      Soft putty issued for pinch and  10/15/24                      ASSESSMENT  Patient presents with persistently adhered scar tissue.  Only real functional deficits are due to limited thumb opposition, as strength and flexibility overall are improving.    Goals: (to be met in 20 visits)  Patient will be independent and compliant with comprehensive HEP to maintain progress achieved in OT. (Progressing)  Patient will present with increase in left digit 2-5 CALDWELL to 200 to allow for ease with gripping toothbrush. (Met)  New Goal: Patient will present with full digital extension with wrist in at least 40 degrees of extension, demonstrating increase in scar/soft tissue flexibility. (Progressing)  Patient will report no more than 1/10 pain in left hand/arm during self care activities.(Progressing)  Patient will present with  strength in the left hand to that of 50% of the contralateral hand in order to be able to perform gripping of sports tools, bike handles. (Progressing)  Patient will demonstrate an improvement in the self rated DASH score to 25% or better to reflect functional gains reported, in the achievement of opening a jar, playing sports and in daily skills. (Progressing)  Patient will demonstrate individual finger movements for typing with 75% accuracy. (Progressing)  Patient will present with sufficient ROM and strength in the left hand/arm to return to work, self care, homemaking and leisure skill independent performance. (Progressing)    Additional goals may be established as clinically indicated.        PLAN:  Frequency / Duration: Patient will be seen for 2 x/week or a total  of 20 visits over a 90 day period.  Treatment will include: Manual Therapy, Neuromuscular Re-education, Self-Care Home Management, Therapeutic Activities, Therapeutic Exercise, Home Exercise Program instruction, Modalities to include: Ultrasound and hot packs, FT, and taping and custom splinting.    Next session: per protocol, continue US, FT, Body Blade, Sci Fit, door dusting    Charges:   1 TA, 1 TE, 1 MT, 1 US  Total Timed Treatment: 45 minutes    Total Treatment Time: 45 minutes    JAMEL Dubois, OTR/L, CHT

## 2024-10-22 ENCOUNTER — OFFICE VISIT (OUTPATIENT)
Dept: OCCUPATIONAL MEDICINE | Age: 59
End: 2024-10-22
Attending: ORTHOPAEDIC SURGERY
Payer: COMMERCIAL

## 2024-10-22 PROCEDURE — 97140 MANUAL THERAPY 1/> REGIONS: CPT

## 2024-10-22 PROCEDURE — 97110 THERAPEUTIC EXERCISES: CPT

## 2024-10-22 PROCEDURE — 97035 APP MDLTY 1+ULTRASOUND EA 15: CPT

## 2024-10-22 PROCEDURE — 97530 THERAPEUTIC ACTIVITIES: CPT

## 2024-10-22 NOTE — PROGRESS NOTES
Diagnosis:        Arm laceration, left, initial encounter (S41.112A)  %, FDS MF 90%, RF FDS 10%, %, median nerve partial, cutaneous br palm lacerated.    Referring Provider: Tyson  Date of Evaluation:    9/9/24    Precautions:   per protocol   Next MD/PA visit: 11/15/24  DOI: unknown  Date of Surgery: 8/17/24  Procedure Performed:   LEFT FOREARM WOUND EXPLORATION, REPAIR OF TENDON AND MEDIAN NERVE   Insurance Primary/Secondary: [employer] [place] PPO / N/A     # Auth Visits: n/a              Orders:   Order Date:  9/9/2024  Authorizing Provider:   KJ ROA     Procedure:  OP REFERRAL TO [facility] [[id number]]         Order #:   [id number]  Qty:  1     Priority:  Routine                   Class:   IHP - RFL     Standing Interval:          Standing Occurrences:          Expires on:            Expected by:    Associated DX:  Arm laceration, left, initial encounter (S41.112A)     Order summary:  IHP - RFL, Routine, 8 visits  Occupational  Therapy Area of Concentration: Orthopedic  Occupational Therapy Ortho: UE  If the patient can be seen sooner with a PT vs an OT, can we cancel this order and replace with a PT order? No         Comments:  Tera Women Flexor Tendon Protocol (FCR and FDS zone 5 repair)          SUBJECTIVE:  Reports \"Fatigue\" and \"a little bit of pain\" after typing for extensive period of time.    Pain: 2/10 \"mild\"    OBJECTIVE:        OUTCOME MEASURE   On Initial Evaluation:  QuickDASH Outcome Score  Score: 81.82 % (9/9/2024  4:51 PM)      Interim  Post QuickDASH Outcome Score  Post Score: 50 % (10/15/2024 11:06 AM)  31.82 % improvement        Date: 9/20/24  LEFT  Digital AROM (°)  Index Finger  Middle Finger  Ring Finger  Small Finger    MP Ext/Flex  25/75 25/80 0/75 0/80   PIP Ext/Flex  0/80 0/85 0/90 0/90   DIP Ext/Flex  0/50 0/55 0/45 0/60   CALDWELL  180 195 210 230       Date: 9/27/24  LEFT  Digital AROM (°)  Index Finger  Middle Finger  Ring Finger  Small Finger    MP  Ext/Flex  5/85 5/90 0/85 0/90   PIP Ext/Flex  0/95 0/95 0/90 0/95   DIP Ext/Flex  0/65 0/65 0/60 0/75   CALDWELL  240 245 235 260     Date: 10/10/24  LEFT  Digital AROM (°)  Index Finger  Middle Finger  Ring Finger  Small Finger    MP Ext/Flex  0/85 5/90 0/85 0/90   PIP Ext/Flex  0/95 0/95 0/90 0/95   DIP Ext/Flex  0/65 0/65 0/60 0/75   CALDWELL  245 245 235 260         Wrist AROM  9/20/24: ext 40, flexion 40  9/27/24: ext 60, flexion 60  10/1/24: ext 60, flexion 70  10/10/24: ext 65, flexion 70    9/20/24 Thumb AROM: Opposition not true, achieves to [place] 10 with flexion of CMC, MP and IPJs       Strength (lbs)  Right/Left Right  9/9/24 Left   9/9/24 Right/left 10/15/24    NT NT 60, 65, 65/30, 39, 40   3 Point Pinch  NT NT 17/5   Lateral Pinch NT NT 12/7   NT= Not tested due to acuity           Date 10/3/24 10/8/24 10/10/24 10/15/24 10/17/24 10/22/24 10/24/24   /Visit # 8 9 10 11 12 13 14   # of visits authorized:           # of visits in OT POC:  16 16 16 16 16 20 updated on PN 20   Evaluation          Progress Report written  x        Manual Therapy     4 10 5   Scar massage  x x x  x x    STM x x x x  x    IASTM Suction cup scar extractor   Late entry: suction scar extractor used Scar extractor applied x Scar extractor                        Ther ex     15 15 20   Passive stretches to wrist      Into red power    Hand gripper with pegs      First rung, 2 minutes Second rung   Door dusting       x    PROM into wrist flexion with gravity assist and active ext within orthosis   opposition        Intrinsic stretches  x x        Fluidotherapy     for 10 minutes to left hand, with AROM x wrist and digits performed. Fluidotherapy for 10 minutes to left hand, with AROM x digits and wrist performed.   Fluidotherapy for 10 minutes to left hand, with AROM x digits and wrist performed. Fluidotherapy for 10 minutes to left hand, with AROM x digits and wrist performed.   Sci Fit          Thumb web space stretches x x x         strengthening    Tan putty  (Red power web)    Pinch strengthening    Tan putty  Yellow clip Red clip   Wrist PRE's     1#, 3 sets of 10 each direction 2#, 3 sets of 15 each reviewed    HEP/  Patient education topics  Joint positioning exercises Joint positioning exercises Issued soft tan putty for , roll, pinch Wrist PRE's, dorsal orthosis Thumb orthosis objective is to position thumb in static position for function. Tape removal with oil or lotion, cues to slow pace and allow for full stretches of tissue   Therapeutic Activity     8 12 15   Light prehension Gross grasp and digit isolation, coordination placing animal beads on their feet Blokus for gross grasp Chinese  Opposition and In-Hand Manipulation sorting  With orthosis on: stones       Thumb numbers Gross Grasp Sorting Marbles        flexbar     yellow Red  Red    Power web     Yellow power web Red:  15x    Bulk putty      Cook  Cook                        Self care training           Objective measurements taken and reviewed with patient x   x   x   Neuromuscular Re-education          Sensory re-ed  Bilateral coordination place/hold to thumb  (FT)                                    Orthotic fitting and training Reformed opposition splint as edema has decreased and splint is too large   Reheated and remolded orthosis Fabricated custom dorsal thumb short opponens orthosis. 10 min     Taping  Positioning to thumb in opposition Positioning to thumb in opposition    Rock tape applied in fascial lift to volar right FA   Modalities Ultrasound:  3mHz  0.8w/cm2  50%  to volar distal scar  for 8 minutes. Ultrasound:  3mHz  0.8w/cm2  50%  to volar distal scar  for 8 minutes. Ultrasound:  3mHz  0.8w/cm2  50%  to volar distal scar  for 8 minutes. Ultrasound:  3mHz  0.8w/cm2  50%  to volar distal scar  for 8 minutes. Ultrasound:  3mHz  0.8w/cm2  100%  to volar entire scar area for 8 minutes.   Ultrasound:  3mHz  0.8w/cm2  100%  to volar entire scar  area for 8 minutes. Ultrasound:  3mHz  0.8w/cm2  100%  to volar entire scar area for 8 minutes.   X= performed this date as previously outlined    HEP  On initial eval, patient education was provided on exam findings, treatment diagnosis, treatment plan, expectations, and prognosis. Patient was also provided recommendations for use of orthosis.    HEP/Education topic Date Issued Date modified Date discharged Comments    PROM, AROM within orthosis 9/9/24      Light prehension with orthosis on 9/23/24      Soft putty issued for pinch and  10/15/24                      ASSESSMENT  Patient presents with impaired 2 point discrimination in the left hand: thumb and MF at >8mm, IF 7mm, RF >8mm.  Scar tissue still appreciated.  Functional use of hand appears improving, but unclear if he is noting actual fatigue or other sensations in hand with prolonged use, so will need to continue to monitor that.    Goals: (to be met in 20 visits)  Patient will be independent and compliant with comprehensive HEP to maintain progress achieved in OT. (Progressing)  Patient will present with increase in left digit 2-5 CALDWELL to 200 to allow for ease with gripping toothbrush. (Met)  New Goal: Patient will present with full digital extension with wrist in at least 40 degrees of extension, demonstrating increase in scar/soft tissue flexibility. (Progressing)  Patient will report no more than 1/10 pain in left hand/arm during self care activities.(Progressing)  Patient will present with  strength in the left hand to that of 50% of the contralateral hand in order to be able to perform gripping of sports tools, bike handles. (Progressing)  Patient will demonstrate an improvement in the self rated DASH score to 25% or better to reflect functional gains reported, in the achievement of opening a jar, playing sports and in daily skills. (Progressing)  Patient will demonstrate individual finger movements for typing with 75% accuracy.  (Progressing)  Patient will present with sufficient ROM and strength in the left hand/arm to return to work, self care, homemaking and leisure skill independent performance. (Progressing)    Additional goals may be established as clinically indicated.        PLAN:  Frequency / Duration: Patient will be seen for 2 x/week or a total of 20 visits over a 90 day period.  Treatment will include: Manual Therapy, Neuromuscular Re-education, Self-Care Home Management, Therapeutic Activities, Therapeutic Exercise, Home Exercise Program instruction, Modalities to include: Ultrasound and hot packs, FT, and taping and custom splinting.    Next session: per protocol, continue US, FT, Body Blade, lift/carry bucket, Sci Fit, door dusting, weighted ball, tennis ball on Frisbee/ball on game tray, taping    Charges:   1 TA, 1 TE, 1 US  Total Timed Treatment: 45 minutes    Total Treatment Time: 45 minutes    JAMEL Dubois, OTR/L, CHT

## 2024-10-24 ENCOUNTER — OFFICE VISIT (OUTPATIENT)
Dept: OCCUPATIONAL MEDICINE | Age: 59
End: 2024-10-24
Attending: ORTHOPAEDIC SURGERY
Payer: COMMERCIAL

## 2024-10-24 PROCEDURE — 97035 APP MDLTY 1+ULTRASOUND EA 15: CPT

## 2024-10-24 PROCEDURE — 97530 THERAPEUTIC ACTIVITIES: CPT

## 2024-10-24 PROCEDURE — 97110 THERAPEUTIC EXERCISES: CPT

## 2024-10-24 NOTE — PROGRESS NOTES
Diagnosis:        Arm laceration, left, initial encounter (S41.112A)  %, FDS MF 90%, RF FDS 10%, %, median nerve partial, cutaneous br palm lacerated.    Referring Provider: Tyson  Date of Evaluation:    9/9/24    Precautions:   per protocol   Next MD/PA visit: 11/15/24  DOI: unknown  Date of Surgery: 8/17/24  Procedure Performed:   LEFT FOREARM WOUND EXPLORATION, REPAIR OF TENDON AND MEDIAN NERVE   Insurance Primary/Secondary: [employer] [place] P[place] / N/A     # Auth Visits: n/a              Orders:   Order Date:  9/9/2024  Authorizing Provider:   KJ ROA     Procedure:  OP REFERRAL TO [facility] [[id number]]         Order #:   [id number]  Qty:  1     Priority:  Routine                   Class:   IHP - RFL     Standing Interval:          Standing Occurrences:          Expires on:            Expected by:    Associated DX:  Arm laceration, left, initial encounter (S41.112A)     Order summary:  IHP - RFL, Routine, 8 visits  Occupational  Therapy Area of Concentration: Orthopedic  Occupational Therapy Ortho: UE  If the patient can be seen sooner with a PT vs an OT, can we cancel this order and replace with a PT order? No         Comments:  Tera Women Flexor Tendon Protocol (FCR and FDS zone 5 repair)          SUBJECTIVE:  \"The tape stayed on fr about 24 hours.\"    Pain: 2/10 \"mild\"    OBJECTIVE:        OUTCOME MEASURE   On Initial Evaluation:  QuickDASH Outcome Score  Score: 81.82 % (9/9/2024  4:51 PM)      Interim  Post QuickDASH Outcome Score  Post Score: 50 % (10/15/2024 11:06 AM)  31.82 % improvement        Date: 9/20/24  LEFT  Digital AROM (°)  Index Finger  Middle Finger  Ring Finger  Small Finger    MP Ext/Flex  25/75 25/80 0/75 0/80   PIP Ext/Flex  0/80 0/85 0/90 0/90   DIP Ext/Flex  0/50 0/55 0/45 0/60   CALDWELL  180 195 210 230       Date: 9/27/24  LEFT  Digital AROM (°)  Index Finger  Middle Finger  Ring Finger  Small Finger    MP Ext/Flex  5/85 5/90 0/85 0/90   PIP Ext/Flex  0/95  0/95 0/90 0/95   DIP Ext/Flex  0/65 0/65 0/60 0/75   CALDWELL  240 245 235 260     Date: 10/10/24  LEFT  Digital AROM (°)  Index Finger  Middle Finger  Ring Finger  Small Finger    MP Ext/Flex  0/85 5/90 0/85 0/90   PIP Ext/Flex  0/95 0/95 0/90 0/95   DIP Ext/Flex  0/65 0/65 0/60 0/75   CALDWELL  245 245 235 260         Wrist AROM  9/20/24: ext 40, flexion 40  9/27/24: ext 60, flexion 60  10/1/24: ext 60, flexion 70  10/10/24: ext 65, flexion 70    9/20/24 Thumb AROM: Opposition not true, achieves to [place] 10 with flexion of CMC, MP and IPJs       Strength (lbs)  Right/Left Right  9/9/24 Left   9/9/24 Right/left 10/15/24    NT NT 60, 65, 65/30, 39, 40   3 Point Pinch  NT NT 17/5   Lateral Pinch NT NT 12/7   NT= Not tested due to acuity           Date 10/8/24 10/10/24 10/15/24 10/17/24 10/22/24 10/24/24 10/29/24   /Visit # 9 10 11 12 13 14 15   # of visits authorized:           # of visits in OT POC:  16 16 16 16 20 updated on PN 20 20   Evaluation          Progress Report written x         Manual Therapy    4 10 5 5   Scar massage x x x  x x x    STM x x x  x     IASTM   Late entry: suction scar extractor used Scar extractor applied x Scar extractor Scar extractor                        Ther ex    15 15 20 20   Passive stretches to wrist     Into red power     Hand gripper with pegs     First rung, 2 minutes Second rung    Door dusting      x     PROM into wrist flexion with gravity assist and active ext within orthosis  opposition         Intrinsic stretches x x         Fluidotherapy    for 10 minutes to left hand, with AROM x wrist and digits performed. Fluidotherapy for 10 minutes to left hand, with AROM x digits and wrist performed.   Fluidotherapy for 10 minutes to left hand, with AROM x digits and wrist performed. Fluidotherapy for 10 minutes to left hand, with AROM x digits and wrist performed. Fluidotherapy for 10 minutes to left hand, with AROM x digits and wrist performed.   Sci Fit       2 and 2 minutes  each direction   Thumb web space stretches x x         strengthening   Tan putty  (Red power web)     Pinch strengthening   Tan putty  Yellow clip Red clip Red clip   Wrist PRE's    1#, 3 sets of 10 each direction 2#, 3 sets of 15 each reviewed reviewed    HEP/  Patient education topics Joint positioning exercises Joint positioning exercises Issued soft tan putty for , roll, pinch Wrist PRE's, dorsal orthosis Thumb orthosis objective is to position thumb in static position for function. Tape removal with oil or lotion, cues to slow pace and allow for full stretches of tissue Use of orthoses, tape removal   Therapeutic Activity    8 12 15 12   Light prehension [place] for gross grasp Chinese  Opposition and In-Hand Manipulation sorting  With orthosis on: stones       Thumb numbers Gross Grasp Sorting Marbles         flexbar    yellow Red  Red  Red    Power web    Yellow power web Red:  15x     Bulk putty     Cook  Cook  Cook    Lift carry bucket                    Self care training           Objective measurements taken and reviewed with patient   x   x    Neuromuscular Re-education          Sensory re-ed Bilateral coordination place/hold to thumb  (FT)                                     Orthotic fitting and training   Reheated and remolded orthosis Fabricated custom dorsal thumb short opponens orthosis. 10 min   Reheated and remolded dorsal thumb splint and provided complete relief off of the radial styloid; reheated and remolded night orthosis to achieve thumb in more opp than in the plain of the hand   Taping Positioning to thumb in opposition Positioning to thumb in opposition    Rock tape applied in fascial lift to volar right FA Rock tape applied in fascial lift to volar right FA   Modalities Ultrasound:  3mHz  0.8w/cm2  50%  to volar distal scar  for 8 minutes. Ultrasound:  3mHz  0.8w/cm2  50%  to volar distal scar  for 8 minutes. Ultrasound:  3mHz  0.8w/cm2  50%  to volar distal scar  for 8  minutes. Ultrasound:  3mHz  0.8w/cm2  100%  to volar entire scar area for 8 minutes.   Ultrasound:  3mHz  0.8w/cm2  100%  to volar entire scar area for 8 minutes. Ultrasound:  3mHz  0.8w/cm2  100%  to volar entire scar area for 8 minutes. Ultrasound:  3mHz  0.8w/cm2  100%  to volar entire scar area for 8 minutes.   X= performed this date as previously outlined    HEP  On initial eval, patient education was provided on exam findings, treatment diagnosis, treatment plan, expectations, and prognosis. Patient was also provided recommendations for use of orthosis.    HEP/Education topic Date Issued Date modified Date discharged Comments    PROM, AROM within orthosis 9/9/24      Light prehension with orthosis on 9/23/24      Soft putty issued for pinch and  10/15/24                      ASSESSMENT  Patient presents with more supple scar and fascial tissue in the FA.  Still with some challenges to get safe, comfortable positioning of the thumb due to muscle imbalances and, in particular, strong antagonists to the thenar muscles.    Goals: (to be met in 20 visits)  Patient will be independent and compliant with comprehensive HEP to maintain progress achieved in OT. (Progressing)  Patient will present with increase in left digit 2-5 CALDWELL to 200 to allow for ease with gripping toothbrush. (Met)  New Goal: Patient will present with full digital extension with wrist in at least 40 degrees of extension, demonstrating increase in scar/soft tissue flexibility. (Progressing)  Patient will report no more than 1/10 pain in left hand/arm during self care activities.(Progressing)  Patient will present with  strength in the left hand to that of 50% of the contralateral hand in order to be able to perform gripping of sports tools, bike handles. (Progressing)  Patient will demonstrate an improvement in the self rated DASH score to 25% or better to reflect functional gains reported, in the achievement of opening a jar, playing  sports and in daily skills. (Progressing)  Patient will demonstrate individual finger movements for typing with 75% accuracy. (Progressing)  Patient will present with sufficient ROM and strength in the left hand/arm to return to work, self care, homemaking and leisure skill independent performance. (Progressing)    Additional goals may be established as clinically indicated.        PLAN:  Frequency / Duration: Patient will be seen for 2 x/week or a total of 20 visits over a 90 day period.  Treatment will include: Manual Therapy, Neuromuscular Re-education, Self-Care Home Management, Therapeutic Activities, Therapeutic Exercise, Home Exercise Program instruction, Modalities to include: Ultrasound and hot packs, FT, and taping and custom splinting.    Next session: per protocol, continue US, FT, Body Blade, lift/carry bucket, Sci Fit, door dusting, weighted ball, tennis ball on Frisbee/ball on game tray, taping  Charges: 1 US 1 TE, 1 TA, 1 orthotic fitting and training    Total Timed Treatment: 58 minutes    Total Treatment Time: 58 minutes    JAMEL Dubois, OTR/L, CHT

## 2024-10-25 DIAGNOSIS — C44.219 BASAL CELL CARCINOMA (BCC) OF LEFT EAR: Primary | ICD-10-CM

## 2024-10-28 ENCOUNTER — LAB ENCOUNTER (OUTPATIENT)
Dept: LAB | Age: 59
End: 2024-10-28
Attending: FAMILY MEDICINE
Payer: COMMERCIAL

## 2024-10-28 ENCOUNTER — EKG ENCOUNTER (OUTPATIENT)
Dept: LAB | Age: 59
End: 2024-10-28
Attending: FAMILY MEDICINE
Payer: COMMERCIAL

## 2024-10-28 DIAGNOSIS — Z01.818 PRE-OP TESTING: ICD-10-CM

## 2024-10-28 LAB
ALBUMIN SERPL-MCNC: 4.2 G/DL (ref 3.2–4.8)
ALBUMIN/GLOB SERPL: 1.7 {RATIO} (ref 1–2)
ALP LIVER SERPL-CCNC: 80 U/L
ALT SERPL-CCNC: 45 U/L
ANION GAP SERPL CALC-SCNC: 3 MMOL/L (ref 0–18)
AST SERPL-CCNC: 27 U/L (ref ?–34)
ATRIAL RATE: 83 BPM
BASOPHILS # BLD AUTO: 0.03 X10(3) UL (ref 0–0.2)
BASOPHILS NFR BLD AUTO: 0.4 %
BILIRUB SERPL-MCNC: 0.5 MG/DL (ref 0.3–1.2)
BUN BLD-MCNC: 13 MG/DL (ref 9–23)
CALCIUM BLD-MCNC: 10.1 MG/DL (ref 8.7–10.4)
CHLORIDE SERPL-SCNC: 110 MMOL/L (ref 98–112)
CO2 SERPL-SCNC: 28 MMOL/L (ref 21–32)
CREAT BLD-MCNC: 0.87 MG/DL
EGFRCR SERPLBLD CKD-EPI 2021: 99 ML/MIN/1.73M2 (ref 60–?)
EOSINOPHIL # BLD AUTO: 0.14 X10(3) UL (ref 0–0.7)
EOSINOPHIL NFR BLD AUTO: 2.1 %
ERYTHROCYTE [DISTWIDTH] IN BLOOD BY AUTOMATED COUNT: 12.6 %
FASTING STATUS PATIENT QL REPORTED: YES
GLOBULIN PLAS-MCNC: 2.5 G/DL (ref 2–3.5)
GLUCOSE BLD-MCNC: 103 MG/DL (ref 70–99)
HCT VFR BLD AUTO: 39.6 %
HGB BLD-MCNC: 13.4 G/DL
IMM GRANULOCYTES # BLD AUTO: 0.01 X10(3) UL (ref 0–1)
IMM GRANULOCYTES NFR BLD: 0.1 %
LYMPHOCYTES # BLD AUTO: 2.27 X10(3) UL (ref 1–4)
LYMPHOCYTES NFR BLD AUTO: 33.4 %
MCH RBC QN AUTO: 30.7 PG (ref 26–34)
MCHC RBC AUTO-ENTMCNC: 33.8 G/DL (ref 31–37)
MCV RBC AUTO: 90.6 FL
MONOCYTES # BLD AUTO: 0.69 X10(3) UL (ref 0.1–1)
MONOCYTES NFR BLD AUTO: 10.1 %
NEUTROPHILS # BLD AUTO: 3.66 X10 (3) UL (ref 1.5–7.7)
NEUTROPHILS # BLD AUTO: 3.66 X10(3) UL (ref 1.5–7.7)
NEUTROPHILS NFR BLD AUTO: 53.9 %
OSMOLALITY SERPL CALC.SUM OF ELEC: 292 MOSM/KG (ref 275–295)
P AXIS: 33 DEGREES
P-R INTERVAL: 130 MS
PLATELET # BLD AUTO: 355 10(3)UL (ref 150–450)
POTASSIUM SERPL-SCNC: 4.2 MMOL/L (ref 3.5–5.1)
PROT SERPL-MCNC: 6.7 G/DL (ref 5.7–8.2)
Q-T INTERVAL: 350 MS
QRS DURATION: 100 MS
QTC CALCULATION (BEZET): 411 MS
R AXIS: 56 DEGREES
RBC # BLD AUTO: 4.37 X10(6)UL
SODIUM SERPL-SCNC: 141 MMOL/L (ref 136–145)
T AXIS: 51 DEGREES
VENTRICULAR RATE: 83 BPM
WBC # BLD AUTO: 6.8 X10(3) UL (ref 4–11)

## 2024-10-28 PROCEDURE — 93005 ELECTROCARDIOGRAM TRACING: CPT

## 2024-10-28 PROCEDURE — 80053 COMPREHEN METABOLIC PANEL: CPT | Performed by: FAMILY MEDICINE

## 2024-10-28 PROCEDURE — 85025 COMPLETE CBC W/AUTO DIFF WBC: CPT | Performed by: FAMILY MEDICINE

## 2024-10-28 PROCEDURE — 93010 ELECTROCARDIOGRAM REPORT: CPT | Performed by: INTERNAL MEDICINE

## 2024-10-29 ENCOUNTER — OFFICE VISIT (OUTPATIENT)
Dept: OCCUPATIONAL MEDICINE | Age: 59
End: 2024-10-29
Attending: ORTHOPAEDIC SURGERY
Payer: COMMERCIAL

## 2024-10-29 PROCEDURE — 97530 THERAPEUTIC ACTIVITIES: CPT

## 2024-10-29 PROCEDURE — 97760 ORTHOTIC MGMT&TRAING 1ST ENC: CPT

## 2024-10-29 PROCEDURE — 97035 APP MDLTY 1+ULTRASOUND EA 15: CPT

## 2024-10-29 PROCEDURE — 97110 THERAPEUTIC EXERCISES: CPT

## 2024-10-29 NOTE — PROGRESS NOTES
Diagnosis:        Arm laceration, left, initial encounter (S41.112A)  %, FDS MF 90%, RF FDS 10%, %, median nerve partial, cutaneous br palm lacerated.    Referring Provider: Tyson  Date of Evaluation:    9/9/24    Precautions:   per protocol   Next MD/PA visit: 11/15/24  DOI: unknown  Date of Surgery: 8/17/24  Procedure Performed:   LEFT FOREARM WOUND EXPLORATION, REPAIR OF TENDON AND MEDIAN NERVE   Insurance Primary/Secondary: [employer] [place] PPO / N/A     # Auth Visits: n/a              Orders:   Order Date:  9/9/2024  Authorizing Provider:   KJ ROA     Procedure:  OP REFERRAL TO [facility] [[id number]]         Order #:   [id number]  Qty:  1     Priority:  Routine                   Class:   IHP - RFL     Standing Interval:          Standing Occurrences:          Expires on:            Expected by:    Associated DX:  Arm laceration, left, initial encounter (S41.112A)     Order summary:  IHP - RFL, Routine, 8 visits  Occupational  Therapy Area of Concentration: Orthopedic  Occupational Therapy Ortho: UE  If the patient can be seen sooner with a PT vs an OT, can we cancel this order and replace with a PT order? No         Comments:  Tera Women Flexor Tendon Protocol (FCR and FDS zone 5 repair)          SUBJECTIVE:  Reports took tape off this morning.    Pain: 2/10 \"picking up something heavy, like a gallon of milk.\"    OBJECTIVE:        OUTCOME MEASURE   On Initial Evaluation:  QuickDASH Outcome Score  Score: 81.82 % (9/9/2024  4:51 PM)      Interim  Post QuickDASH Outcome Score  Post Score: 50 % (10/15/2024 11:06 AM)  31.82 % improvement        Date: 9/20/24  LEFT  Digital AROM (°)  Index Finger  Middle Finger  Ring Finger  Small Finger    MP Ext/Flex  25/75 25/80 0/75 0/80   PIP Ext/Flex  0/80 0/85 0/90 0/90   DIP Ext/Flex  0/50 0/55 0/45 0/60   CALDWELL  180 195 210 230       Date: 9/27/24  LEFT  Digital AROM (°)  Index Finger  Middle Finger  Ring Finger  Small Finger    MP Ext/Flex   5/85 5/90 0/85 0/90   PIP Ext/Flex  0/95 0/95 0/90 0/95   DIP Ext/Flex  0/65 0/65 0/60 0/75   CALDWELL  240 245 235 260     Date: 10/10/24  LEFT  Digital AROM (°)  Index Finger  Middle Finger  Ring Finger  Small Finger    MP Ext/Flex  0/85 5/90 0/85 0/90   PIP Ext/Flex  0/95 0/95 0/90 0/95   DIP Ext/Flex  0/65 0/65 0/60 0/75   CALDWELL  245 245 235 260         Wrist AROM  9/20/24: ext 40, flexion 40  9/27/24: ext 60, flexion 60  10/1/24: ext 60, flexion 70  10/10/24: ext 65, flexion 70  10/31/24: ext: 60, flexion 70 (right: ext/flex: 65/60)    9/20/24 Thumb AROM: Opposition not true, achieves to [place] 10 with flexion of CMC, MP and IPJs       Strength (lbs)  Right/Left Right  9/9/24 Left   9/9/24 Right/left 10/15/24    NT NT 60, 65, 65/30, 39, 40   3 Point Pinch  NT NT 17/5   Lateral Pinch NT NT 12/7   NT= Not tested due to acuity           Date 10/10/24 10/15/24 10/17/24 10/22/24 10/24/24 10/29/24 10/31/24   /Visit # 10 11 12 13 14 15 16   # of visits authorized:           # of visits in OT POC:  16 16 16 20 updated on PN 20 20 20   Evaluation          Progress Report written          Manual Therapy   4 10 5 5 5   Scar massage x x  x x x x    STM x x  x      IASTM  Late entry: suction scar extractor used Scar extractor applied x Scar extractor Scar extractor Scar extractor                        Ther ex   15 15 20 20 25   Passive stretches to wrist    Into red power   Green power web   Hand gripper with pegs    First rung, 2 minutes Second rung  Second rung   Door dusting     x  x    PROM into wrist flexion with gravity assist and active ext within orthosis opposition          Intrinsic stretches x          Fluidotherapy   for 10 minutes to left hand, with AROM x wrist and digits performed. Fluidotherapy for 10 minutes to left hand, with AROM x digits and wrist performed.   Fluidotherapy for 10 minutes to left hand, with AROM x digits and wrist performed. Fluidotherapy for 10 minutes to left hand, with AROM x  digits and wrist performed. Fluidotherapy for 10 minutes to left hand, with AROM x digits and wrist performed. Fluidotherapy for 10 minutes to left hand, with AROM x digits and wrist performed.   Sci Fit      2 and 2 minutes each direction 3 and 3   Thumb web space stretches x          strengthening  Tan putty  (Red power web)      Pinch strengthening  Tan putty  Yellow clip Red clip Red clip Green clip   Wrist PRE's   1#, 3 sets of 10 each direction 2#, 3 sets of 15 each reviewed reviewed     HEP/  Patient education topics Joint positioning exercises Issued soft tan putty for , roll, pinch Wrist PRE's, dorsal orthosis Thumb orthosis objective is to position thumb in static position for function. Tape removal with oil or lotion, cues to slow pace and allow for full stretches of tissue Use of orthoses, tape removal Door dusting   Therapeutic Activity   8 12 15 12 22   Light prehension Chinese  Opposition and In-Hand Manipulation sorting  With orthosis on: stones        Gross Grasp Sorting Marbles          flexbar   yellow Red  Red  Red  green   Power web   Yellow power web Red:  15x   green   Bulk putty    Tan  Cook  Tan  Red   Lift carry bucket          Body blade       1 minutes all directions   Self care training           Objective measurements taken and reviewed with patient  x   x  x   Neuromuscular Re-education          Sensory re-ed  (FT)                                      Orthotic fitting and training  Reheated and remolded orthosis Fabricated custom dorsal thumb short opponens orthosis. 10 min   Reheated and remolded dorsal thumb splint and provided complete relief off of the radial styloid; reheated and remolded night orthosis to achieve thumb in more opp than in the plain of the hand    Taping Positioning to thumb in opposition    Rock tape applied in fascial lift to volar right FA Rock tape applied in fascial lift to volar right FA Deferred due to patient having just removed tape this  morning   Modalities Ultrasound:  3mHz  0.8w/cm2  50%  to volar distal scar  for 8 minutes. Ultrasound:  3mHz  0.8w/cm2  50%  to volar distal scar  for 8 minutes. Ultrasound:  3mHz  0.8w/cm2  100%  to volar entire scar area for 8 minutes.   Ultrasound:  3mHz  0.8w/cm2  100%  to volar entire scar area for 8 minutes. Ultrasound:  3mHz  0.8w/cm2  100%  to volar entire scar area for 8 minutes. Ultrasound:  3mHz  0.8w/cm2  100%  to volar entire scar area for 8 minutes. Ultrasound:  3mHz  0.8w/cm2  100%  to volar entire scar area for 8 minutes.   X= performed this date as previously outlined    HEP  On initial eval, patient education was provided on exam findings, treatment diagnosis, treatment plan, expectations, and prognosis. Patient was also provided recommendations for use of orthosis.    HEP/Education topic Date Issued Date modified Date discharged Comments    PROM, AROM within orthosis 9/9/24      Light prehension with orthosis on 9/23/24      Soft putty issued for pinch and  10/15/24                      ASSESSMENT  Patient presents with ROM in the wrist WFL's.  Some mild tightness limits gross combined movements, but is easily accommodated for with change in posture or position of the limb.  Likely discomfort when lifting gallon of milk is due to pulling on tissues without strong grasp.    Goals: (to be met in 20 visits)  Patient will be independent and compliant with comprehensive HEP to maintain progress achieved in OT. (Progressing)  Patient will present with increase in left digit 2-5 CALDWELL to 200 to allow for ease with gripping toothbrush. (Met)  New Goal: Patient will present with full digital extension with wrist in at least 40 degrees of extension, demonstrating increase in scar/soft tissue flexibility. (Progressing)  Patient will report no more than 1/10 pain in left hand/arm during self care activities.(Progressing)  Patient will present with  strength in the left hand to that of 50% of the  contralateral hand in order to be able to perform gripping of sports tools, bike handles. (Progressing)  Patient will demonstrate an improvement in the self rated DASH score to 25% or better to reflect functional gains reported, in the achievement of opening a jar, playing sports and in daily skills. (Progressing)  Patient will demonstrate individual finger movements for typing with 75% accuracy. (Progressing)  Patient will present with sufficient ROM and strength in the left hand/arm to return to work, self care, homemaking and leisure skill independent performance. (Progressing)    Additional goals may be established as clinically indicated.        PLAN:  Frequency / Duration: Patient will be seen for 2 x/week or a total of 20 visits over a 90 day period.  Treatment will include: Manual Therapy, Neuromuscular Re-education, Self-Care Home Management, Therapeutic Activities, Therapeutic Exercise, Home Exercise Program instruction, Modalities to include: Ultrasound and hot packs, FT, and taping and custom splinting.    Next session: per protocol, continue US, FT, Body Blade, lift/carry bucket, Sci Fit, door dusting, weighted ball;   lift, carry bucket; taping prn    Charges: 1 US 2 TE, 1 TA,     Total Timed Treatment: 60 minutes    Total Treatment Time: 60 minutes    JAMEL Dubois, OTR/L, CHT

## 2024-10-30 ENCOUNTER — OFFICE VISIT (OUTPATIENT)
Dept: FAMILY MEDICINE CLINIC | Facility: CLINIC | Age: 59
End: 2024-10-30
Payer: COMMERCIAL

## 2024-10-30 VITALS
SYSTOLIC BLOOD PRESSURE: 116 MMHG | TEMPERATURE: 97 F | DIASTOLIC BLOOD PRESSURE: 72 MMHG | WEIGHT: 162 LBS | RESPIRATION RATE: 16 BRPM | HEART RATE: 90 BPM | BODY MASS INDEX: 22.68 KG/M2 | HEIGHT: 71 IN

## 2024-10-30 DIAGNOSIS — M21.372 LEFT FOOT DROP: Primary | ICD-10-CM

## 2024-10-30 DIAGNOSIS — Z01.818 PREOPERATIVE CLEARANCE: Primary | ICD-10-CM

## 2024-10-30 DIAGNOSIS — C44.219 BASAL CELL CARCINOMA (BCC) OF LEFT EAR: ICD-10-CM

## 2024-10-30 PROCEDURE — 3074F SYST BP LT 130 MM HG: CPT | Performed by: FAMILY MEDICINE

## 2024-10-30 PROCEDURE — 3008F BODY MASS INDEX DOCD: CPT | Performed by: FAMILY MEDICINE

## 2024-10-30 PROCEDURE — 3078F DIAST BP <80 MM HG: CPT | Performed by: FAMILY MEDICINE

## 2024-10-30 PROCEDURE — 99243 OFF/OP CNSLTJ NEW/EST LOW 30: CPT | Performed by: FAMILY MEDICINE

## 2024-10-30 NOTE — H&P (VIEW-ONLY)
[facility] Office Note  Chief Complaint:   Chief Complaint   Patient presents with    Pre-Op Exam     Surgery on 11/19/24 with Dr. Anne at [facility] for left ear reconstruction.     Nerves     Nerve \"numbness\" in feet, lower leg, and neck area.        HPI:   This is a 59 year old male coming in for left ear reconstruction with local tissue rearrangement, possible Integra, possible grafts with Dr. Adele Anne at [facility] on November 19, 2024.  Patient denies any prior complications related to surgery or anesthesia.  He is not taking any blood thinning medications.  Blood pressure is well-controlled.  He has no prior history of obstructive sleep apnea.  He is a non-smoker.  Denies any recent fever or cough.  He is not actively having any chest pain or shortness of breath.    Of side note, patient has been experiencing worsening left foot drop over the last few weeks.  He is being followed by physiatry and will be scheduled for an EMG on November 25.  Patient provided information for EMG appointment.      Past Medical History:    Anxiety    Back pain    Cancer (HCC)    BASAL CELL CA LEFT EAR    Depression    Hematuria    Nerve damage     Past Surgical History:   Procedure Laterality Date    Arthroscopy of joint unlisted      left knee scope    Colonoscopy N/A 01/29/2016    Procedure: COLONOSCOPY;  Surgeon: Belem Tobias MD;  Location:  ENDOSCOPY    Other surgical history  08/2024    colectomy    Other surgical history Left 08/2024    wrist     Social History:  Social History     Socioeconomic History    Marital status: Single   Tobacco Use    Smoking status: Never    Smokeless tobacco: Never   Vaping Use    Vaping status: Never Used   Substance and Sexual Activity    Alcohol use: Not Currently     Comment: \"a few\"    Drug use: Never   Social History Narrative    ** Merged History Encounter **          Social Drivers of Health     Financial Resource Strain: Low Risk  (8/22/2024)     Financial Resource Strain     Med Affordability: No   Food Insecurity: No Food Insecurity (8/22/2024)    Food Insecurity     Food Insecurity: Never true   Transportation Needs: No Transportation Needs (8/22/2024)    Transportation Needs     Lack of Transportation: No   Housing Stability: Low Risk  (8/22/2024)    Housing Stability     Housing Instability: No     Family History:  Family History   Problem Relation Age of Onset    Lipids Father     Hypertension Father     Cancer Father     Other (HTN) Father     Suicide History Mother     Depression Mother     Other (Other) Brother      Allergies:  Allergies[1]  Current Meds:  Current Outpatient Medications   Medication Sig Dispense Refill    [START ON 11/4/2024] LORazepam 2 MG Oral Tab Take 1 tablet (2 mg total) by mouth nightly. 30 tablet 0    [START ON 11/4/2024] LORazepam 0.5 MG Oral Tab Take 1 tablet (0.5 mg total) by mouth nightly as needed for Anxiety. 30 tablet 0    [START ON 11/4/2024] sertraline (ZOLOFT) 100 MG Oral Tab Take 1.5 tablets (150 mg total) by mouth every morning. 45 tablet 0    [START ON 11/4/2024] QUEtiapine 200 MG Oral Tab Take 1 tablet (200 mg total) by mouth nightly. 30 tablet 0    QUEtiapine 200 MG Oral Tab Take 1 tablet (200 mg total) by mouth nightly. 30 tablet 0    Multiple Vitamin (MULTI VITAMIN MENS) Oral Tab Take 1 tablet by mouth daily.        Counseling given: Not Answered       REVIEW OF SYSTEMS:   ROS:  CONSTITUTIONAL:  Denies any unusual weight gain/loss, fever, chills, weakness or fatigue.  HEENT:  Eyes:  Denies visual loss, blurred vision, double vision or yellow sclerae. Ears, Nose, Throat:  Denies hearing loss, sneezing, congestion, runny nose or sore throat.  CARDIOVASCULAR:  Denies chest pain, chest pressure or chest discomfort. No palpitations or edema.  Denies any dyspnea on exertion or at rest  RESPIRATORY:  Denies shortness of breath, cough  GASTROINTESTINAL:  Denies any abdominal pain, nausea, vomiting,  diarrhea  NEUROLOGICAL:  Denies headache, dizziness, syncope, numbness or tingling in the extremities.  MUSCULOSKELETAL:  Denies muscle, back pain, joint pain or stiffness.  HEMATOLOGIC:  Denies anemia    EXAM:   /72   Pulse 90   Temp 97.2 °F (36.2 °C) (Temporal)   Resp 16   Ht 5' 11\" (1.803 m)   Wt 162 lb (73.5 kg)   BMI 22.59 kg/m²  Estimated body mass index is 22.59 kg/m² as calculated from the following:    Height as of this encounter: 5' 11\" (1.803 m).    Weight as of this encounter: 162 lb (73.5 kg).   Vital signs reviewed.Appears stated age, well groomed.  Physical Exam:  GEN:  Patient is alert and oriented x3, no apparent distress  HEAD:  Normocephalic, atraumatic  HEENT:  Eyes: EOMI, PERRLA, no scleral icterus, conjunctivae clear bilaterally.  Ears: TM's clear and visible bilaterally, no excess cerumen or erythema.  Throat:  No tonsillar erythema or exudate.  Mouth:  No oral lesions or ulcerations, no dental abnormalities noted.  NECK:  Supple, no LAD  LUNGS: clear to auscultation bilaterally, no rales/rhonchi/wheezing  HEART:  Regular rate and rhythm, no murmurs, rubs or gallops  ABDOMEN:  Soft, nondistended, nontender, bowel sounds normal in all 4 quadrants, no hepatosplenomegaly  EXTREMITIES:  Strength intact with 5/5 bilaterally upper and lower extremities, no edema noted  NEURO:  CN 2 - 12 grossly intact     ASSESSMENT AND PLAN:   1. Preoperative clearance  -  based on stable labs, EKG and physical exam, patient is medically optimized for   -  labs stable  -  EKG: NSR with no ST changes or QT prolongation    2. Basal cell carcinoma (BCC) of left ear  -  patient to undergo above procedure  -  further recs per dermatology and plastic surgery      Meds & Refills for this Visit:  Requested Prescriptions      No prescriptions requested or ordered in this encounter       Health Maintenance:  Health Maintenance Due   Topic Date Due    Annual Physical  Never done    Zoster Vaccines (1 of 2) Never  done    PSA  12/17/2017    COVID-19 Vaccine (3 - 2023-24 season) 09/01/2024    Influenza Vaccine (1) Never done       Patient/Caregiver Education: Patient/Caregiver Education: There are no barriers to learning. Medical education done.   Outcome: Patient verbalizes understanding. Patient is notified to call with any questions, complications, allergies, or worsening or changing symptoms.  Patient is to call with any side effects or complications from the treatments as a result of today.     Problem List:  Patient Active Problem List   Diagnosis    Penetrating abdominal trauma, initial encounter    Arm laceration, left, initial encounter    Major depressive disorder, recurrent severe without psychotic features (HCC)    MICK (generalized anxiety disorder)    Insomnia    MDD (major depressive disorder)    Basal cell carcinoma (BCC) of left ear       CHELSEA MACIAS, DO    Please note that portions of this note may have been completed with a voice recognition program. Efforts were made to edit the dictations but occasionally words are mis-transcribed.         [1] No Known Allergies

## 2024-10-30 NOTE — PROGRESS NOTES
[facility] Office Note  Chief Complaint:   Chief Complaint   Patient presents with    Pre-Op Exam     Surgery on 11/19/24 with Dr. Anne at [facility] for left ear reconstruction.     Nerves     Nerve \"numbness\" in feet, lower leg, and neck area.        HPI:   This is a 59 year old male coming in for left ear reconstruction with local tissue rearrangement, possible Integra, possible grafts with Dr. Adele Anne at [facility] on November 19, 2024.  Patient denies any prior complications related to surgery or anesthesia.  He is not taking any blood thinning medications.  Blood pressure is well-controlled.  He has no prior history of obstructive sleep apnea.  He is a non-smoker.  Denies any recent fever or cough.  He is not actively having any chest pain or shortness of breath.    Of side note, patient has been experiencing worsening left foot drop over the last few weeks.  He is being followed by physiatry and will be scheduled for an EMG on November 25.  Patient provided information for EMG appointment.      Past Medical History:    Anxiety    Back pain    Cancer (HCC)    BASAL CELL CA LEFT EAR    Depression    Hematuria    Nerve damage     Past Surgical History:   Procedure Laterality Date    Arthroscopy of joint unlisted      left knee scope    Colonoscopy N/A 01/29/2016    Procedure: COLONOSCOPY;  Surgeon: Belem Tobias MD;  Location:  ENDOSCOPY    Other surgical history  08/2024    colectomy    Other surgical history Left 08/2024    wrist     Social History:  Social History     Socioeconomic History    Marital status: Single   Tobacco Use    Smoking status: Never    Smokeless tobacco: Never   Vaping Use    Vaping status: Never Used   Substance and Sexual Activity    Alcohol use: Not Currently     Comment: \"a few\"    Drug use: Never   Social History Narrative    ** Merged History Encounter **          Social Drivers of Health     Financial Resource Strain: Low Risk  (8/22/2024)     Financial Resource Strain     Med Affordability: No   Food Insecurity: No Food Insecurity (8/22/2024)    Food Insecurity     Food Insecurity: Never true   Transportation Needs: No Transportation Needs (8/22/2024)    Transportation Needs     Lack of Transportation: No   Housing Stability: Low Risk  (8/22/2024)    Housing Stability     Housing Instability: No     Family History:  Family History   Problem Relation Age of Onset    Lipids Father     Hypertension Father     Cancer Father     Other (HTN) Father     Suicide History Mother     Depression Mother     Other (Other) Brother      Allergies:  Allergies[1]  Current Meds:  Current Outpatient Medications   Medication Sig Dispense Refill    [START ON 11/4/2024] LORazepam 2 MG Oral Tab Take 1 tablet (2 mg total) by mouth nightly. 30 tablet 0    [START ON 11/4/2024] LORazepam 0.5 MG Oral Tab Take 1 tablet (0.5 mg total) by mouth nightly as needed for Anxiety. 30 tablet 0    [START ON 11/4/2024] sertraline (ZOLOFT) 100 MG Oral Tab Take 1.5 tablets (150 mg total) by mouth every morning. 45 tablet 0    [START ON 11/4/2024] QUEtiapine 200 MG Oral Tab Take 1 tablet (200 mg total) by mouth nightly. 30 tablet 0    QUEtiapine 200 MG Oral Tab Take 1 tablet (200 mg total) by mouth nightly. 30 tablet 0    Multiple Vitamin (MULTI VITAMIN MENS) Oral Tab Take 1 tablet by mouth daily.        Counseling given: Not Answered       REVIEW OF SYSTEMS:   ROS:  CONSTITUTIONAL:  Denies any unusual weight gain/loss, fever, chills, weakness or fatigue.  HEENT:  Eyes:  Denies visual loss, blurred vision, double vision or yellow sclerae. Ears, Nose, Throat:  Denies hearing loss, sneezing, congestion, runny nose or sore throat.  CARDIOVASCULAR:  Denies chest pain, chest pressure or chest discomfort. No palpitations or edema.  Denies any dyspnea on exertion or at rest  RESPIRATORY:  Denies shortness of breath, cough  GASTROINTESTINAL:  Denies any abdominal pain, nausea, vomiting,  diarrhea  NEUROLOGICAL:  Denies headache, dizziness, syncope, numbness or tingling in the extremities.  MUSCULOSKELETAL:  Denies muscle, back pain, joint pain or stiffness.  HEMATOLOGIC:  Denies anemia    EXAM:   /72   Pulse 90   Temp 97.2 °F (36.2 °C) (Temporal)   Resp 16   Ht 5' 11\" (1.803 m)   Wt 162 lb (73.5 kg)   BMI 22.59 kg/m²  Estimated body mass index is 22.59 kg/m² as calculated from the following:    Height as of this encounter: 5' 11\" (1.803 m).    Weight as of this encounter: 162 lb (73.5 kg).   Vital signs reviewed.Appears stated age, well groomed.  Physical Exam:  GEN:  Patient is alert and oriented x3, no apparent distress  HEAD:  Normocephalic, atraumatic  HEENT:  Eyes: EOMI, PERRLA, no scleral icterus, conjunctivae clear bilaterally.  Ears: TM's clear and visible bilaterally, no excess cerumen or erythema.  Throat:  No tonsillar erythema or exudate.  Mouth:  No oral lesions or ulcerations, no dental abnormalities noted.  NECK:  Supple, no LAD  LUNGS: clear to auscultation bilaterally, no rales/rhonchi/wheezing  HEART:  Regular rate and rhythm, no murmurs, rubs or gallops  ABDOMEN:  Soft, nondistended, nontender, bowel sounds normal in all 4 quadrants, no hepatosplenomegaly  EXTREMITIES:  Strength intact with 5/5 bilaterally upper and lower extremities, no edema noted  NEURO:  CN 2 - 12 grossly intact     ASSESSMENT AND PLAN:   1. Preoperative clearance  -  based on stable labs, EKG and physical exam, patient is medically optimized for   -  labs stable  -  EKG: NSR with no ST changes or QT prolongation    2. Basal cell carcinoma (BCC) of left ear  -  patient to undergo above procedure  -  further recs per dermatology and plastic surgery      Meds & Refills for this Visit:  Requested Prescriptions      No prescriptions requested or ordered in this encounter       Health Maintenance:  Health Maintenance Due   Topic Date Due    Annual Physical  Never done    Zoster Vaccines (1 of 2) Never  done    PSA  12/17/2017    COVID-19 Vaccine (3 - 2023-24 season) 09/01/2024    Influenza Vaccine (1) Never done       Patient/Caregiver Education: Patient/Caregiver Education: There are no barriers to learning. Medical education done.   Outcome: Patient verbalizes understanding. Patient is notified to call with any questions, complications, allergies, or worsening or changing symptoms.  Patient is to call with any side effects or complications from the treatments as a result of today.     Problem List:  Patient Active Problem List   Diagnosis    Penetrating abdominal trauma, initial encounter    Arm laceration, left, initial encounter    Major depressive disorder, recurrent severe without psychotic features (HCC)    MICK (generalized anxiety disorder)    Insomnia    MDD (major depressive disorder)    Basal cell carcinoma (BCC) of left ear       CHELSEA MACIAS, DO    Please note that portions of this note may have been completed with a voice recognition program. Efforts were made to edit the dictations but occasionally words are mis-transcribed.         [1] No Known Allergies

## 2024-10-31 ENCOUNTER — OFFICE VISIT (OUTPATIENT)
Dept: OCCUPATIONAL MEDICINE | Age: 59
End: 2024-10-31
Attending: ORTHOPAEDIC SURGERY
Payer: COMMERCIAL

## 2024-10-31 PROCEDURE — 97530 THERAPEUTIC ACTIVITIES: CPT

## 2024-10-31 PROCEDURE — 97110 THERAPEUTIC EXERCISES: CPT

## 2024-10-31 PROCEDURE — 97035 APP MDLTY 1+ULTRASOUND EA 15: CPT

## 2024-10-31 NOTE — PROGRESS NOTES
Diagnosis:        Arm laceration, left, initial encounter (S41.112A)  %, FDS MF 90%, RF FDS 10%, %, median nerve partial, cutaneous br palm lacerated.    Referring Provider: Tyson  Date of Evaluation:    9/9/24    Precautions:   per protocol   Next MD/PA visit: 11/15/24  DOI: unknown  Date of Surgery: 8/17/24  Procedure Performed:   LEFT FOREARM WOUND EXPLORATION, REPAIR OF TENDON AND MEDIAN NERVE   Insurance Primary/Secondary: [employer] / N/A     # Auth Visits: n/a              Orders:   Order Date:  9/9/2024  Authorizing Provider:   KJ ROA     Procedure:  OP REFERRAL TO [facility] [[id number]]         Order #:   [id number]  Qty:  1     Priority:  Routine                   Class:   IHP - RFL     Standing Interval:          Standing Occurrences:          Expires on:            Expected by:    Associated DX:  Arm laceration, left, initial encounter (S41.112A)     Order summary:  IHP - RFL, Routine, 8 visits  Occupational  Therapy Area of Concentration: Orthopedic  Occupational Therapy Ortho: UE  If the patient can be seen sooner with a PT vs an OT, can we cancel this order and replace with a PT order? No         Comments:  [facility] Flexor Tendon Protocol (FCR and FDS zone 5 repair)          SUBJECTIVE:  Reports no change in numbness in thumb.    Pain: 1/10 with heavy lifting    OBJECTIVE:        OUTCOME MEASURE   On Initial Evaluation:  QuickDASH Outcome Score  Score: 81.82 % (9/9/2024  4:51 PM)      Interim  Post QuickDASH Outcome Score  Post Score: 50 % (10/15/2024 11:06 AM)  31.82 % improvement        Date: 9/20/24  LEFT  Digital AROM (°)  Index Finger  Middle Finger  Ring Finger  Small Finger    MP Ext/Flex  25/75 25/80 0/75 0/80   PIP Ext/Flex  0/80 0/85 0/90 0/90   DIP Ext/Flex  0/50 0/55 0/45 0/60   CALDWELL  180 195 210 230       Date: 9/27/24  LEFT  Digital AROM (°)  Index Finger  Middle Finger  Ring Finger  Small Finger    MP Ext/Flex  5/85 5/90 0/85 0/90   PIP  Ext/Flex  0/95 0/95 0/90 0/95   DIP Ext/Flex  0/65 0/65 0/60 0/75   CALDWELL  240 245 235 260     Date: 10/10/24  LEFT  Digital AROM (°)  Index Finger  Middle Finger  Ring Finger  Small Finger    MP Ext/Flex  0/85 5/90 0/85 0/90   PIP Ext/Flex  0/95 0/95 0/90 0/95   DIP Ext/Flex  0/65 0/65 0/60 0/75   CALDWELL  245 245 235 260         Wrist AROM  9/20/24: ext 40, flexion 40  9/27/24: ext 60, flexion 60  10/1/24: ext 60, flexion 70  10/10/24: ext 65, flexion 70  10/31/24: ext: 60, flexion 70 (right: ext/flex: 65/60)    9/20/24 Thumb AROM: Opposition not true, achieves to [place] 10 with flexion of CMC, MP and IPJs       Strength (lbs)  Right/Left Right  9/9/24 Left   9/9/24 Right/left 10/15/24    NT NT 60, 65, 65/30, 39, 40   3 Point Pinch  NT NT 17/5   Lateral Pinch NT NT 12/7   NT= Not tested due to acuity           Date 10/15/24 10/17/24 10/22/24 10/24/24 10/29/24 10/31/24 11/5/24   /Visit # 11 12 13 14 15 16 17   # of visits authorized:           # of visits in OT POC:  16 16 20 updated on PN 20 20 20 20   Evaluation          Progress Report written          Manual Therapy  4 10 5 5 5 5   Scar massage x  x x x x x    STM x  x       IASTM Late entry: suction scar extractor used Scar extractor applied x Scar extractor Scar extractor Scar extractor Scar extractor                        Ther ex  15 15 20 20 25 12   Passive stretches to wrist   Into red power   Green power web Blue power web   Hand gripper with pegs   First rung, 2 minutes Second rung  Second rung Second rung   Door dusting    x  x x    PROM into wrist flexion with gravity assist and active ext within orthosis           Intrinsic stretches           Fluidotherapy  for 10 minutes to left hand, with AROM x wrist and digits performed. Fluidotherapy for 10 minutes to left hand, with AROM x digits and wrist performed.   Fluidotherapy for 10 minutes to left hand, with AROM x digits and wrist performed. Fluidotherapy for 10 minutes to left hand, with AROM x  digits and wrist performed. Fluidotherapy for 10 minutes to left hand, with AROM x digits and wrist performed. Fluidotherapy for 10 minutes to left hand, with AROM x digits and wrist performed. Fluidotherapy for 10 minutes to left hand, with AROM x digits and wrist performed.   Sci Fit     2 and 2 minutes each direction 3 and 3    Thumb web space stretches           strengthening Tan putty  (Red power web)       Pinch strengthening Tan putty  Yellow clip Red clip Red clip Green clip Green clip   Wrist PRE's  1#, 3 sets of 10 each direction 2#, 3 sets of 15 each reviewed reviewed      HEP/  Patient education topics Issued soft tan putty for , roll, pinch Wrist PRE's, dorsal orthosis Thumb orthosis objective is to position thumb in static position for function. Tape removal with oil or lotion, cues to slow pace and allow for full stretches of tissue Use of orthoses, tape removal Door dusting OT POC   Therapeutic Activity  8 12 15 12 22 20   Light prehension  With orthosis on: stones                  flexbar  yellow Red  Red  Red  green green   Power web  Yellow power web Red:  15x   green blue   Bulk putty   Tan  Tan  Tan  Red Red    Lift carry bucket       10#   Overhead reaching       5# up to cabinet, 10x   Push/pull sled          Body blade      1 minutes all directions Long, 1 minute all direction   Self care training           Objective measurements taken and reviewed with patient x   x  x    Neuromuscular Re-education          Sensory re-ed (FT)                                       Orthotic fitting and training Reheated and remolded orthosis Fabricated custom dorsal thumb short opponens orthosis. 10 min   Reheated and remolded dorsal thumb splint and provided complete relief off of the radial styloid; reheated and remolded night orthosis to achieve thumb in more opp than in the plain of the hand     Taping    Rock tape applied in fascial lift to volar right FA Rock tape applied in fascial lift to  volar right FA Deferred due to patient having just removed tape this morning Rock tape applied in fascial lift to volar right FA   Modalities Ultrasound:  3mHz  0.8w/cm2  50%  to volar distal scar  for 8 minutes. Ultrasound:  3mHz  0.8w/cm2  100%  to volar entire scar area for 8 minutes.   Ultrasound:  3mHz  0.8w/cm2  100%  to volar entire scar area for 8 minutes. Ultrasound:  3mHz  0.8w/cm2  100%  to volar entire scar area for 8 minutes. Ultrasound:  3mHz  0.8w/cm2  100%  to volar entire scar area for 8 minutes. Ultrasound:  3mHz  0.8w/cm2  100%  to volar entire scar area for 8 minutes. Ultrasound:  3mHz  0.8w/cm2  100%  to volar entire scar area for 8 minutes.   X= performed this date as previously outlined    HEP  On initial eval, patient education was provided on exam findings, treatment diagnosis, treatment plan, expectations, and prognosis. Patient was also provided recommendations for use of orthosis.    HEP/Education topic Date Issued Date modified Date discharged Comments    PROM, AROM within orthosis 9/9/24      Light prehension with orthosis on 9/23/24      Soft putty issued for pinch and  10/15/24                      ASSESSMENT  Patient presents with improving functional use of hand and arm. Thumb weakness accounted for with short opponens orthosis, which he will likely continue with as nerve recovery progresses.     Goals: (to be met in 20 visits)  Patient will be independent and compliant with comprehensive HEP to maintain progress achieved in OT. (Progressing)  Patient will present with increase in left digit 2-5 CALDWELL to 200 to allow for ease with gripping toothbrush. (Met)  New Goal: Patient will present with full digital extension with wrist in at least 40 degrees of extension, demonstrating increase in scar/soft tissue flexibility. (Progressing)  Patient will report no more than 1/10 pain in left hand/arm during self care activities.(Progressing)  Patient will present with  strength in the  left hand to that of 50% of the contralateral hand in order to be able to perform gripping of sports tools, bike handles. (Progressing)  Patient will demonstrate an improvement in the self rated DASH score to 25% or better to reflect functional gains reported, in the achievement of opening a jar, playing sports and in daily skills. (Progressing)  Patient will demonstrate individual finger movements for typing with 75% accuracy. (Progressing)  Patient will present with sufficient ROM and strength in the left hand/arm to return to work, self care, homemaking and leisure skill independent performance. (Progressing)    Additional goals may be established as clinically indicated.        PLAN:  Frequency / Duration: Patient will be seen for 2 x/week or a total of 20 visits over a 90 day period.  Treatment will include: Manual Therapy, Neuromuscular Re-education, Self-Care Home Management, Therapeutic Activities, Therapeutic Exercise, Home Exercise Program instruction, Modalities to include: Ultrasound and hot packs, FT, and taping and custom splinting.    Next session: per protocol, continue US, FT, Body Blade, push/pull sled;  Sci Fit, door dusting, weighted ball;  lift, carry crate; taping prn    Charges: 1 US 1 TE, 1 TA,     Total Timed Treatment: 45 minutes    Total Treatment Time: 45 minutes    JAMEL Dubois, OTR/L, CHT

## 2024-11-05 ENCOUNTER — OFFICE VISIT (OUTPATIENT)
Dept: OCCUPATIONAL MEDICINE | Age: 59
End: 2024-11-05
Attending: ORTHOPAEDIC SURGERY
Payer: COMMERCIAL

## 2024-11-05 PROCEDURE — 97530 THERAPEUTIC ACTIVITIES: CPT

## 2024-11-05 PROCEDURE — 97035 APP MDLTY 1+ULTRASOUND EA 15: CPT

## 2024-11-05 PROCEDURE — 97110 THERAPEUTIC EXERCISES: CPT

## 2024-11-05 NOTE — PROGRESS NOTES
Diagnosis:        Arm laceration, left, initial encounter (S41.112A)  %, FDS MF 90%, RF FDS 10%, %, median nerve partial, cutaneous br palm lacerated.    Referring Provider: Tyson  Date of Evaluation:    9/9/24    Precautions:   per protocol   Next MD/PA visit: 11/15/24  DOI: unknown  Date of Surgery: 8/17/24  Procedure Performed:   LEFT FOREARM WOUND EXPLORATION, REPAIR OF TENDON AND MEDIAN NERVE   Insurance Primary/Secondary: [employer] [place] PPO / N/A     # Auth Visits: n/a              Orders:   Order Date:  9/9/2024  Authorizing Provider:   KJ ROA     Procedure:  OP REFERRAL TO [facility] [[id number]]         Order #:   [id number]  Qty:  1     Priority:  Routine                   Class:   IHP - RFL     Standing Interval:          Standing Occurrences:          Expires on:            Expected by:    Associated DX:  Arm laceration, left, initial encounter (S41.112A)     Order summary:  IHP - RFL, Routine, 8 visits  Occupational  Therapy Area of Concentration: Orthopedic  Occupational Therapy Ortho: UE  If the patient can be seen sooner with a PT vs an OT, can we cancel this order and replace with a PT order? No         Comments:  [facility] Flexor Tendon Protocol (FCR and FDS zone 5 repair)          SUBJECTIVE:  Reports he is unable to discern if sensation is improving.    Pain: 1/10 with heavy lifting    OBJECTIVE:        OUTCOME MEASURE   On Initial Evaluation:  QuickDASH Outcome Score  Score: 81.82 % (9/9/2024  4:51 PM)      Interim  Post QuickDASH Outcome Score  Post Score: 50 % (10/15/2024 11:06 AM)  31.82 % improvement        Date: 9/20/24  LEFT  Digital AROM (°)  Index Finger  Middle Finger  Ring Finger  Small Finger    MP Ext/Flex  25/75 25/80 0/75 0/80   PIP Ext/Flex  0/80 0/85 0/90 0/90   DIP Ext/Flex  0/50 0/55 0/45 0/60   CALDWELL  180 195 210 230       Date: 9/27/24  LEFT  Digital AROM (°)  Index Finger  Middle Finger  Ring Finger  Small Finger    MP Ext/Flex  5/85 5/90  0/85 0/90   PIP Ext/Flex  0/95 0/95 0/90 0/95   DIP Ext/Flex  0/65 0/65 0/60 0/75   CALDWELL  240 245 235 260     Date: 10/10/24  LEFT  Digital AROM (°)  Index Finger  Middle Finger  Ring Finger  Small Finger    MP Ext/Flex  0/85 5/90 0/85 0/90   PIP Ext/Flex  0/95 0/95 0/90 0/95   DIP Ext/Flex  0/65 0/65 0/60 0/75   CALDWELL  245 245 235 260         Wrist AROM  9/20/24: ext 40, flexion 40  9/27/24: ext 60, flexion 60  10/1/24: ext 60, flexion 70  10/10/24: ext 65, flexion 70  10/31/24: ext: 60, flexion 70 (right: ext/flex: 65/60)    9/20/24 Thumb AROM: Opposition not true, achieves to [place] 10 with flexion of CMC, MP and IPJs       Strength (lbs)  Right/Left Right  9/9/24 Left   9/9/24 Right/left 10/15/24    NT NT 60, 65, 65/30, 39, 40   3 Point Pinch  NT NT 17/5   Lateral Pinch NT NT 12/7   NT= Not tested due to acuity           Date 10/17/24 10/22/24 10/24/24 10/29/24 10/31/24 11/5/24 11/7/24   /Visit # 12 13 14 15 16 17 18   # of visits authorized:           # of visits in OT POC:  16 20 updated on PN 20 20 20 20 20   Evaluation          Progress Report written          Manual Therapy 4 10 5 5 5 5 5   Scar massage  x x x x x x    STM  x        IASTM Scar extractor applied x Scar extractor Scar extractor Scar extractor Scar extractor Scar extractor                        Ther ex 15 15 20 20 25 12 10   Passive stretches to wrist  Into red power   Green power web Blue power web Blue power web   Hand gripper with pegs  First rung, 2 minutes Second rung  Second rung Second rung Third rung   Door dusting   x  x x     PROM into wrist flexion with gravity assist and active ext within orthosis           Intrinsic stretches           Fluidotherapy Fluidotherapy for 10 minutes to left hand, with AROM x digits and wrist performed.   Fluidotherapy for 10 minutes to left hand, with AROM x digits and wrist performed. Fluidotherapy for 10 minutes to left hand, with AROM x digits and wrist performed. Fluidotherapy for 10  minutes to left hand, with AROM x digits and wrist performed. Fluidotherapy for 10 minutes to left hand, with AROM x digits and wrist performed. Fluidotherapy for 10 minutes to left hand, with AROM x digits and wrist performed. Fluidotherapy for 10 minutes to left hand, with AROM x digits and wrist performed.   Sci Fit    2 and 2 minutes each direction 3 and 3     Thumb web space stretches           strengthening  (Red power web)        Pinch strengthening  Yellow clip Red clip Red clip Green clip Green clip    Wrist PRE's 1#, 3 sets of 10 each direction 2#, 3 sets of 15 each reviewed reviewed       HEP/  Patient education topics Wrist PRE's, dorsal orthosis Thumb orthosis objective is to position thumb in static position for function. Tape removal with oil or lotion, cues to slow pace and allow for full stretches of tissue Use of orthoses, tape removal Door dusting OT POC Thenar muscle activation   Therapeutic Activity 8 12 15 12 22 20 22   Light prehension With orthosis on: stones                   flexbar yellow Red  Red  Red  green green green   Power web Yellow power web Red:  15x   green blue blue   Bulk putty  Tan  Tan  Tan  Red Red     Lift carry bucket      10# Crate: 22#   Overhead reaching      5# up to cabinet, 10x    Push/pull sled       50#   FMC activity       Purdue pegboard with and without tweezers   Lacrosse ball on game tray       x   Body blade     1 minutes all directions Long, 1 minute all direction    Self care training           Objective measurements taken and reviewed with patient   x  x     Neuromuscular Re-education          Sensory re-ed                                        Orthotic fitting and training Fabricated custom dorsal thumb short opponens orthosis. 10 min   Reheated and remolded dorsal thumb splint and provided complete relief off of the radial styloid; reheated and remolded night orthosis to achieve thumb in more opp than in the plain of the hand      Taping   Rock  tape applied in fascial lift to volar right FA Rock tape applied in fascial lift to volar right FA Deferred due to patient having just removed tape this morning Rock tape applied in fascial lift to volar right FA Deferred due to patient having removed it this morning   Modalities Ultrasound:  3mHz  0.8w/cm2  100%  to volar entire scar area for 8 minutes.   Ultrasound:  3mHz  0.8w/cm2  100%  to volar entire scar area for 8 minutes. Ultrasound:  3mHz  0.8w/cm2  100%  to volar entire scar area for 8 minutes. Ultrasound:  3mHz  0.8w/cm2  100%  to volar entire scar area for 8 minutes. Ultrasound:  3mHz  0.8w/cm2  100%  to volar entire scar area for 8 minutes. Ultrasound:  3mHz  0.8w/cm2  100%  to volar entire scar area for 8 minutes. Ultrasound:  3mHz  0.8w/cm2  100%  to volar entire scar area for 8 minutes.   X= performed this date as previously outlined    HEP  On initial eval, patient education was provided on exam findings, treatment diagnosis, treatment plan, expectations, and prognosis. Patient was also provided recommendations for use of orthosis.    HEP/Education topic Date Issued Date modified Date discharged Comments    PROM, AROM within orthosis 9/9/24      Light prehension with orthosis on 9/23/24      Soft putty issued for pinch and  10/15/24                      ASSESSMENT  Patient presents with improved activation of thenar muscles, but do expect there is some compensation with ulnar/deep head of the FPB.  Able to lift and carry, push/pull heavier objects without issue.      Goals: (to be met in 20 visits)  Patient will be independent and compliant with comprehensive HEP to maintain progress achieved in OT. (Progressing)  Patient will present with increase in left digit 2-5 CALDWELL to 200 to allow for ease with gripping toothbrush. (Met)  New Goal: Patient will present with full digital extension with wrist in at least 40 degrees of extension, demonstrating increase in scar/soft tissue flexibility.  (Progressing)  Patient will report no more than 1/10 pain in left hand/arm during self care activities.(Progressing)  Patient will present with  strength in the left hand to that of 50% of the contralateral hand in order to be able to perform gripping of sports tools, bike handles. (Progressing)  Patient will demonstrate an improvement in the self rated DASH score to 25% or better to reflect functional gains reported, in the achievement of opening a jar, playing sports and in daily skills. (Progressing)  Patient will demonstrate individual finger movements for typing with 75% accuracy. (Progressing)  Patient will present with sufficient ROM and strength in the left hand/arm to return to work, self care, homemaking and leisure skill independent performance. (Progressing)    Additional goals may be established as clinically indicated.        PLAN:  Frequency / Duration: Patient will be seen for 2 x/week or a total of 20 visits over a 90 day period.  Treatment will include: Manual Therapy, Neuromuscular Re-education, Self-Care Home Management, Therapeutic Activities, Therapeutic Exercise, Home Exercise Program instruction, Modalities to include: Ultrasound and hot packs, FT, and taping and custom splinting.    Next session: per protocol, continue US, FT, Body Blade, push/pull sled;  Sci Fit, door dusting, weighted ball;  lift, carry crate; taping prn; physioball on wall; begin transition to HEP and self management.    Charges: 1 US 1 TE, 1 TA,     Total Timed Treatment: 45 minutes      Total Treatment Time: 45 minutes    JAMEL Dubois, OTR/L, CHT

## 2024-11-07 ENCOUNTER — OFFICE VISIT (OUTPATIENT)
Dept: OCCUPATIONAL MEDICINE | Age: 59
End: 2024-11-07
Attending: ORTHOPAEDIC SURGERY
Payer: COMMERCIAL

## 2024-11-07 PROCEDURE — 97035 APP MDLTY 1+ULTRASOUND EA 15: CPT

## 2024-11-07 PROCEDURE — 97530 THERAPEUTIC ACTIVITIES: CPT

## 2024-11-07 PROCEDURE — 97110 THERAPEUTIC EXERCISES: CPT

## 2024-11-08 NOTE — PROGRESS NOTES
Terry  Pt has attended 19 visits in Occupational Therapy.     Diagnosis:        Arm laceration, left, initial encounter (S41.112A)  %, FDS MF 90%, RF FDS 10%, %, median nerve partial, cutaneous br palm lacerated.     Referring Provider: Tyson  Date of Evaluation:    9/9/24    Precautions:   per protocol   Next MD/PA visit: 11/15/24  DOI: unknown  Date of Surgery: 8/17/24  Procedure Performed:   LEFT FOREARM WOUND EXPLORATION, REPAIR OF TENDON AND MEDIAN NERVE   Insurance Primary/Secondary: [employer] PPO / N/A     # Auth Visits: n/a                Orders:   Order Date:  9/9/2024  Authorizing Provider:   KJ RAO     Procedure:  OP REFERRAL TO [place] [facility] [[id number]]         Order #:   [id number]  Qty:  1     Priority:  Routine                   Class:   IHP - RFL     Standing Interval:          Standing Occurrences:          Expires on:            Expected by:    Associated DX:  Arm laceration, left, initial encounter (S41.112A)     Order summary:  IHP - RFL, Routine, 8 visits  Occupational  Therapy Area of Concentration: Orthopedic  Occupational Therapy Ortho: UE  If the patient can be seen sooner with a PT vs an OT, can we cancel this order and replace with a PT order? No         Comments:  [facility] Flexor Tendon Protocol (FCR and FDS zone 5 repair)           SUBJECTIVE:  \"I'm most concerned about the scar tissue management.\"     Pain: 1/10     OBJECTIVE:        OUTCOME MEASURE   On Initial Evaluation:  QuickDASH Outcome Score  Score: 81.82 % (9/9/2024  4:51 PM)        Interim  Post QuickDASH Outcome Score  Post Score: 50 % (10/15/2024 11:06 AM)  31.82 % improvement          Discharge  Post QuickDASH Outcome Score  Post Score: 22.73 % (11/13/2024 12:35 PM)  59.09 % improvement        Date: 9/20/24  LEFT  Digital AROM (°)  Index Finger  Middle Finger  Ring Finger  Small Finger    MP Ext/Flex  25/75 25/80 0/75 0/80   PIP Ext/Flex  0/80 0/85 0/90 0/90   DIP Ext/Flex   0/50 0/55 0/45 0/60   CALDWELL  180 195 210 230         Date: 9/27/24  LEFT  Digital AROM (°)  Index Finger  Middle Finger  Ring Finger  Small Finger    MP Ext/Flex  5/85 5/90 0/85 0/90   PIP Ext/Flex  0/95 0/95 0/90 0/95   DIP Ext/Flex  0/65 0/65 0/60 0/75   CALDWELL  240 245 235 260      Date: 10/10/24  LEFT  Digital AROM (°)  Index Finger  Middle Finger  Ring Finger  Small Finger    MP Ext/Flex  0/85 5/90 0/85 0/90   PIP Ext/Flex  0/95 0/95 0/90 0/95   DIP Ext/Flex  0/65 0/65 0/60 0/75   CALDWELL  245 245 235 260            Wrist AROM  9/20/24: ext 40, flexion 40  9/27/24: ext 60, flexion 60  10/1/24: ext 60, flexion 70  10/10/24: ext 65, flexion 70  10/31/24: ext: 60, flexion 70 (right: ext/flex: 65/60)     9/20/24 Thumb AROM: Opposition not true, achieves to [place] 10 with flexion of CMC, MP and IPJs         Strength (lbs)  Right/Left Right  9/9/24 Left   9/9/24 Right/left 10/15/24 Left 11/13/24    NT NT 60, 65, 65/30, 39, 40 40, 41, 42   3 Point Pinch  NT NT 17/5 8   Lateral Pinch NT NT 12/7 10   NT= Not tested due to acuity               Date 10/24/24 10/29/24 10/31/24 11/5/24 11/7/24 11/13/24   /Visit # 14 15 16 17 18 19   # of visits authorized:                # of visits in OT POC:  20 20 20 20 20 20   Evaluation              Progress Report written               Manual Therapy 5 5 5 5 5 5   Scar massage x x x x x x    STM               IASTM Scar extractor Scar extractor Scar extractor Scar extractor Scar extractor Scar extractor                                   Ther ex 20 20 25 12 10 22   Passive stretches to wrist     Green power web Blue power web Blue power web Black power web   Hand gripper with pegs Second rung   Second rung Second rung Third rung Third rung   Door dusting x   x x        PROM into wrist flexion with gravity assist and active ext within orthosis                Intrinsic stretches                Fluidotherapy Fluidotherapy for 10 minutes to left hand, with AROM x digits and wrist  performed. Fluidotherapy for 10 minutes to left hand, with AROM x digits and wrist performed. Fluidotherapy for 10 minutes to left hand, with AROM x digits and wrist performed. Fluidotherapy for 10 minutes to left hand, with AROM x digits and wrist performed. Fluidotherapy for 10 minutes to left hand, with AROM x digits and wrist performed. Fluidotherapy 5 minutes AROM to left wrist   Sci Fit   2 and 2 minutes each direction 3 and 3        Thumb web space stretches                strengthening               Pinch strengthening Red clip Red clip Green clip Green clip   blue   Wrist PRE's reviewed reviewed            HEP/  Patient education topics Tape removal with oil or lotion, cues to slow pace and allow for full stretches of tissue Use of orthoses, tape removal Door dusting OT POC Thenar muscle activation Final HEP   Therapeutic Activity 15 12 22 20 22 10   Self management training           x                   flexbar Red  Red  green green green    Power web     green blue blue black   Bulk putty Tan  Tan  Red Red       Lift carry bucket       10# Crate: 22#    Overhead reaching       5# up to cabinet, 10x       Push/pull sled         50#    FMC activity         Purdue pegboard with and without tweezers    Lacrosse ball on game tray         x    Body blade     1 minutes all directions Long, 1 minute all direction       Self care training                Objective measurements taken and reviewed with patient x   x      x   Neuromuscular Re-education               Sensory re-ed                                                               Orthotic fitting and training   Reheated and remolded dorsal thumb splint and provided complete relief off of the radial styloid; reheated and remolded night orthosis to achieve thumb in more opp than in the plain of the hand           Taping Rock tape applied in fascial lift to volar right FA Rock tape applied in fascial lift to volar right FA Deferred due to patient having  just removed tape this morning Rock tape applied in fascial lift to volar right FA Deferred due to patient having removed it this morning    Modalities Ultrasound:  3mHz  0.8w/cm2  100%  to volar entire scar area for 8 minutes. Ultrasound:  3mHz  0.8w/cm2  100%  to volar entire scar area for 8 minutes. Ultrasound:  3mHz  0.8w/cm2  100%  to volar entire scar area for 8 minutes. Ultrasound:  3mHz  0.8w/cm2  100%  to volar entire scar area for 8 minutes. Ultrasound:  3mHz  0.8w/cm2  100%  to volar entire scar area for 8 minutes. Ultrasound:  3mHz  0.8w/cm2  100%  to volar entire scar area for 8 minutes.   X= performed this date as previously outlined     HEP  On initial eval, patient education was provided on exam findings, treatment diagnosis, treatment plan, expectations, and prognosis. Patient was also provided recommendations for use of orthosis.     HEP/Education topic Date Issued Date modified Date discharged Comments    PROM, AROM within orthosis 9/9/24         Light prehension with orthosis on 9/23/24         Soft putty issued for pinch and  10/15/24                                    ASSESSMENT  Patient is a 60 yo male, who has been seen in Occupational Therapy since initial eval on 9/9/24, with last treatment session on 11/13/24.  The patient has attended 19 scheduled appointments, with 0 no shows and 1 cancellations.  Focus of skilled OT has been on ROM, scar tissue, pain, and strength, with use of interventions including therapeutic activities, therapeutic exercises, modalities such as US and FT, wound care, scar management, manual therapy, adapted ADL training,  neuromuscular re-ed, taping, and custom splinting to achieve the functional goals listed below. He has shown improvement in ROM, strength and soft tissue flexibility, with functional improvements reported as in gripping objects, typing, riding bike, and in daily occupation engagement.  Patient rated Outcome measure of the Quick DASH indicates  improvement as well.  Limitations and barriers toward progress include: none.  He has met the goals as noted below, reaching maximal benefit from skilled OT, and is ready for transition to home program at this time.       Goals: (to be met in 20 visits)  Patient will be independent and compliant with comprehensive HEP to maintain progress achieved in OT. (Met)  Patient will present with increase in left digit 2-5 CALDWELL to 200 to allow for ease with gripping toothbrush. (Met)  New Goal: Patient will present with full digital extension with wrist in at least 40 degrees of extension, demonstrating increase in scar/soft tissue flexibility. (Met at 45 degrees)  Patient will report no more than 1/10 pain in left hand/arm during self care activities.(Met)  Patient will present with  strength in the left hand to that of 50% of the contralateral hand in order to be able to perform gripping of sports tools, bike handles. (Met)  Patient will demonstrate an improvement in the self rated DASH score to 25% or better to reflect functional gains reported, in the achievement of opening a jar, playing sports and in daily skills. (Met)  Patient will demonstrate individual finger movements for typing with 75% accuracy. (Reports he believes he has 50%, improvement from initial inability to type)  Patient will present with sufficient ROM and strength in the left hand/arm to return to work, self care, homemaking and leisure skill independent performance. (Met)     Additional goals may be established as clinically indicated.           PLAN:  Discharge OT and continue with HEP and self management.     Charges: 1 US 1 TE, 1 TA,      Total Timed Treatment: 45 minutes                              Total Treatment Time: 45 minutes     JAMEL Dubois, OTR/L, CHT

## 2024-11-11 ENCOUNTER — APPOINTMENT (OUTPATIENT)
Dept: OCCUPATIONAL MEDICINE | Age: 59
End: 2024-11-11
Attending: ORTHOPAEDIC SURGERY
Payer: COMMERCIAL

## 2024-11-13 ENCOUNTER — OFFICE VISIT (OUTPATIENT)
Dept: OCCUPATIONAL MEDICINE | Age: 59
End: 2024-11-13
Attending: ORTHOPAEDIC SURGERY
Payer: COMMERCIAL

## 2024-11-13 PROCEDURE — 97530 THERAPEUTIC ACTIVITIES: CPT

## 2024-11-13 PROCEDURE — 97035 APP MDLTY 1+ULTRASOUND EA 15: CPT

## 2024-11-13 PROCEDURE — 97110 THERAPEUTIC EXERCISES: CPT

## 2024-11-14 ENCOUNTER — PROCEDURE VISIT (OUTPATIENT)
Dept: PHYSICAL MEDICINE AND REHAB | Facility: CLINIC | Age: 59
End: 2024-11-14
Payer: COMMERCIAL

## 2024-11-14 ENCOUNTER — TELEPHONE (OUTPATIENT)
Dept: SURGERY | Facility: CLINIC | Age: 59
End: 2024-11-14

## 2024-11-14 DIAGNOSIS — M21.372 LEFT FOOT DROP: ICD-10-CM

## 2024-11-14 NOTE — TELEPHONE ENCOUNTER
Returning pt's call as he had some questions regarding his upcoming procedure. I let him know that I did run the codes through his [employer] online portal and it came back stating no prior authorization was required.   Pt understood that is why he didn't receive anything in the mail and there was no record of my call with his [employer], as the verification was done online.   Pt had not other questions for me at this time, verbalized understanding and was appreciative of my call.

## 2024-11-14 NOTE — PROGRESS NOTES
This is study.    1.  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  2.  There is no evidence to support a left-sided tibial mononeuropathy.  3.  There is no evidence to support a left-sided lumbosacral radiculopathy.  4.  There is no evidence to support a length-dependent peripheral polyneuropathy.    Fredy Byrnes DO  Interventional Spine and Sports Medicine Specialist   Physical Medicine and Rehabilitation  [address] [address]    [facility]  [address] [place] [address]

## 2024-11-14 NOTE — PROCEDURES
[facility]  [address] [place] [id number]        Full Name: PRAKASH WINN Gender: Male  Patient ID: [id number] YOB: 1965      Visit Date: 11/14/2024 4:01 PM  Age: 59 Years  Examining Physician: DO JOSÉ  Referring Physician: MD COLLEEN  Height: 6 feet 0 inch  Weight: 163 lbs  History: EMG LLE-Patient c/o N/T in his left leg that started about 5 months ago without injury. Denies hx of diabetes, denies thyroid issues, denies daily alcohol.      Sensory NCS      Nerve / Sites Rec. Site Onset Lat Peak Lat NP Amp PP Amp Segments Distance Velocity Comment     ms ms µV µV  cm m/s    L Sural - (Antidromic)      Calf Ankle 3.59 4.48 12.5 9.6 Calf - Ankle 14 39    L Superficial peroneal - (Antidromic)      Lat leg Ankle 3.27 4.02 6.0 6.0 Lat leg - Ankle 14 43       Lat leg Ankle 3.13 3.96 6.6 8.2           Motor NCS      Nerve / Sites Muscle Latency Amplitude Segments Dist. Lat Diff Velocity Comments     ms mV  cm ms m/s    L Peroneal - EDB      Ankle EDB 4.38 1.7 Ankle - EDB 8         B. Fib Head EDB 11.33 1.4 B. Fib Head - Ankle 32 6.96 46.0       A. Fib Head EDB 14.42 1.1 A. Fib Head - B. Fib Head 13 3.08 42.2    L Tibial - AH      Ankle AH 4.60 13.1 Ankle - AH 8         Knee AH 12.60 10.9 Knee - Ankle 39 8.00 48.8        EMG Summary Table     Spontaneous MUAP Recruitment   Muscle IA Fib PSW Fasc H.F. Amp Dur. PPP Pattern   L. Vastus medialis N None None None None N N N N   L. Tibialis anterior 1+ 2+ 2+ None None N N 1+ Reduced   L. Tibialis posterior N None None None None N N N N   L. Peroneus longus 1+ 1+ 1+ None None 1+ N N Reduced   L. Gastrocnemius (Medial head) N None None None None N N N N       Summary    The motor conduction test was normal in all 2 of the tested nerves: L Peroneal - EDB, L Tibial - AH.    The sensory conduction test was normal in all 2 of the tested nerves: L Sural - (Antidromic), L Superficial peroneal - (Antidromic).    The needle EMG examination was  performed in 5 muscles. It was normal in 3 muscle(s): L. Vastus medialis, L. Tibialis posterior, L. Gastrocnemius (Medial head). The study was abnormal in 2 muscle(s), with the following distribution:  Abnormal spontaneous/insertional activity was found in L. Tibialis anterior, L. Peroneus longus.  The MUP waveform abnormality was found in L. Tibialis anterior, L. Peroneus longus.  Abnormal interference pattern was found in L. Tibialis anterior, L. Peroneus longus.        PRAKASH WINN [id number] 11/14/2024 4:01 PM     3 of 3    Conclusion:     This is study.     1.  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  2.  There is no evidence to support a left-sided tibial mononeuropathy.  3.  There is no evidence to support a left-sided lumbosacral radiculopathy.  4.  There is no evidence to support a length-dependent peripheral polyneuropathy.    Fredy Byrnes DO  Interventional Spine and Sports Medicine Specialist   Physical Medicine and Rehabilitation  [address] [address]    [address] [place] [address]      ________________________  DO PRAKASH BYRNES [id number] 11/14/2024 4:01 PM     3 of 3

## 2024-11-15 ENCOUNTER — OFFICE VISIT (OUTPATIENT)
Dept: ORTHOPEDICS CLINIC | Facility: CLINIC | Age: 59
End: 2024-11-15
Payer: COMMERCIAL

## 2024-11-15 VITALS — HEIGHT: 71 IN | BODY MASS INDEX: 22.68 KG/M2 | WEIGHT: 162 LBS

## 2024-11-15 DIAGNOSIS — S41.112A ARM LACERATION, LEFT, INITIAL ENCOUNTER: Primary | ICD-10-CM

## 2024-11-15 PROCEDURE — 99024 POSTOP FOLLOW-UP VISIT: CPT | Performed by: ORTHOPAEDIC SURGERY

## 2024-11-15 PROCEDURE — 3008F BODY MASS INDEX DOCD: CPT | Performed by: ORTHOPAEDIC SURGERY

## 2024-11-15 NOTE — PROGRESS NOTES
Clinic Note     Assessment/Plan:  59 year old male    Status post left wrist FCR, FDS middle finger, and segmental partial median nerve laceration repair on 8/17/2024-transition to home exercise program.  Tendon transfer for thumb opposition if APB muscle recovery is not achieved.  Follow-up in 2 months for evaluation.    Follow Up: 2 months    Diagnostic Studies:     None       Physical Exam:     Ht 5' 11\" (1.803 m)   Wt 162 lb (73.5 kg)   BMI 22.59 kg/m²     Constitutional: NAD. AOx3. Well-developed and Well-nourished.   Psychiatric: Normal mood/ affect/ behavior. Judgment and thought content normal.     Left Upper Extremity:     Inspection    Skin incisions well-healed without signs of infection   Palpation    Tinel's at the proximal laceration site      ROM    Normal symmetric elbow motion.    Normal wrist and finger motion.     Neurovascular    Normal sensation in ulnar, and radial nerve distribution. Normal motor function of muscles innervated by AIN, ulnar, and radial/PIN nerves.    Thumb radial 80% numbness  Thumb ulnar 50% numbness  Index finger radial 40% numbness  Index finger ulnar 40% numbness  Middle finger radial 90% numbness  Middle finger ulnar 90% numbness  Ring finger radial 70% numbness  Unchanged on examination today on 11/15    Lack of APB function    Normally perfused hand(s).     Special    None          CC: Status post flexor tendon and median nerve repair on 8/17/2024    HPI: This 59 year old RHD male doing well postoperatively with minimal pain at this point.  He has been adherent to the splinting.  Notes some buzzing in the hand    Interval Hx (10/4/2024): Patient progressing from a therapy standpoint.  Minimal to no pain currently.  Numbness has not changed.  Notes thumb dysfunction    Interval Hx (11/15/2024): No significant improvement in the numbness or thumb APB function.  He is able to make a full composite fist and is ready to return to work without  restrictions.      History/Other:   Past Medical History:    Anxiety    Back pain    Cancer (HCC)    BASAL CELL CA LEFT EAR    Depression    Hematuria    Nerve damage     Past Surgical History:   Procedure Laterality Date    Arthroscopy of joint unlisted      left knee scope    Colonoscopy N/A 01/29/2016    Procedure: COLONOSCOPY;  Surgeon: Belem Tobias MD;  Location:  ENDOSCOPY    Other surgical history  08/2024    colectomy    Other surgical history Left 08/2024    wrist     Current Outpatient Medications   Medication Sig Dispense Refill    LORazepam 2 MG Oral Tab Take 1 tablet (2 mg total) by mouth nightly. 30 tablet 0    LORazepam 0.5 MG Oral Tab Take 1 tablet (0.5 mg total) by mouth nightly as needed for Anxiety. 30 tablet 0    sertraline (ZOLOFT) 100 MG Oral Tab Take 1.5 tablets (150 mg total) by mouth every morning. 45 tablet 0    QUEtiapine 200 MG Oral Tab Take 1 tablet (200 mg total) by mouth nightly. 30 tablet 0    QUEtiapine 200 MG Oral Tab Take 1 tablet (200 mg total) by mouth nightly. 30 tablet 0    Multiple Vitamin (MULTI VITAMIN MENS) Oral Tab Take 1 tablet by mouth daily.       No Known Allergies  Family History   Problem Relation Age of Onset    Lipids Father     Hypertension Father     Cancer Father     Other (HTN) Father     Suicide History Mother     Depression Mother     Other (Other) Brother      Social History     Occupational History    Not on file   Tobacco Use    Smoking status: Never    Smokeless tobacco: Never   Vaping Use    Vaping status: Never Used   Substance and Sexual Activity    Alcohol use: Not Currently     Comment: \"a few\"    Drug use: Never    Sexual activity: Not on file          Review of Systems (negative unless bolded):  General: fevers, chills, fatigue  CV:  chest pain, palpitations, leg swelling  Msk: bodyaches, neck pain, neck stiffness  Skin: rashes, open wounds, nonhealing ulcers  Hem: bleeds easily, bruise easily, immunocompromised  Neuro: dizziness, light  headedness, headaches  Psych: anxious, depressed, anger issues      Martell Mehta MD   Hand, Wrist, & Elbow Surgery  [email or web address]  t: [phone]  f: [phone]

## 2024-11-18 ENCOUNTER — ANESTHESIA EVENT (OUTPATIENT)
Dept: SURGERY | Facility: HOSPITAL | Age: 59
End: 2024-11-18
Payer: COMMERCIAL

## 2024-11-19 ENCOUNTER — HOSPITAL ENCOUNTER (OUTPATIENT)
Facility: HOSPITAL | Age: 59
Setting detail: HOSPITAL OUTPATIENT SURGERY
Discharge: HOME | End: 2024-11-19
Attending: SURGERY | Admitting: SURGERY
Payer: COMMERCIAL

## 2024-11-19 ENCOUNTER — ANESTHESIA (OUTPATIENT)
Dept: SURGERY | Facility: HOSPITAL | Age: 59
End: 2024-11-19
Payer: COMMERCIAL

## 2024-11-19 VITALS
RESPIRATION RATE: 16 BRPM | BODY MASS INDEX: 22.82 KG/M2 | SYSTOLIC BLOOD PRESSURE: 122 MMHG | HEIGHT: 71 IN | OXYGEN SATURATION: 96 % | DIASTOLIC BLOOD PRESSURE: 72 MMHG | WEIGHT: 163 LBS | TEMPERATURE: 98 F | HEART RATE: 76 BPM

## 2024-11-19 DIAGNOSIS — C44.219 BASAL CELL CARCINOMA (BCC) OF LEFT EAR: Primary | ICD-10-CM

## 2024-11-19 PROCEDURE — 0HX1XZZ TRANSFER FACE SKIN, EXTERNAL APPROACH: ICD-10-PCS | Performed by: SURGERY

## 2024-11-19 PROCEDURE — 09U1X7Z SUPPLEMENT LEFT EXTERNAL EAR WITH AUTOLOGOUS TISSUE SUBSTITUTE, EXTERNAL APPROACH: ICD-10-PCS | Performed by: SURGERY

## 2024-11-19 RX ORDER — LIDOCAINE HYDROCHLORIDE AND EPINEPHRINE 10; 10 MG/ML; UG/ML
INJECTION, SOLUTION INFILTRATION; PERINEURAL AS NEEDED
Status: DISCONTINUED | OUTPATIENT
Start: 2024-11-19 | End: 2024-11-19 | Stop reason: HOSPADM

## 2024-11-19 RX ORDER — ACETAMINOPHEN 500 MG
1000 TABLET ORAL ONCE AS NEEDED
Status: DISCONTINUED | OUTPATIENT
Start: 2024-11-19 | End: 2024-11-19

## 2024-11-19 RX ORDER — MEPERIDINE HYDROCHLORIDE 25 MG/ML
12.5 INJECTION INTRAMUSCULAR; INTRAVENOUS; SUBCUTANEOUS AS NEEDED
Status: DISCONTINUED | OUTPATIENT
Start: 2024-11-19 | End: 2024-11-19

## 2024-11-19 RX ORDER — HYDROMORPHONE HYDROCHLORIDE 1 MG/ML
0.2 INJECTION, SOLUTION INTRAMUSCULAR; INTRAVENOUS; SUBCUTANEOUS EVERY 5 MIN PRN
Status: DISCONTINUED | OUTPATIENT
Start: 2024-11-19 | End: 2024-11-19

## 2024-11-19 RX ORDER — MIDAZOLAM HYDROCHLORIDE 1 MG/ML
1 INJECTION INTRAMUSCULAR; INTRAVENOUS EVERY 5 MIN PRN
Status: DISCONTINUED | OUTPATIENT
Start: 2024-11-19 | End: 2024-11-19

## 2024-11-19 RX ORDER — ROCURONIUM BROMIDE 10 MG/ML
INJECTION, SOLUTION INTRAVENOUS AS NEEDED
Status: DISCONTINUED | OUTPATIENT
Start: 2024-11-19 | End: 2024-11-19 | Stop reason: SURG

## 2024-11-19 RX ORDER — GLYCOPYRROLATE 0.2 MG/ML
INJECTION, SOLUTION INTRAMUSCULAR; INTRAVENOUS AS NEEDED
Status: DISCONTINUED | OUTPATIENT
Start: 2024-11-19 | End: 2024-11-19 | Stop reason: SURG

## 2024-11-19 RX ORDER — DEXAMETHASONE SODIUM PHOSPHATE 4 MG/ML
VIAL (ML) INJECTION AS NEEDED
Status: DISCONTINUED | OUTPATIENT
Start: 2024-11-19 | End: 2024-11-19 | Stop reason: SURG

## 2024-11-19 RX ORDER — ONDANSETRON 2 MG/ML
INJECTION INTRAMUSCULAR; INTRAVENOUS AS NEEDED
Status: DISCONTINUED | OUTPATIENT
Start: 2024-11-19 | End: 2024-11-19 | Stop reason: SURG

## 2024-11-19 RX ORDER — SODIUM CHLORIDE, SODIUM LACTATE, POTASSIUM CHLORIDE, CALCIUM CHLORIDE 600; 310; 30; 20 MG/100ML; MG/100ML; MG/100ML; MG/100ML
INJECTION, SOLUTION INTRAVENOUS CONTINUOUS
Status: DISCONTINUED | OUTPATIENT
Start: 2024-11-19 | End: 2024-11-19

## 2024-11-19 RX ORDER — NEOMYCIN SULFATE, POLYMYXIN B SULFATE AND BACITRACIN ZINC 3.5; 10000; 4 MG/G; [USP'U]/G; [USP'U]/G
OINTMENT OPHTHALMIC AS NEEDED
Status: DISCONTINUED | OUTPATIENT
Start: 2024-11-19 | End: 2024-11-19 | Stop reason: HOSPADM

## 2024-11-19 RX ORDER — LIDOCAINE HYDROCHLORIDE 40 MG/ML
SOLUTION TOPICAL AS NEEDED
Status: DISCONTINUED | OUTPATIENT
Start: 2024-11-19 | End: 2024-11-19 | Stop reason: SURG

## 2024-11-19 RX ORDER — LABETALOL HYDROCHLORIDE 5 MG/ML
5 INJECTION, SOLUTION INTRAVENOUS EVERY 5 MIN PRN
Status: DISCONTINUED | OUTPATIENT
Start: 2024-11-19 | End: 2024-11-19

## 2024-11-19 RX ORDER — LIDOCAINE HYDROCHLORIDE 10 MG/ML
INJECTION, SOLUTION EPIDURAL; INFILTRATION; INTRACAUDAL; PERINEURAL AS NEEDED
Status: DISCONTINUED | OUTPATIENT
Start: 2024-11-19 | End: 2024-11-19 | Stop reason: SURG

## 2024-11-19 RX ORDER — PHENYLEPHRINE HCL 10 MG/ML
VIAL (ML) INJECTION AS NEEDED
Status: DISCONTINUED | OUTPATIENT
Start: 2024-11-19 | End: 2024-11-19 | Stop reason: SURG

## 2024-11-19 RX ORDER — NEOSTIGMINE METHYLSULFATE 1 MG/ML
INJECTION INTRAVENOUS AS NEEDED
Status: DISCONTINUED | OUTPATIENT
Start: 2024-11-19 | End: 2024-11-19 | Stop reason: SURG

## 2024-11-19 RX ORDER — ACETAMINOPHEN 500 MG
1000 TABLET ORAL ONCE
Status: DISCONTINUED | OUTPATIENT
Start: 2024-11-19 | End: 2024-11-19 | Stop reason: HOSPADM

## 2024-11-19 RX ORDER — HYDROMORPHONE HYDROCHLORIDE 1 MG/ML
0.4 INJECTION, SOLUTION INTRAMUSCULAR; INTRAVENOUS; SUBCUTANEOUS EVERY 5 MIN PRN
Status: DISCONTINUED | OUTPATIENT
Start: 2024-11-19 | End: 2024-11-19

## 2024-11-19 RX ORDER — HYDROCODONE BITARTRATE AND ACETAMINOPHEN 5; 325 MG/1; MG/1
1 TABLET ORAL ONCE AS NEEDED
Status: DISCONTINUED | OUTPATIENT
Start: 2024-11-19 | End: 2024-11-19

## 2024-11-19 RX ORDER — HYDROMORPHONE HYDROCHLORIDE 1 MG/ML
0.6 INJECTION, SOLUTION INTRAMUSCULAR; INTRAVENOUS; SUBCUTANEOUS EVERY 5 MIN PRN
Status: DISCONTINUED | OUTPATIENT
Start: 2024-11-19 | End: 2024-11-19

## 2024-11-19 RX ORDER — SCOLOPAMINE TRANSDERMAL SYSTEM 1 MG/1
1 PATCH, EXTENDED RELEASE TRANSDERMAL ONCE
Status: DISCONTINUED | OUTPATIENT
Start: 2024-11-19 | End: 2024-11-19 | Stop reason: HOSPADM

## 2024-11-19 RX ORDER — ONDANSETRON 2 MG/ML
4 INJECTION INTRAMUSCULAR; INTRAVENOUS EVERY 6 HOURS PRN
Status: DISCONTINUED | OUTPATIENT
Start: 2024-11-19 | End: 2024-11-19

## 2024-11-19 RX ORDER — EPHEDRINE SULFATE 50 MG/ML
INJECTION INTRAVENOUS AS NEEDED
Status: DISCONTINUED | OUTPATIENT
Start: 2024-11-19 | End: 2024-11-19 | Stop reason: SURG

## 2024-11-19 RX ORDER — HYDROCODONE BITARTRATE AND ACETAMINOPHEN 5; 325 MG/1; MG/1
2 TABLET ORAL ONCE AS NEEDED
Status: DISCONTINUED | OUTPATIENT
Start: 2024-11-19 | End: 2024-11-19

## 2024-11-19 RX ORDER — ONDANSETRON 4 MG/1
4 TABLET, FILM COATED ORAL EVERY 8 HOURS PRN
Qty: 10 TABLET | Refills: 0 | Status: SHIPPED | OUTPATIENT
Start: 2024-11-19

## 2024-11-19 RX ORDER — PROCHLORPERAZINE EDISYLATE 5 MG/ML
5 INJECTION INTRAMUSCULAR; INTRAVENOUS EVERY 8 HOURS PRN
Status: DISCONTINUED | OUTPATIENT
Start: 2024-11-19 | End: 2024-11-19

## 2024-11-19 RX ORDER — NALOXONE HYDROCHLORIDE 0.4 MG/ML
0.08 INJECTION, SOLUTION INTRAMUSCULAR; INTRAVENOUS; SUBCUTANEOUS AS NEEDED
Status: DISCONTINUED | OUTPATIENT
Start: 2024-11-19 | End: 2024-11-19

## 2024-11-19 RX ADMIN — GLYCOPYRROLATE 0.6 MG: 0.2 INJECTION, SOLUTION INTRAMUSCULAR; INTRAVENOUS at 09:28:00

## 2024-11-19 RX ADMIN — LIDOCAINE HYDROCHLORIDE 50 MG: 10 INJECTION, SOLUTION EPIDURAL; INFILTRATION; INTRACAUDAL; PERINEURAL at 07:39:00

## 2024-11-19 RX ADMIN — ROCURONIUM BROMIDE 50 MG: 10 INJECTION, SOLUTION INTRAVENOUS at 07:40:00

## 2024-11-19 RX ADMIN — EPHEDRINE SULFATE 5 MG: 50 INJECTION INTRAVENOUS at 08:29:00

## 2024-11-19 RX ADMIN — LIDOCAINE HYDROCHLORIDE 4 ML: 40 SOLUTION TOPICAL at 07:41:00

## 2024-11-19 RX ADMIN — ONDANSETRON 4 MG: 2 INJECTION INTRAMUSCULAR; INTRAVENOUS at 08:53:00

## 2024-11-19 RX ADMIN — PHENYLEPHRINE HCL 50 MCG: 10 MG/ML VIAL (ML) INJECTION at 08:49:00

## 2024-11-19 RX ADMIN — EPHEDRINE SULFATE 5 MG: 50 INJECTION INTRAVENOUS at 08:36:00

## 2024-11-19 RX ADMIN — SODIUM CHLORIDE, SODIUM LACTATE, POTASSIUM CHLORIDE, CALCIUM CHLORIDE: 600; 310; 30; 20 INJECTION, SOLUTION INTRAVENOUS at 07:35:00

## 2024-11-19 RX ADMIN — NEOSTIGMINE METHYLSULFATE 4 MG: 1 INJECTION INTRAVENOUS at 09:28:00

## 2024-11-19 RX ADMIN — DEXAMETHASONE SODIUM PHOSPHATE 8 MG: 4 MG/ML VIAL (ML) INJECTION at 07:50:00

## 2024-11-19 NOTE — ANESTHESIA PROCEDURE NOTES
Airway  Date/Time: 11/19/2024 7:41 AM  Urgency: elective      General Information and Staff    Patient location during procedure: OR  Anesthesiologist: John Wallace MD  Resident/CRNA: Javier Delgadillo CRNA  Performed: CRNA   Performed by: Javier Delgadillo CRNA  Authorized by: John Wallace MD      Indications and Patient Condition  Indications for airway management: anesthesia  Spontaneous Ventilation: absent  Sedation level: deep  Preoxygenated: yes  Patient position: sniffing  MILS maintained throughout  Mask difficulty assessment: 1 - vent by mask  Planned trial extubation    Final Airway Details  Final airway type: endotracheal airway      Successful airway: ETT  Cuffed: yes   Successful intubation technique: direct laryngoscopy  Facilitating devices/methods: intubating stylet  Endotracheal tube insertion site: oral  Blade: Echo  Blade size: #3  ETT size (mm): 7.0    Cormack-Lehane Classification: grade I - full view of glottis  Placement verified by: capnometry   Cuff volume (mL): 6  Measured from: lips  ETT to lips (cm): 22  Number of attempts at approach: 1  Number of other approaches attempted: 0    Additional Comments  Dentition per pre op

## 2024-11-19 NOTE — DISCHARGE INSTRUCTIONS
Plastic Surgery Home Care Instructions      We hope you were pleased with your care at [facility].  We wish you the best outcome and overall experience with your operation.  These instructions will help to minimize pain, optimize healing, and improve the likelihood of a successful result.    What To Expect  There may be some spotting of the incision lines for the next few days.  Mild bruising, mild swelling and discomfort in the surgical area is typical.     Bandages (Dressing)  Wear head dressing for 2 days as tolerated. You can then remove the gauze wrap and yellow gauze. Apply antibiotic ointment (neosporin, bacitracin etc) daily to incision after you remove head dressing. You can wear gauze and headband as needed (particularly at night or with increased activity) after you remove the head dressing.     Bathing/Showers  You can resume showering tomorrow. Let soap and water run over the incision, don't scrub.   No baths, swimming, or hot tubs until you receive medical permission    Over-The-Counter Medication  You can take Tylenol and ibuprofen after surgery for pain relief as needed  Take as directed on the bottle    Home Medication  Resume your home medications as instructed  Do not resume herbal medications for two weeks    Diet  Resume your normal diet    Activity  No strenuous activity   Don't sleep on left ear.  You cannot return to physical exercise, sports, or gym workouts until you are allowed to participate in strenuous activity.    Driving  Do not drive, if you are taking pain medication.    Return to Work or School  You can return to work when you are not taking pain medication, if your work does not involve strenuous activity.  Contact the office, if you need a medical note.     Follow-up Appointment   Our office will call you to set up a time    Questions or Concerns  Call Dr. Anne's office if you experience bleeding that is not controlled by holding pressure for 20 minutes.     Noble Chao  you for coming to [facility] for your operation.  The nurses and the anesthesiologist try very hard to make sure you receive the best care possible.  Your trust in them is greatly appreciated.    Thanks so much,  Dr. Dayana Middleton PA-C

## 2024-11-19 NOTE — ANESTHESIA POSTPROCEDURE EVALUATION
[facility]    Noble Arizmendi Patient Status:  Hospital Outpatient Surgery   Age/Gender 59 year old male MRN [id number]   Location [facility] POST ANESTHESIA CARE UNIT Attending Adele Anne MD   Hosp Day # 0 PCP CHELSEA MACIAS DO       Anesthesia Post-op Note    Left ear reconstruction with local tissue rearrangement    Procedure Summary       Date: 11/19/24 Room / Location:  MAIN OR 06 /  MAIN OR    Anesthesia Start: 0734 Anesthesia Stop: 0940    Procedure: Left ear reconstruction with local tissue rearrangement (Left: Ear) Diagnosis:       Basal cell carcinoma (BCC) of left ear      (Basal cell carcinoma (BCC) of left ear [C44.219])    Surgeons: Adele Anne MD Anesthesiologist: John Wallace MD    Anesthesia Type: general ASA Status: 2            Anesthesia Type: general    Vitals Value Taken Time   /87 11/19/24 0940   Temp 97.5 11/19/24 0940   Pulse 69 11/19/24 0940   Resp 11 11/19/24 0940   SpO2 98 11/19/24 0940       Patient Location: PACU    Anesthesia Type: general    Airway Patency: patent and extubated    Postop Pain Control: adequate    Mental Status: preanesthetic baseline    Nausea/Vomiting: none    Cardiopulmonary/Hydration status: stable euvolemic    Complications: no apparent anesthesia related complications    Postop vital signs: stable    Dental Exam: Unchanged from Preop    Patient to be discharged from PACU when criteria met.

## 2024-11-19 NOTE — ANESTHESIA PREPROCEDURE EVALUATION
PRE-OP EVALUATION    Patient Name: Noble Arizmendi    Admit Diagnosis: Basal cell carcinoma (BCC) of left ear [C44.219]    Pre-op Diagnosis: Basal cell carcinoma (BCC) of left ear [C44.219]    Left ear reconstruction with local tissue rearrangement, possible Integra, possible grafts, possible biodegradable temporizing matrix - Left    Anesthesia Procedure: Left ear reconstruction with local tissue rearrangement, possible Integra, possible grafts, possible biodegradable temporizing matrix - Left (Left: Ear)    Surgeons and Role:     * Adele Anne MD - Primary    Pre-op vitals reviewed.  Temp: 96.9 °F (36.1 °C)  Pulse: 70  Resp: 16  BP: 125/88  SpO2: 97 %  Body mass index is 22.73 kg/m².    Current medications reviewed.  Hospital Medications:   acetaminophen (Tylenol Extra Strength) tab 1,000 mg  1,000 mg Oral Once    scopolamine (Transderm-Scop) 1 MG/3DAYS patch 1 patch  1 patch Transdermal Once    lactated ringers infusion   Intravenous Continuous    ceFAZolin (Ancef) 2g in 10mL IV syringe premix  2 g Intravenous Once    [COMPLETED] sertraline (Zoloft) tab 50 mg  50 mg Oral Once       Outpatient Medications:   Prescriptions Prior to Admission[1]    Allergies: Patient has no known allergies.      Anesthesia Evaluation    Patient summary reviewed.    Anesthetic Complications  (-) history of anesthetic complications         GI/Hepatic/Renal    Negative GI/hepatic/renal ROS.                             Cardiovascular    Negative cardiovascular ROS.    Exercise tolerance: good     MET: >4                                           Endo/Other    Negative endo/other ROS.                              Pulmonary    Negative pulmonary ROS.                       Neuro/Psych      (+) depression  (+) anxiety                      Patient Active Problem List:     Penetrating abdominal trauma, initial encounter     Arm laceration, left, initial encounter     Major depressive disorder, recurrent severe without psychotic features  (HCC)     MICK (generalized anxiety disorder)     Insomnia     MDD (major depressive disorder)     Basal cell carcinoma (BCC) of left ear            Past Surgical History:   Procedure Laterality Date    Arthroscopy of joint unlisted      left knee scope    Colonoscopy N/A 01/29/2016    Procedure: COLONOSCOPY;  Surgeon: Belem Tobias MD;  Location:  ENDOSCOPY    Other surgical history  08/2024    colectomy    Other surgical history Left 08/2024    wrist     Social History     Socioeconomic History    Marital status: Single   Tobacco Use    Smoking status: Never    Smokeless tobacco: Never   Vaping Use    Vaping status: Never Used   Substance and Sexual Activity    Alcohol use: Not Currently     Comment: \"a few\"    Drug use: Never     History   Drug Use Unknown     Available pre-op labs reviewed.  Lab Results   Component Value Date    WBC 6.8 10/28/2024    RBC 4.37 10/28/2024    HGB 13.4 10/28/2024    HCT 39.6 10/28/2024    MCV 90.6 10/28/2024    MCH 30.7 10/28/2024    MCHC 33.8 10/28/2024    RDW 12.6 10/28/2024    .0 10/28/2024     Lab Results   Component Value Date     10/28/2024    K 4.2 10/28/2024     10/28/2024    CO2 28.0 10/28/2024    BUN 13 10/28/2024    CREATSERUM 0.87 10/28/2024     (H) 10/28/2024    CA 10.1 10/28/2024            Airway      Mallampati: II  Mouth opening: >3 FB  TM distance: > 6 cm  Neck ROM: full Cardiovascular    Cardiovascular exam normal.         Dental    Dentition appears grossly intact         Pulmonary    Pulmonary exam normal.                 Other findings              ASA: 2   Plan: general  NPO status verified and patient meets guidelines.        Comment:  I explained intrinsic risks of general anesthesia, including nausea, dental damage, sore throat, mouth injury,and hoarseness from airway management.  All questions were answered and understanding was demonstrated of risks.  Informed permission was obtained to proceed as documented in the signed  consent form.        Plan/risks discussed with: patient                Present on Admission:  **None**             [1]   Medications Prior to Admission   Medication Sig Dispense Refill Last Dose/Taking    LORazepam 2 MG Oral Tab Take 1 tablet (2 mg total) by mouth nightly. 30 tablet 0 11/18/2024    LORazepam 0.5 MG Oral Tab Take 1 tablet (0.5 mg total) by mouth nightly as needed for Anxiety. 30 tablet 0 11/18/2024    sertraline (ZOLOFT) 100 MG Oral Tab Take 1.5 tablets (150 mg total) by mouth every morning. 45 tablet 0 11/19/2024 Morning    QUEtiapine 200 MG Oral Tab Take 1 tablet (200 mg total) by mouth nightly. 30 tablet 0 11/18/2024    QUEtiapine 200 MG Oral Tab Take 1 tablet (200 mg total) by mouth nightly. 30 tablet 0 11/18/2024    Multiple Vitamin (MULTI VITAMIN MENS) Oral Tab Take 1 tablet by mouth daily.   11/18/2024

## 2024-11-19 NOTE — BRIEF OP NOTE
Pre-Operative Diagnosis: Basal cell carcinoma (BCC) of left ear [C44.219]     Post-Operative Diagnosis: Basal cell carcinoma (BCC) of left ear [C44.219]      Procedure Performed:   Left ear reconstruction with local tissue rearrangement    Surgeons and Role:     * Adele Anne MD - Primary    Assistant(s):  PA: Maria E Middleton PA     Surgical Findings: see dictation     Specimen: none     Estimated Blood Loss: Blood Output: 2 mL (11/19/2024  9:33 AM)      PHILLY Flores  11/19/2024  9:48 AM

## 2024-11-20 NOTE — OPERATIVE REPORT
[place]    PATIENT'S NAME: PRAKASH WINN   ATTENDING PHYSICIAN: Adele Anne MD   OPERATING PHYSICIAN: Adele Anne MD   PATIENT ACCOUNT#:   [id number]    LOCATION:  [facility] 10  MEDICAL RECORD #:   [id number]       YOB: 1965  ADMISSION DATE:       11/19/2024      OPERATION DATE:  11/19/2024    OPERATIVE REPORT    PREOPERATIVE DIAGNOSIS:  Open wound left ear after Mohs excision for basal cell cancer.  POSTOPERATIVE DIAGNOSIS:  Open wound left ear after Mohs excision for basal cell cancer.  PROCEDURE:  Left ear reconstruction with local Antia-Buch flap 5 sq cm and ear cartilage graft.    ASSISTANT:  Maria E Middleton PA-C    ANESTHESIA:  General endotracheal anesthesia.    COMPLICATIONS:  None.    BLOOD LOSS:  Minimal.    INDICATIONS:  This is a 59-year-old gentleman with basal cell cancer on his left ear, for which he underwent Mohs resection.  He now presents for reconstruction.  He was seen in the office preoperatively to discuss reconstructive options including local flap and graft.  Potential risks, complications, benefits, and alternatives were discussed.  Risks and complications including, but not limited to, infection, bleeding, scarring, damage to surrounding structures, hematoma, seroma, ear deformity, graft failure, flap failure, need for further surgery.  The patient understands and wishes to proceed.  Questions were answered.    OPERATIVE TECHNIQUE:  The patient was identified in the preoperative area, informed consent was obtained, the sites were marked.  The patient was then brought back to the operating room, placed in supine position.  He was adequately padded, and sequential compression devices were applied.  General anesthesia was administered.  Perioperative antibiotics were given.  Then, his face and ears were prepped and draped in usual sterile fashion.  Then, the defect was thoroughly irrigated.  The open wound on the left ear was evaluated,  and the helical rim cartilage was intact with some perichondrium on parts and other parts without perichondrium.  The left ear with the defect was measuring larger in size compared to the right ear, and therefore, the decision was made to perform an Antia-Buch local tissue rearrangement including cartilage graft with overlap to restore form and function of the ear and accept a small decrease in size as that is the larger ear.  This was done by injecting local anesthesia 1% lidocaine with epinephrine surrounding the incision, and then a 15 blade was used to make a helical rim incision superiorly and inferiorly, and then a postauricular flap dissection was performed.  Then, curved iris scissors were used to excise part of the scapha cartilage in a small crescent shape.  This allowed for the 2 flaps to come together, and this cartilage was then used for reinforcement of the helical rim.  The area without perichondrium that had slight dusky appearance was excised from the helical rim and then an overlap reinforcement of the helical rim cartilage and the cartilage graft was performed and secured with 5-0 PDS sutures.  Then, the local flaps were advanced into the defect and inset and further closed on the helical crease with 5-0 chromic sutures.  The dog ear on the posterior aspect was excised and closed with 5-0 Prolene sutures.  Then, the incisions were covered with Bacitracin ointment and Xeroform, and a head dressing was applied.  The patient tolerated the procedure well and awoke from anesthesia without difficulty.  All sponge and needle counts were correct at the end of the case.    (Also Job [id number])      Dictated By Adele Anne MD  d: 11/19/2024 17:52:03  t: 11/20/2024 08:50:01  Job [id number]  [facility]/

## 2024-11-26 ENCOUNTER — NURSE ONLY (OUTPATIENT)
Dept: SURGERY | Facility: CLINIC | Age: 59
End: 2024-11-26
Payer: COMMERCIAL

## 2024-11-26 DIAGNOSIS — C44.219 BASAL CELL CARCINOMA (BCC) OF LEFT EAR: Primary | ICD-10-CM

## 2024-11-26 PROCEDURE — 99024 POSTOP FOLLOW-UP VISIT: CPT | Performed by: SURGERY

## 2024-11-26 NOTE — PROGRESS NOTES
Noble Arizmendi is a 59 year old male who presents today for a follow-up after left ear reconstruction with local Antia-Bush flap, 5 s q cm, and ear cartilage graft with Dr. Anne on 11/19/2024.  His reconstruction was following a MOHs excision of basal cell carcinoma, nodular pattern, of the left superior helix on 11/14/2024.    He denies fever and chills. He denies nausea, vomiting, diarrhea or constipation.   His pain is controlled.  He is here today for wound check and suture removal.     Physical Exam     HEENT: Left ear incisions clean, dry, and intact. No wound drainage or wound dehiscence. Mild erythema and edema to the superior portion of the ear. No ecchymosis or necrosis. Prolene sutures in place. Flap viable.     There were no vitals filed for this visit.      Assessment and Plan     Noble Arizmendi is doing well s/p left ear reconstruction with local Antia-Bush flap, 5 s q cm, and ear cartilage graft with Dr. Anne on 11/19/2024.    The prolene sutures were removed today. The patient tolerated this well. Flap viable. Neosporin was applied over the left ear. He will continue to apply this daily.     We discussed continued activity restrictions. He will continue to avoid sleeping on his left side/left ear until he is cleared by Dr. Anne.     He will follow up on 12/11 with Dr. Anne. He was encouraged to contact our office with any questions or concerns.     Questions were answered. Patient understands.     Mela RAMOS RN  11/26/2024  10:28 AM

## 2024-12-04 ENCOUNTER — TELEPHONE (OUTPATIENT)
Dept: PHYSICAL THERAPY | Facility: HOSPITAL | Age: 59
End: 2024-12-04

## 2024-12-10 NOTE — PROGRESS NOTES
Noble Arizmendi is a 59 year old male who presents today for a follow-up after left ear reconstruction with local Antia-Bush flap, 5 s q cm, and ear cartilage graft on 11/19/2024.  His reconstruction was following a MOHs excision of basal cell carcinoma, nodular pattern, of the left superior helix on 11/14/2024.    He denies fever and chills. He denies nausea, vomiting, diarrhea or constipation.   His pain is controlled. He is here today for wound re-check. He has no concerns.    Physical Exam     HEENT: Left ear incision clean, dry, and intact. Flap viable. No evidence of wound dehiscence or drainage. No erythema or ecchymosis.    There were no vitals filed for this visit.      Assessment and Plan   This pt was seen in person by my team and via virtual video visit by me    Noble Arizmendi is doing well s/p left ear reconstruction with local Antia-Bush flap, 5 s q cm, and ear cartilage graft on 11/19/2024.     His incision is healing appropriately with his left ear flab viable. We discussed that he may start scar management and scar massage. He may sleep on his left side, but he should wear a headband to protect it. We discussed applying sun block over his left ear during the day.      He will follow up with me in about 6 months. He was encouraged to contact our office with any questions or concerns.     Questions were answered. Patient understands.

## 2024-12-11 ENCOUNTER — OFFICE VISIT (OUTPATIENT)
Dept: SURGERY | Facility: CLINIC | Age: 59
End: 2024-12-11
Payer: COMMERCIAL

## 2024-12-11 ENCOUNTER — OFFICE VISIT (OUTPATIENT)
Dept: PHYSICAL THERAPY | Facility: HOSPITAL | Age: 59
End: 2024-12-11
Attending: PHYSICAL MEDICINE & REHABILITATION
Payer: COMMERCIAL

## 2024-12-11 DIAGNOSIS — C44.219 BASAL CELL CARCINOMA (BCC) OF LEFT EAR: Primary | ICD-10-CM

## 2024-12-11 DIAGNOSIS — M21.372 LEFT FOOT DROP: Primary | ICD-10-CM

## 2024-12-11 PROCEDURE — 97110 THERAPEUTIC EXERCISES: CPT

## 2024-12-11 PROCEDURE — 99024 POSTOP FOLLOW-UP VISIT: CPT | Performed by: SURGERY

## 2024-12-11 PROCEDURE — 97161 PT EVAL LOW COMPLEX 20 MIN: CPT

## 2024-12-11 NOTE — PROGRESS NOTES
LOWER EXTREMITY EVALUATION:     Diagnosis:   Left foot drop (M21.372)       Referring Provider: Fredy Byrnes  Date of Evaluation:    12/11/2024    Precautions:  Cancer Next MD visit:   none scheduled  Date of Surgery: n/a     PATIENT SUMMARY   Noble Arizmendi is a 59 year old male who presents to therapy today with complaints of pain/numbness/tingling down the right arm. Also reports of bilateral leg numbness/tingling, mostly concentrated at the feet. Symptoms began this past summer  Went to PCP who then referred to spine doctor.   Went to physical therapy at [facility] for 3-4 sessions. Had scheduled for MRI of the C-spine, wasn't able to complete due to going to facility. EMG of the upper arm also unable to be completed.  Began having mental health concerns and went to inpatient facility. Was on different medications. While he was in there, began developing left foot drop.  Received EMG of left leg, and differences came up around the ant. Tib.  Biggest concern at this time is the numbness in bilateral feet  Pt describes pain level current 5/10, at best 4/10, at worst 6/10.   Pain location: dorsum of bilateral feet  Pain description: numbness  Current functional limitations include running .     Irritability: low  24 hour pattern: lessened in the morning, may increase based on activity levels   Aggravating factors: N/A  Relieving factors: N/A      Noble describes prior level of function fairly active with biking.   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  Past medical history was reviewed with Noble. Significant findings include   Past Medical History:    Anxiety    Back pain    Cancer (HCC)    BASAL CELL CA LEFT EAR    Depression    Hematuria    Nerve damage         ASSESSMENT  Noble presents to physical therapy evaluation with primary c/o left foot/ankle weakness and bilateral numbness in the feet. The  results of the objective tests and measures show diminished light touch sensation, weakness in bilateral ankles.  Functional deficits include but are not limited to running.  Signs and symptoms are consistent with diagnosis of foot drop. Pt and PT discussed evaluation findings, pathology, POC and HEP.  Pt voiced understanding and performs HEP correctly without reported pain. Skilled Physical Therapy is medically necessary to address the above impairments and reach functional goals.     OBJECTIVE:   Observation: decreased DF with ambulation on L ankle  Palpation: no tenderness with palpation of lower extremity  Sensation: light touch sensation diminished bilateral at the distal calf into the dorsum of the feet    BP: 135/89  AROM: (* denotes performed with pain)  Hip Knee Foot/Ankle   Not formally tested Flexion: R WFL; L WFL  Extension: R WFL; L WFL    DF: R 15; L 9  PF: R 30; L 30  INV: R 32; L 27  EV: R 20; L 20  Great toe ext: R 20; L 14         Flexibility:  Hip Flexor: R NL, L NL  Hamstrings: R NL; L NL  Piriformis: R NL; L NL  Quads: R NL; L NL  Gastroc-soleus: R mod impairment; L mod impairment    Strength/MMT: (* denotes performed with pain)  Hip Knee Foot/Ankle   Flexion: R 4+/5; L 4+/5  Extension: R 4+/5; L 4+/5  Abduction: R 3+/5; L 3+/5  ER: R 4+/5; L 4+/5  IR: R 4+/5; L 4+/5 Flexion: R 4+/5; L 4+/5  Extension: R 4+/5; L 4+/5    DF: R 3-/5; L 3-/5  PF: R 3+/5; L 3+/5  INV: R 3+/5; L 3+/5  EV: R 3+/5; L 3+/5  Great toe ext: R 3-/5; L 3-/5       Gait: pt ambulates on level ground with drop foot  Balance: SLS: R 12 sec, L 8 sec    Today’s Treatment and Response:   Pt education was provided on exam findings, treatment diagnosis, treatment plan, expectations, and prognosis. Pt was also provided recommendations for activity modifications, possible soreness after evaluation, importance of remaining active, and shoe wear.  Patient was instructed in and issued a HEP for: Access Code: [email or web address]  URL:  [email or web address]  Date: 12/11/2024  Prepared by: Porfirio Valenzuela    Exercises  - Seated Ankle Plantarflexion with Resistance  - 2 x daily - 7 x weekly - 2 sets - 10 reps  - Seated Ankle Dorsiflexion with Anchored Resistance  - 2 x daily - 7 x weekly - 2 sets - 10 reps  - Seated Ankle Inversion with Anchored Resistance  - 2 x daily - 7 x weekly - 2 sets - 10 reps  - Seated Ankle Eversion with Anchored Resistance  - 2 x daily - 7 x weekly - 2 sets - 10 reps  - Seated Ankle Pumps  - 2 x daily - 7 x weekly - 2 sets - 10 reps    Charges: PT Eval Low Complexity, 1 therex      Total Timed Treatment: 10 min     Total Treatment Time: 45 min     Based on clinical rationale and outcome measures, this evaluation involved Low Complexity decision making due to 1-2 personal factors/comorbidities, 3 body structures involved/activity limitations, and evolving symptoms including  changing in strength .  PLAN OF CARE:    Goals: (to be met in 8 visits)  Pt will demonstrate improved DF AROM to >= 10 degrees to promote proper foot clearance during gait and greater ease descending stairs without compensation  Pt will have increased ankle strength to 5/5 throughout for improved ankle control with ADLs such as prolonged gait and stair negotiation (  Pt will have improved SLS to >25s for increased ankle stability with ambulation on uneven surfaces such as gravel and grass   Pt will be independent and compliant with comprehensive HEP to maintain progress achieved in PT      Frequency / Duration: Patient will be seen for 1-2 x/week or a total of 8 visits over a 90 day period. Treatment will include: Gait training, Manual Therapy, Neuromuscular Re-education, Therapeutic Activities, Therapeutic Exercise, and Home Exercise Program instruction    Education or treatment limitation: None  Rehab Potential:good    LEFS Score  LEFS Score: (Patient-Rptd) 78.75 % (12/6/2024  3:05 PM)      Patient/Family/Caregiver was advised of  these findings, precautions, and treatment options and has agreed to actively participate in planning and for this course of care.    Thank you for your referral. Please co-sign or sign and return this letter via fax as soon as possible to [phone]. If you have any questions, please contact me at Dept: [phone]    Sincerely,  Electronically signed by therapist: Porfirio Valenzuela PT  Physician's certification required: Yes  I certify the need for these services furnished under this plan of treatment and while under my care.    X___________________________________________________ Date____________________    Certification From: 12/11/2024  To:3/11/2025

## 2024-12-13 DIAGNOSIS — C44.219 BASAL CELL CARCINOMA (BCC) OF LEFT EAR: Primary | ICD-10-CM

## 2024-12-18 ENCOUNTER — OFFICE VISIT (OUTPATIENT)
Dept: PHYSICAL THERAPY | Facility: HOSPITAL | Age: 59
End: 2024-12-18
Attending: PHYSICAL MEDICINE & REHABILITATION
Payer: COMMERCIAL

## 2024-12-18 PROCEDURE — 97140 MANUAL THERAPY 1/> REGIONS: CPT

## 2024-12-18 PROCEDURE — 97110 THERAPEUTIC EXERCISES: CPT

## 2024-12-18 NOTE — PROGRESS NOTES
Diagnosis:   Left foot drop (M21.372)       Referring Provider: Fredy Byrnes  Date of Evaluation:    12/11/2024    Precautions:  Cancer Next MD visit:   none scheduled  Date of Surgery: n/a   Insurance Primary/Secondary: [employer] / N/A     # Auth Visits: POC 8 visits            Subjective: I had some difficulty finding a way to get the resistance right for th DF.  The green band feels too easy  The numbness is able the same     Pain: 0/10, not being treated for pain       Objective:   Ankle/Foot  DF: R 15; L 9  PF: R 30; L 30  INV: R 32; L 27  EV: R 20; L 20  Great toe ext: R 20; L 14      Assessment: pt tolerated session well.  Pt continues to demonstrated decreased left ankle motion and strength compared to right side. Pt tolerated progression of strengthening and stability exercises well. Added additional hip and quad strengthening. Pt's HEP progressed      Goals: (to be met in 8 visits)  Pt will demonstrate improved DF AROM to >= 10 degrees to promote proper foot clearance during gait and greater ease descending stairs without compensation  Pt will have increased ankle strength to 5/5 throughout for improved ankle control with ADLs such as prolonged gait and stair negotiation (  Pt will have improved SLS to >25s for increased ankle stability with ambulation on uneven surfaces such as gravel and grass   Pt will be independent and compliant with comprehensive HEP to maintain progress achieved in PT       Plan: continue with ankle strengthening and stability  Date: 12/18/2024  TX#: 2/8 Date:                 TX#: 3/ Date:                 TX#: 4/ Date:                 TX#: 5/ Date:   Tx#: 6/   TherEx  4-way ankle strengthening BTB 2 x 15  DL heel raise x 20  Side stepping in // bars BTB 2 laps  SL heel raise x 15 ea leg  Fwd step ups onto bosu 2 x 10  Lat step ups onto bosu 2 x 10   Fwd lunge onto bosu x 10 ea leg  Lunge x 10 ea leg       Manual therapy  Posterior TC glide for DF grade 3  Manual stretching of  gastroc/soleus complex                     HEP: Access Code: [email or web address]  URL: [email or web address]  Date: 12/18/2024  Prepared by: Porfirio Valenzuela    Exercises  - Seated Ankle Plantarflexion with Resistance  - 2 x daily - 7 x weekly - 2 sets - 10 reps  - Seated Ankle Dorsiflexion with Anchored Resistance  - 2 x daily - 7 x weekly - 2 sets - 10 reps  - Seated Ankle Inversion with Anchored Resistance  - 2 x daily - 7 x weekly - 2 sets - 10 reps  - Seated Ankle Eversion with Anchored Resistance  - 2 x daily - 7 x weekly - 2 sets - 10 reps  - Seated Ankle Pumps  - 2 x daily - 7 x weekly - 2 sets - 10 reps  - Heel Raises with Counter Support  - 2 x daily - 7 x weekly - 2 sets - 20 reps  - Single Leg Heel Raise with Counter Support  - 2 x daily - 7 x weekly - 2 sets - 15 reps  - Side Stepping with Resistance at Ankles and Counter Support  - 2 x daily - 7 x weekly - 3 sets  - Squat with Counter Support  - 2 x daily - 7 x weekly - 2 sets - 10 reps  - Lunge with Counter Support  - 2 x daily - 7 x weekly - 2 sets - 10 reps    Charges: 1 MT 2 therex       Total Timed Treatment: 45 min  Total Treatment Time: 45 min

## 2024-12-23 ENCOUNTER — APPOINTMENT (OUTPATIENT)
Dept: PHYSICAL THERAPY | Facility: HOSPITAL | Age: 59
End: 2024-12-23
Attending: PHYSICAL MEDICINE & REHABILITATION
Payer: COMMERCIAL

## 2024-12-30 ENCOUNTER — APPOINTMENT (OUTPATIENT)
Dept: PHYSICAL THERAPY | Facility: HOSPITAL | Age: 59
End: 2024-12-30
Attending: PHYSICAL MEDICINE & REHABILITATION
Payer: COMMERCIAL

## 2025-01-08 ENCOUNTER — OFFICE VISIT (OUTPATIENT)
Dept: PHYSICAL THERAPY | Facility: HOSPITAL | Age: 60
End: 2025-01-08
Attending: PHYSICAL MEDICINE & REHABILITATION
Payer: COMMERCIAL

## 2025-01-08 PROCEDURE — 97110 THERAPEUTIC EXERCISES: CPT

## 2025-01-08 PROCEDURE — 97140 MANUAL THERAPY 1/> REGIONS: CPT

## 2025-01-08 NOTE — PROGRESS NOTES
Diagnosis:   Left foot drop (M21.372)       Referring Provider: Fredy Byrnes  Date of Evaluation:    12/11/2024    Precautions:  Cancer Next MD visit:   none scheduled  Date of Surgery: n/a   Insurance Primary/Secondary: [place] / N/A     # Auth Visits: POC 8 visits            Subjective: I got a little bit lazy over the holidays. I have gotten back into it earlier last week.  I feel like things have gotten a bit better on their own. I have noticed it will walking.     Pain: 0/10, not being treated for pain       Objective:   Ankle/Foot  DF: R 16; L 12  PF: R 30; L 30  INV: R 32; L 30  EV: R 20; L 20  Great toe ext: R 20; L 14    SLS:  R- 30 sec   L-30 sec       Assessment: pt tolerated session well.  Pt demonstrated improvement of left ankle range of motion. Progressed strengthening and well tolerated. Pt demonstrated decreased range during SL heel raise on L. Completed with lower weight on shuttle and pt demonstrated improvement and felt as though he was working the muscle. Added calf stretch for home.       Goals: (to be met in 8 visits)  Pt will demonstrate improved DF AROM to >= 10 degrees to promote proper foot clearance during gait and greater ease descending stairs without compensation  Pt will have increased ankle strength to 5/5 throughout for improved ankle control with ADLs such as prolonged gait and stair negotiation (  Pt will have improved SLS to >25s for increased ankle stability with ambulation on uneven surfaces such as gravel and grass   Pt will be independent and compliant with comprehensive HEP to maintain progress achieved in PT       Plan: continue with ankle strengthening and stability  Date: 12/18/2024  TX#: 2/8 Date:1/8/2025                 TX#: 3/8 Date:                 TX#: 4/ Date:                 TX#: 5/ Date:   Tx#: 6/   TherEx  4-way ankle strengthening BTB 2 x 15  DL heel raise x 20  Side stepping in // bars BTB 2 laps  SL heel raise x 15 ea leg  Fwd step ups onto bosu 2 x  10  Lat step ups onto bosu 2 x 10   Fwd lunge onto bosu x 10 ea leg  Lunge x 10 ea leg TherEx  4-way ankle strengthening BTB 2 x 15 on L  DL heel raise x 20  SL heel raise 2 x 15 ea leg  Squats with TRX 2 x 12  SL heel raise on shuttle 2 x 20 (12#)  Standing gastroc stretch 10 x 5-10 sec holds   Fwd step ups onto bosu 2 x 10 LLE  Lat step ups onto bosu 2 x 10 LLE       Manual therapy  Posterior TC glide for DF grade 3  Manual stretching of gastroc/soleus complex Manual therapy  Posterior TC glide for DF grade 3  Manual stretching of gastroc/soleus complex       Neuro Re-ed  Tandem stance on airex 2 x 30 sec holds ea leg  SLS 2 x as long as possible ea leg               HEP: Access Code: [email or web address]  URL: [email or web address]  Date: 01/08/2025  Prepared by: Porfirio Valenzuela    Exercises  - Seated Ankle Plantarflexion with Resistance  - 2 x daily - 7 x weekly - 2 sets - 10 reps  - Seated Ankle Dorsiflexion with Anchored Resistance  - 2 x daily - 7 x weekly - 2 sets - 10 reps  - Seated Ankle Inversion with Anchored Resistance  - 2 x daily - 7 x weekly - 2 sets - 10 reps  - Seated Ankle Eversion with Anchored Resistance  - 2 x daily - 7 x weekly - 2 sets - 10 reps  - Seated Ankle Pumps  - 2 x daily - 7 x weekly - 2 sets - 10 reps  - Heel Raises with Counter Support  - 2 x daily - 7 x weekly - 2 sets - 20 reps  - Single Leg Heel Raise with Counter Support  - 2 x daily - 7 x weekly - 2 sets - 15 reps  - Side Stepping with Resistance at Ankles and Counter Support  - 2 x daily - 7 x weekly - 3 sets  - Squat with Counter Support  - 2 x daily - 7 x weekly - 2 sets - 10 reps  - Lunge with Counter Support  - 2 x daily - 7 x weekly - 2 sets - 10 reps  - Gastroc Stretch with Foot at Wall  - 2 x daily - 7 x weekly - 1 sets - 10 reps - 10 hold  - Gastroc Stretch on Wall  - 2 x daily - 7 x weekly - 1 sets - 10 reps - 10 hold    Charges: 1 MT 2 therex       Total Timed Treatment: 45 min  Total Treatment Time: 45  min

## 2025-01-14 ENCOUNTER — MED REC SCAN ONLY (OUTPATIENT)
Dept: FAMILY MEDICINE CLINIC | Facility: CLINIC | Age: 60
End: 2025-01-14

## 2025-01-15 ENCOUNTER — APPOINTMENT (OUTPATIENT)
Dept: PHYSICAL THERAPY | Facility: HOSPITAL | Age: 60
End: 2025-01-15
Attending: PHYSICAL MEDICINE & REHABILITATION
Payer: COMMERCIAL

## 2025-01-17 ENCOUNTER — OFFICE VISIT (OUTPATIENT)
Dept: ORTHOPEDICS CLINIC | Facility: CLINIC | Age: 60
End: 2025-01-17
Payer: COMMERCIAL

## 2025-01-17 VITALS — BODY MASS INDEX: 22.82 KG/M2 | WEIGHT: 163 LBS | HEIGHT: 71 IN

## 2025-01-17 DIAGNOSIS — S41.112A ARM LACERATION, LEFT, INITIAL ENCOUNTER: Primary | ICD-10-CM

## 2025-01-17 PROCEDURE — 99212 OFFICE O/P EST SF 10 MIN: CPT | Performed by: ORTHOPAEDIC SURGERY

## 2025-01-17 PROCEDURE — 3008F BODY MASS INDEX DOCD: CPT | Performed by: ORTHOPAEDIC SURGERY

## 2025-01-17 NOTE — PROGRESS NOTES
Clinic Note     Assessment/Plan:  59 year old male    Status post left wrist FCR, FDS middle finger, and segmental partial median nerve laceration repair on 8/17/2024-nerve recovery is noted.  There is more of a Tinel's at the distal transverse laceration site.  Patient subjectively also reports improvement in sensation which is reassuring.  He seems to have better EPB muscle function on examination today.  A tendon transfer for thumb opposition will not be needed based on current function.    Follow Up: 6 months    Diagnostic Studies:     None       Physical Exam:     Ht 5' 11\" (1.803 m)   Wt 163 lb (73.9 kg)   BMI 22.73 kg/m²     Constitutional: NAD. AOx3. Well-developed and Well-nourished.   Psychiatric: Normal mood/ affect/ behavior. Judgment and thought content normal.     Left Upper Extremity:     Inspection    Skin incisions well-healed with improving cosmesis   Palpation    Tinel's at the distal laceration site      ROM    Normal symmetric elbow motion.    Normal wrist and finger motion.     Neurovascular    Normal sensation in ulnar, and radial nerve distribution. Normal motor function of muscles innervated by AIN, ulnar, and radial/PIN nerves.    Thumb radial 40% numbness  Thumb ulnar 40% numbness  Index finger radial 30% numbness  Index finger ulnar 30% numbness  Middle finger radial 20% numbness  Middle finger ulnar 20% numbness  Ring finger radial 10% numbness  Improved since last visit    4/5 APB function    Normally perfused hand(s).     Special    None          CC: Status post flexor tendon and median nerve repair on 8/17/2024    HPI: This 59 year old RHD male doing well postoperatively with minimal pain at this point.  He has been adherent to the splinting.  Notes some buzzing in the hand    Interval Hx (10/4/2024): Patient progressing from a therapy standpoint.  Minimal to no pain currently.  Numbness has not changed.  Notes thumb dysfunction    Interval Hx (11/15/2024): No significant  improvement in the numbness or thumb APB function.  He is able to make a full composite fist and is ready to return to work without restrictions.    Interval Hx (1/17/2025): Patient continues to note improvement in sensation and function of his hand.  At this point he notes decreasing difficulty with normal daily activities.  He is pleased with the progress and optimistic about further improvements in the future.      History/Other:   Past Medical History:    Anxiety    Back pain    Cancer (HCC)    BASAL CELL CA LEFT EAR    Depression    Hematuria    Nerve damage     Past Surgical History:   Procedure Laterality Date    Arthroscopy of joint unlisted      left knee scope    Colonoscopy N/A 01/29/2016    Procedure: COLONOSCOPY;  Surgeon: Belem Tobias MD;  Location:  ENDOSCOPY    Other surgical history  08/2024    colectomy    Other surgical history Left 08/2024    wrist     Current Outpatient Medications   Medication Sig Dispense Refill    LORazepam 2 MG Oral Tab Take 1 tablet (2 mg total) by mouth nightly. 30 tablet 1    QUEtiapine 200 MG Oral Tab Take 1 tablet (200 mg total) by mouth nightly. 30 tablet 1    sertraline (ZOLOFT) 100 MG Oral Tab Take 1.5 tablets (150 mg total) by mouth every morning. 45 tablet 1    ondansetron (ZOFRAN) 4 mg tablet Take 1 tablet (4 mg total) by mouth every 8 (eight) hours as needed for Nausea. 10 tablet 0    QUEtiapine 200 MG Oral Tab Take 1 tablet (200 mg total) by mouth nightly. 30 tablet 0    Multiple Vitamin (MULTI VITAMIN MENS) Oral Tab Take 1 tablet by mouth daily.       No Known Allergies  Family History   Problem Relation Age of Onset    Lipids Father     Hypertension Father     Cancer Father     Other (HTN) Father     Suicide History Mother     Depression Mother     Other (Other) Brother      Social History     Occupational History    Not on file   Tobacco Use    Smoking status: Never    Smokeless tobacco: Never   Vaping Use    Vaping status: Never Used   Substance and  Sexual Activity    Alcohol use: Not Currently     Comment: \"a few\"    Drug use: Never    Sexual activity: Not on file          Review of Systems (negative unless bolded):  General: fevers, chills, fatigue  CV:  chest pain, palpitations, leg swelling  Msk: bodyaches, neck pain, neck stiffness  Skin: rashes, open wounds, nonhealing ulcers  Hem: bleeds easily, bruise easily, immunocompromised  Neuro: dizziness, light headedness, headaches  Psych: anxious, depressed, anger issues      Martell Mehta MD   Hand, Wrist, & Elbow Surgery  [email or web address]  t: [phone]  f: [phone]

## 2025-01-22 ENCOUNTER — TELEPHONE (OUTPATIENT)
Dept: PHYSICAL THERAPY | Facility: HOSPITAL | Age: 60
End: 2025-01-22

## 2025-01-22 ENCOUNTER — APPOINTMENT (OUTPATIENT)
Dept: PHYSICAL THERAPY | Facility: HOSPITAL | Age: 60
End: 2025-01-22
Attending: PHYSICAL MEDICINE & REHABILITATION
Payer: COMMERCIAL

## 2025-01-29 ENCOUNTER — APPOINTMENT (OUTPATIENT)
Dept: PHYSICAL THERAPY | Facility: HOSPITAL | Age: 60
End: 2025-01-29
Attending: PHYSICAL MEDICINE & REHABILITATION
Payer: COMMERCIAL

## (undated) DEVICE — 3M™ STERI-STRIP™ REINFORCED ADHESIVE SKIN CLOSURES, R1547, 1/2 IN X 4 IN (12 MM X 100 MM), 6 STRIPS/ENVELOPE: Brand: 3M™ STERI-STRIP™

## (undated) DEVICE — ALCOHOL PREP,2 PLY, MEDIUM: Brand: WEBCOL

## (undated) DEVICE — Device

## (undated) DEVICE — CURVED, LARGE JAW, OPEN SEALER/DIVIDER NANO-COATED: Brand: LIGASURE IMPACT

## (undated) DEVICE — 450 ML BOTTLE OF 0.05% CHLORHEXIDINE GLUCONATE IN 99.95% STERILE WATER FOR IRRIGATION, USP AND APPLICATOR.: Brand: IRRISEPT ANTIMICROBIAL WOUND LAVAGE

## (undated) DEVICE — LOADING UNIT WITH DST SERIES TECHNOLOGY: Brand: GIA

## (undated) DEVICE — BANDAGE ACE COMP 2INX5YDS UNSTERILE

## (undated) DEVICE — CLIP LIG M BLU TI HRT SHP WIRE HORZ

## (undated) DEVICE — LAPAROTOMY SPONGE - RF AND X-RAY DETECTABLE PRE-WASHED: Brand: SITUATE

## (undated) DEVICE — SUT PROL SZ 5-0 36IN RB-1 NABSRB BL

## (undated) DEVICE — SUT ETHLN 4-0 18IN NABSRB BLK 19MM PS

## (undated) DEVICE — SUT PERMA- 3-0 30IN NABSRB BLK TIE SILK

## (undated) DEVICE — PTFE COATED BLADE 2.75': Brand: MEDLINE

## (undated) DEVICE — GLOVE SUR 7.5 SENSICARE PI PIP GRN PWD F

## (undated) DEVICE — OCCLUSIVE GAUZE STRIP OVERWRAP,3% BISMUTH TRIBROMOPHENATE IN PETROLATUM BLEND: Brand: XEROFORM

## (undated) DEVICE — GLOVE SUR 7 SENSICARE PI PIP GRN PWD F

## (undated) DEVICE — TOWEL,OR,DSP,ST,BLUE,DLX,2/PK,40PK/CS: Brand: MEDLINE

## (undated) DEVICE — DRESSING ADAPTIC L3XW3IN OIL ADH

## (undated) DEVICE — ZZ-DISC-SUB-421016-SUT VCRL 0 L27IN ABSRB VLT L26MM UR-6 5/8-ZZDISC-USE 421016

## (undated) DEVICE — GLOVE SUR 6.5 SENSICARE PI PIP CRM PWD F

## (undated) DEVICE — MICRO KOVER: Brand: UNBRANDED

## (undated) DEVICE — INTENDED USED TO PROTECT, TAG AND HELP LOCATED SUTURES DURING SURGERY: Brand: STERION®SUTURE AID BOOTIES

## (undated) DEVICE — PADDING CAST 3INX4YD 100% COT ABSRB HIGHLY

## (undated) DEVICE — GOWN,PREVENTION PLUS,XLARGE,STERILE: Brand: MEDLINE

## (undated) DEVICE — SYRINGE MED 10ML LL CTRL W/ FNGR GRP CLR BRL

## (undated) DEVICE — BANDAGE,GAUZE,BULKEE II,4.5"X4.1YD,STRL: Brand: MEDLINE

## (undated) DEVICE — DRAPE STERI 1000 UTIL AD

## (undated) DEVICE — SLEEVE COMPR MD KNEE LEN SGL USE KENDALL SCD

## (undated) DEVICE — PACK DRAPE HEAD/NECK STER

## (undated) DEVICE — ELECTRODE ES 2.75IN PTFE BLDE MOD E-Z CLN

## (undated) DEVICE — VIOLET BRAIDED (POLYGLACTIN 910), SYNTHETIC ABSORBABLE SUTURE: Brand: COATED VICRYL

## (undated) DEVICE — GLOVE SUR 7 SENSICARE PI PIP CRM PWD F

## (undated) DEVICE — PAD,ABDOMINAL,8"X7.5",ST,LF,20/BX: Brand: MEDLINE INDUSTRIES, INC.

## (undated) DEVICE — GOWN,SIRUS,FABRIC-REINFORCED,X-LARGE: Brand: MEDLINE

## (undated) DEVICE — SUT ETHLN 3-0 18IN PS-2 NABSRB BLK 19MM 3/8 C

## (undated) DEVICE — PROXIMATE RH ROTATING HEAD SKIN STAPLERS (35 WIDE) CONTAINS 35 STAINLESS STEEL STAPLES: Brand: PROXIMATE

## (undated) DEVICE — SUT PDS II 1 60IN TP-1 ABSRB VLT L65MM 1/2

## (undated) DEVICE — PREMIUM WET SKIN PREP TRAY: Brand: MEDLINE INDUSTRIES, INC.

## (undated) DEVICE — SPONGE 4X4 10PK

## (undated) DEVICE — ZZ-DISC-SUB-448777-SUT COAT VCRL 5-0 27IN RB-1 ABSRB UD 17MM 1/2

## (undated) DEVICE — DISPOSABLE BIPOLAR FORCEPS 4" (10.2CM) JEWELERS, STRAIGHT 0.4MM TIP AND 12 FT. (3.6M) CABLE: Brand: KIRWAN

## (undated) DEVICE — SOLUTION IRRIG 1000ML 0.9% NACL USP BTL

## (undated) DEVICE — SET TUBING DELTER CYSTO IRRIG L77IN DIA0.241IN BLDR NVENT

## (undated) DEVICE — SUTURE ETHBND XL 2-0 30IN NABSRB GRN 26MM SH 1/2 CIR

## (undated) DEVICE — SUT VCRL 0 L18IN ABSRB UD TIE POLYGLACTIN

## (undated) DEVICE — ADHESIVE SKIN TOP FOR WND CLSR DERMBND ADV

## (undated) DEVICE — PACK PBDS LAPAROTOMY GENERAL

## (undated) DEVICE — SUT PERMA- 3-0 18IN SH NABSRB BLK CR 26MM

## (undated) DEVICE — #10 STERILE BLADE: Brand: POLYMER COATED BLADES

## (undated) DEVICE — DISPOSABLE TOURNIQUET CUFF SINGLE BLADDER, DUAL PORT AND QUICK CONNECT CONNECTOR: Brand: COLOR CUFF

## (undated) DEVICE — CLIP INT L ORNG TI TRNSVRS GRV CHEVRON SHP

## (undated) DEVICE — SUT PDS II 5-0 27IN RB-1 ABSRB VLT L17MM 1/2

## (undated) DEVICE — UPPER EXTREMITY CDS-LF: Brand: MEDLINE INDUSTRIES, INC.

## (undated) DEVICE — SUT PDS II 1 36IN ABSRB VLT L36MM CT-1

## (undated) DEVICE — STAPLER WITH DST SERIES TECHNOLOGY: Brand: GIA

## (undated) DEVICE — SUT CHRM GUT 5-0 27IN RB-1 ABSRB UD 17MM 1/2

## (undated) DEVICE — DRESSING ANTIMIC 3.5 X 12 IN AQCEL AG ADVNTG

## (undated) DEVICE — SLING MEDIUM 15X8.5IN 32IN

## (undated) DEVICE — SHEET, DRAPE, SPLIT, STERILE: Brand: MEDLINE

## (undated) DEVICE — ZZ-DISC-SUB-495267-SUTURE VICRYL TIE 60IN POLY ABSORB UD BRD

## (undated) DEVICE — SOLUTION PREP 30ML 5% POVIDONE IOD EYE BDINE

## (undated) DEVICE — MARKER SKIN PREP-RESISTANT ST: Brand: MEDLINE INDUSTRIES, INC.

## (undated) DEVICE — SUT VCRL 0 18IN CT-1 ABSRB VLT CR L36MM 1/2

## (undated) DEVICE — MINI-BLADE®: Brand: BEAVER®

## (undated) DEVICE — OINTMENT SKIN 3 ANTIBIO BACI ZN NEOMYCN SULF

## (undated) DEVICE — STANDARD HYPODERMIC NEEDLE,POLYPROPYLENE HUB: Brand: MONOJECT

## (undated) DEVICE — ELECTRODE ES L10.2CM BLDE L4IN EXT MPLR OPN

## (undated) DEVICE — DRESSING ADAPTIC L16XW3IN OIL ADH

## (undated) DEVICE — SUT PERMA- 2-0 30IN NABSRB BLK TIE SILK

## (undated) DEVICE — SUT MCRYL 4-0 18IN PS-2 ABSRB UD 19MM 3/8 CIR

## (undated) DEVICE — STAPLER WITH DST SERIES TECHNOLOGY: Brand: TA

## (undated) NOTE — LETTER
[address]  [address]  Authorization for Surgical Operation and Procedure     Date:___________                                                                                                         Time:__________  I hereby authorize Surgeon(s):  Nikkie Chavez MD, my physician and his/her assistants (if applicable), which may include medical students, residents, and/or fellows, to perform the following surgical operation/ procedure and administer such anesthesia as may be determined necessary by my physician:  Operation/Procedure name (s) Procedure(s):  EXPLORATORY LAPAROTOMY, POSSIBLE BOWEL RESECTION, POSSIBLE CREATION OF OSTOMY on Kirby Conteh   2.   I recognize that during the surgical operation/procedure, unforeseen conditions may necessitate additional or different procedures than those listed above.  I, therefore, further authorize and request that the above-named surgeon, assistants, or designees perform such procedures as are, in their judgment, necessary and desirable.    3.   My surgeon/physician has discussed prior to my surgery the potential benefits, risks and side effects of this procedure; the likelihood of achieving goals; and potential problems that might occur during recuperation.  They also discussed reasonable alternatives to the procedure, including risks, benefits, and side effects related to the alternatives and risks related to not receiving this procedure.  I have had all my questions answered and I acknowledge that no guarantee has been made as to the result that may be obtained.    4.   Should the need arise during my operation/procedure, which includes change of level of care prior to discharge, I also consent to the administration of blood and/or blood products.  Further, I understand that despite careful testing and screening of blood or blood products by collecting agencies, I may still be subject to ill effects as a result of receiving a  blood transfusion and/or blood products.  The following are some, but not all, of the potential risks that can occur: fever and allergic reactions, hemolytic reactions, transmission of diseases such as Hepatitis, AIDS and Cytomegalovirus (CMV) and fluid overload.  In the event that I wish to have an autologous transfusion of my own blood, or a directed donor transfusion, I will discuss this with my physician.  Check only if Refusing Blood or Blood Products  I understand refusal of blood or blood products as deemed necessary by my physician may have serious consequences to my condition to include possible death. I hereby assume responsibility for my refusal and release the hospital, its personnel, and my physicians from any responsibility for the consequences of my refusal.          o  Refuse      5.   I authorize the use of any specimen, organs, tissues, body parts or foreign objects that may be removed from my body during the operation/procedure for diagnosis, research or teaching purposes and their subsequent disposal by hospital authorities.  I also authorize the release of specimen test results and/or written reports to my treating physician on the hospital medical staff or other referring or consulting physicians involved in my care, at the discretion of the Pathologist or my treating physician.    6.   I consent to the photographing or videotaping of the operations or procedures to be performed, including appropriate portions of my body for medical, scientific, or educational purposes, provided my identity is not revealed by the pictures or by descriptive texts accompanying them.  If the procedure has been photographed/videotaped, the surgeon will obtain the original picture, image, videotape or CD.  The hospital will not be responsible for storage, release or maintenance of the picture, image, tape or CD.    7.   I consent to the presence of a  or observers in the operating room as deemed  necessary by my physician or their designees.    8.   I recognize that in the event my procedure results in extended X-Ray/fluoroscopy time, I may develop a skin reaction.    9. If I have a Do Not Attempt Resuscitation (DNAR) order in place, that status will be suspended while in the operating room, procedural suite, and during the recovery period unless otherwise explicitly stated by me (or a person authorized to consent on my behalf). The surgeon or my attending physician will determine when the applicable recovery period ends for purposes of reinstating the DNAR order.  10. Patients having a sterilization procedure: I understand that if the procedure is successful the results will be permanent and it will therefore be impossible for me to inseminate, conceive, or bear children.  I also understand that the procedure is intended to result in sterility, although the result has not been guaranteed.   11. I acknowledge that my physician has explained sedation/analgesia administration to me including the risk and benefits I consent to the administration of sedation/analgesia as may be necessary or desirable in the judgment of my physician.    I CERTIFY THAT I HAVE READ AND FULLY UNDERSTAND THE ABOVE CONSENT TO OPERATION and/or OTHER PROCEDURE.    _________________________________________  __________________________________  Signature of Patient     Signature of Responsible Person         ___________________________________         Printed Name of Responsible Person           _________________________________                 Relationship to Patient  _________________________________________  ______________________________  Signature of Witness          Date  Time      Patient Name: Kirby FLAKITA Conteh     : 1965                 Printed: 2024     Medical Record #: [id number]                     Page 1 of 2                                    [address]   [id number]    Consent for Anesthesia    IKirby agree to be cared for by an anesthesiologist, who is specially trained to monitor me and give me medicine to put me to sleep or keep me comfortable during my procedure    I understand that my anesthesiologist is not an employee or agent of [facility] or [employer]. He or she works for [employer].    As the patient asking for anesthesia services, I agree to:  Allow the anesthesiologist (anesthesia doctor) to give me medicine and do additional procedures as necessary. Some examples are: Starting or using an “IV” to give me medicine, fluids or blood during my procedure, and having a breathing tube placed to help me breathe when I’m asleep (intubation). In the event that my heart stops working properly, I understand that my anesthesiologist will make every effort to sustain my life, unless otherwise directed by [facility] Do Not Resuscitate documents.  Tell my anesthesia doctor before my procedure:  If I am pregnant.  The last time that I ate or drank.  All of the medicines I take (including prescriptions, herbal supplements, and pills I can buy without a prescription (including street drugs/illegal medications). Failure to inform my anesthesiologist about these medicines may increase my risk of anesthetic complications.  If I am allergic to anything or have had a reaction to anesthesia before.  I understand how the anesthesia medicine will help me (benefits).  I understand that with any type of anesthesia medicine there are risks:  The most common risks are: nausea, vomiting, sore throat, muscle soreness, damage to my eyes, mouth, or teeth (from breathing tube placement).  Rare risks include: remembering what happened during my procedure, allergic reactions to medications, injury to my airway, heart, lungs, vision, nerves, or muscles and in extremely rare instances death.  My doctor has explained to me other choices available  to me for my care (alternatives).  Pregnant Patients (“epidural”):  I understand that the risks of having an epidural (medicine given into my back to help control pain during labor), include itching, low blood pressure, difficulty urinating, headache or slowing of the baby’s heart. Very rare risks include infection, bleeding, seizure, irregular heart rhythms and nerve injury.  Regional Anesthesia (“spinal”, “epidural”, & “nerve blocks”):  I understand that rare but potential complications include headache, bleeding, infection, seizure, irregular heart rhythms, and nerve injury.    I can change my mind about having anesthesia services at any time before I get the medicine.    _____________________________________________________________________________  Patient (or Representative) Signature/Relationship to Patient  Date   Time    _____________________________________________________________________________   Name (if used)    Language/Organization   Time    _____________________________________________________________________________  Anesthesiologist Signature     Date   Time  I have discussed the procedure and information above with the patient (or patient’s representative) and answered their questions. The patient or their representative has agreed to have anesthesia services.    _____________________________________________________________________________  Witness        Date   Time  I have verified that the signature is that of the patient or patient’s representative, and that it was signed before the procedure  Patient Name: Kirby Conteh     : 1965                 Printed: 2024     Medical Record #: [id number]                     Page 2 of 2

## (undated) NOTE — LETTER
[address]  [address]  Authorization for Surgical Operation and Procedure     Date:___________                                                                                                         Time:__________  I hereby authorize Surgeon(s):  TORSTEN MORGAN MD, my physician and his/her assistants (if applicable), which may include medical students, residents, and/or fellows, to perform the following surgical operation/ procedure and administer such anesthesia as may be determined necessary by my physician:  Operation/Procedure name (s) Procedure(s):  LEFT FOREARM WOUND EXPLORATION, POSSIBLE REPAIR TO TENDON on Kirby Conteh   2.   I recognize that during the surgical operation/procedure, unforeseen conditions may necessitate additional or different procedures than those listed above.  I, therefore, further authorize and request that the above-named surgeon, assistants, or designees perform such procedures as are, in their judgment, necessary and desirable.    3.   My surgeon/physician has discussed prior to my surgery the potential benefits, risks and side effects of this procedure; the likelihood of achieving goals; and potential problems that might occur during recuperation.  They also discussed reasonable alternatives to the procedure, including risks, benefits, and side effects related to the alternatives and risks related to not receiving this procedure.  I have had all my questions answered and I acknowledge that no guarantee has been made as to the result that may be obtained.    4.   Should the need arise during my operation/procedure, which includes change of level of care prior to discharge, I also consent to the administration of blood and/or blood products.  Further, I understand that despite careful testing and screening of blood or blood products by collecting agencies, I may still be subject to ill effects as a result of receiving a blood transfusion  and/or blood products.  The following are some, but not all, of the potential risks that can occur: fever and allergic reactions, hemolytic reactions, transmission of diseases such as Hepatitis, AIDS and Cytomegalovirus (CMV) and fluid overload.  In the event that I wish to have an autologous transfusion of my own blood, or a directed donor transfusion, I will discuss this with my physician.  Check only if Refusing Blood or Blood Products  I understand refusal of blood or blood products as deemed necessary by my physician may have serious consequences to my condition to include possible death. I hereby assume responsibility for my refusal and release the hospital, its personnel, and my physicians from any responsibility for the consequences of my refusal.          o  Refuse      5.   I authorize the use of any specimen, organs, tissues, body parts or foreign objects that may be removed from my body during the operation/procedure for diagnosis, research or teaching purposes and their subsequent disposal by hospital authorities.  I also authorize the release of specimen test results and/or written reports to my treating physician on the hospital medical staff or other referring or consulting physicians involved in my care, at the discretion of the Pathologist or my treating physician.    6.   I consent to the photographing or videotaping of the operations or procedures to be performed, including appropriate portions of my body for medical, scientific, or educational purposes, provided my identity is not revealed by the pictures or by descriptive texts accompanying them.  If the procedure has been photographed/videotaped, the surgeon will obtain the original picture, image, videotape or CD.  The hospital will not be responsible for storage, release or maintenance of the picture, image, tape or CD.    7.   I consent to the presence of a  or observers in the operating room as deemed necessary by my  physician or their designees.    8.   I recognize that in the event my procedure results in extended X-Ray/fluoroscopy time, I may develop a skin reaction.    9. If I have a Do Not Attempt Resuscitation (DNAR) order in place, that status will be suspended while in the operating room, procedural suite, and during the recovery period unless otherwise explicitly stated by me (or a person authorized to consent on my behalf). The surgeon or my attending physician will determine when the applicable recovery period ends for purposes of reinstating the DNAR order.  10. Patients having a sterilization procedure: I understand that if the procedure is successful the results will be permanent and it will therefore be impossible for me to inseminate, conceive, or bear children.  I also understand that the procedure is intended to result in sterility, although the result has not been guaranteed.   11. I acknowledge that my physician has explained sedation/analgesia administration to me including the risk and benefits I consent to the administration of sedation/analgesia as may be necessary or desirable in the judgment of my physician.    I CERTIFY THAT I HAVE READ AND FULLY UNDERSTAND THE ABOVE CONSENT TO OPERATION and/or OTHER PROCEDURE.    _________________________________________  __________________________________  Signature of Patient     Signature of Responsible Person         ___________________________________         Printed Name of Responsible Person           _________________________________                 Relationship to Patient  _________________________________________  ______________________________  Signature of Witness          Date  Time      Patient Name: Kirby BOGGS Wero     : 1965                 Printed: 2024     Medical Record #: [id number]                     Page 1 of 2                                    [address]  [address]    Consent for  Anesthesia    I, Kirby Conteh agree to be cared for by an anesthesiologist, who is specially trained to monitor me and give me medicine to put me to sleep or keep me comfortable during my procedure    I understand that my anesthesiologist is not an employee or agent of [facility] or [employer]. He or she works for [employer].    As the patient asking for anesthesia services, I agree to:  Allow the anesthesiologist (anesthesia doctor) to give me medicine and do additional procedures as necessary. Some examples are: Starting or using an “IV” to give me medicine, fluids or blood during my procedure, and having a breathing tube placed to help me breathe when I’m asleep (intubation). In the event that my heart stops working properly, I understand that my anesthesiologist will make every effort to sustain my life, unless otherwise directed by [facility] Do Not Resuscitate documents.  Tell my anesthesia doctor before my procedure:  If I am pregnant.  The last time that I ate or drank.  All of the medicines I take (including prescriptions, herbal supplements, and pills I can buy without a prescription (including street drugs/illegal medications). Failure to inform my anesthesiologist about these medicines may increase my risk of anesthetic complications.  If I am allergic to anything or have had a reaction to anesthesia before.  I understand how the anesthesia medicine will help me (benefits).  I understand that with any type of anesthesia medicine there are risks:  The most common risks are: nausea, vomiting, sore throat, muscle soreness, damage to my eyes, mouth, or teeth (from breathing tube placement).  Rare risks include: remembering what happened during my procedure, allergic reactions to medications, injury to my airway, heart, lungs, vision, nerves, or muscles and in extremely rare instances death.  My doctor has explained to me other choices available to me for my care  (alternatives).  Pregnant Patients (“epidural”):  I understand that the risks of having an epidural (medicine given into my back to help control pain during labor), include itching, low blood pressure, difficulty urinating, headache or slowing of the baby’s heart. Very rare risks include infection, bleeding, seizure, irregular heart rhythms and nerve injury.  Regional Anesthesia (“spinal”, “epidural”, & “nerve blocks”):  I understand that rare but potential complications include headache, bleeding, infection, seizure, irregular heart rhythms, and nerve injury.    I can change my mind about having anesthesia services at any time before I get the medicine.    _____________________________________________________________________________  Patient (or Representative) Signature/Relationship to Patient  Date   Time    _____________________________________________________________________________   Name (if used)    Language/Organization   Time    _____________________________________________________________________________  Anesthesiologist Signature     Date   Time  I have discussed the procedure and information above with the patient (or patient’s representative) and answered their questions. The patient or their representative has agreed to have anesthesia services.    _____________________________________________________________________________  Witness        Date   Time  I have verified that the signature is that of the patient or patient’s representative, and that it was signed before the procedure  Patient Name: Kirby Rioselda     : 1965                 Printed: 2024     Medical Record #: [id number]                     Page 2 of 2

## (undated) NOTE — LETTER
Date: 10/4/2024    Patient Name: Noble Arizmendi          To Whom it may concern:    The above patient was seen at [facility] for treatment of a medical condition.    Patient to remain off work until Oct 31st, 2024.         Sincerely,    Martell Mehta MD